# Patient Record
Sex: FEMALE | Race: WHITE | Employment: OTHER | URBAN - METROPOLITAN AREA
[De-identification: names, ages, dates, MRNs, and addresses within clinical notes are randomized per-mention and may not be internally consistent; named-entity substitution may affect disease eponyms.]

---

## 2017-05-18 ENCOUNTER — OFFICE VISIT (OUTPATIENT)
Dept: ORTHOPEDIC SURGERY | Age: 42
End: 2017-05-18

## 2017-05-18 VITALS
HEIGHT: 60 IN | DIASTOLIC BLOOD PRESSURE: 84 MMHG | BODY MASS INDEX: 29.26 KG/M2 | WEIGHT: 149.03 LBS | SYSTOLIC BLOOD PRESSURE: 134 MMHG | HEART RATE: 84 BPM

## 2017-05-18 DIAGNOSIS — M79.671 FOOT PAIN, RIGHT: Primary | ICD-10-CM

## 2017-05-18 DIAGNOSIS — S92.341A CLOSED DISPLACED FRACTURE OF FOURTH METATARSAL BONE OF RIGHT FOOT, INITIAL ENCOUNTER: ICD-10-CM

## 2017-05-18 PROCEDURE — G8419 CALC BMI OUT NRM PARAM NOF/U: HCPCS | Performed by: ORTHOPAEDIC SURGERY

## 2017-05-18 PROCEDURE — 99214 OFFICE O/P EST MOD 30 MIN: CPT | Performed by: ORTHOPAEDIC SURGERY

## 2017-05-18 PROCEDURE — G8427 DOCREV CUR MEDS BY ELIG CLIN: HCPCS | Performed by: ORTHOPAEDIC SURGERY

## 2017-05-18 PROCEDURE — 73620 X-RAY EXAM OF FOOT: CPT | Performed by: ORTHOPAEDIC SURGERY

## 2017-05-18 PROCEDURE — 73610 X-RAY EXAM OF ANKLE: CPT | Performed by: ORTHOPAEDIC SURGERY

## 2017-05-18 PROCEDURE — 1036F TOBACCO NON-USER: CPT | Performed by: ORTHOPAEDIC SURGERY

## 2017-05-18 PROCEDURE — L3260 AMBULATORY SURGICAL BOOT EAC: HCPCS | Performed by: ORTHOPAEDIC SURGERY

## 2017-05-18 PROCEDURE — 28470 CLTX METATARSAL FX WO MNP EA: CPT | Performed by: ORTHOPAEDIC SURGERY

## 2017-06-07 ENCOUNTER — OFFICE VISIT (OUTPATIENT)
Dept: ORTHOPEDIC SURGERY | Age: 42
End: 2017-06-07

## 2017-06-07 VITALS
SYSTOLIC BLOOD PRESSURE: 105 MMHG | HEART RATE: 87 BPM | DIASTOLIC BLOOD PRESSURE: 74 MMHG | HEIGHT: 60 IN | WEIGHT: 149.03 LBS | BODY MASS INDEX: 29.26 KG/M2

## 2017-06-07 DIAGNOSIS — M25.571 RIGHT ANKLE PAIN, UNSPECIFIED CHRONICITY: Primary | ICD-10-CM

## 2017-06-07 DIAGNOSIS — S92.341D CLOSED DISPLACED FRACTURE OF FOURTH METATARSAL BONE OF RIGHT FOOT WITH ROUTINE HEALING, SUBSEQUENT ENCOUNTER: ICD-10-CM

## 2017-06-07 PROCEDURE — 73630 X-RAY EXAM OF FOOT: CPT | Performed by: ORTHOPAEDIC SURGERY

## 2017-06-07 PROCEDURE — 29405 APPL SHORT LEG CAST: CPT | Performed by: ORTHOPAEDIC SURGERY

## 2017-06-07 PROCEDURE — 99024 POSTOP FOLLOW-UP VISIT: CPT | Performed by: ORTHOPAEDIC SURGERY

## 2017-07-05 ENCOUNTER — OFFICE VISIT (OUTPATIENT)
Dept: ORTHOPEDIC SURGERY | Age: 42
End: 2017-07-05

## 2017-07-05 VITALS
WEIGHT: 149.03 LBS | HEART RATE: 99 BPM | BODY MASS INDEX: 29.26 KG/M2 | SYSTOLIC BLOOD PRESSURE: 119 MMHG | DIASTOLIC BLOOD PRESSURE: 78 MMHG | HEIGHT: 60 IN

## 2017-07-05 DIAGNOSIS — S92.341D CLOSED DISPLACED FRACTURE OF FOURTH METATARSAL BONE OF RIGHT FOOT WITH ROUTINE HEALING, SUBSEQUENT ENCOUNTER: ICD-10-CM

## 2017-07-05 DIAGNOSIS — M79.671 FOOT PAIN, RIGHT: Primary | ICD-10-CM

## 2017-07-05 PROCEDURE — L4361 PNEUMA/VAC WALK BOOT PRE OTS: HCPCS | Performed by: ORTHOPAEDIC SURGERY

## 2017-07-05 PROCEDURE — 99024 POSTOP FOLLOW-UP VISIT: CPT | Performed by: ORTHOPAEDIC SURGERY

## 2017-07-05 PROCEDURE — 73630 X-RAY EXAM OF FOOT: CPT | Performed by: ORTHOPAEDIC SURGERY

## 2017-07-14 ENCOUNTER — OFFICE VISIT (OUTPATIENT)
Dept: ORTHOPEDIC SURGERY | Age: 42
End: 2017-07-14

## 2017-07-14 VITALS
WEIGHT: 149.03 LBS | BODY MASS INDEX: 29.26 KG/M2 | HEART RATE: 75 BPM | HEIGHT: 60 IN | DIASTOLIC BLOOD PRESSURE: 72 MMHG | SYSTOLIC BLOOD PRESSURE: 127 MMHG

## 2017-07-14 DIAGNOSIS — S92.341D CLOSED DISPLACED FRACTURE OF FOURTH METATARSAL BONE OF RIGHT FOOT WITH ROUTINE HEALING, SUBSEQUENT ENCOUNTER: Primary | ICD-10-CM

## 2017-07-14 PROCEDURE — 99024 POSTOP FOLLOW-UP VISIT: CPT | Performed by: ORTHOPAEDIC SURGERY

## 2017-07-14 PROCEDURE — 73630 X-RAY EXAM OF FOOT: CPT | Performed by: ORTHOPAEDIC SURGERY

## 2017-08-02 ENCOUNTER — OFFICE VISIT (OUTPATIENT)
Dept: ORTHOPEDIC SURGERY | Age: 42
End: 2017-08-02

## 2017-08-02 VITALS
HEIGHT: 60 IN | SYSTOLIC BLOOD PRESSURE: 115 MMHG | BODY MASS INDEX: 29.26 KG/M2 | HEART RATE: 104 BPM | WEIGHT: 149.03 LBS | DIASTOLIC BLOOD PRESSURE: 68 MMHG

## 2017-08-02 DIAGNOSIS — S92.341D CLOSED DISPLACED FRACTURE OF FOURTH METATARSAL BONE OF RIGHT FOOT WITH ROUTINE HEALING, SUBSEQUENT ENCOUNTER: ICD-10-CM

## 2017-08-02 DIAGNOSIS — M79.671 FOOT PAIN, RIGHT: Primary | ICD-10-CM

## 2017-08-02 PROCEDURE — 73630 X-RAY EXAM OF FOOT: CPT | Performed by: ORTHOPAEDIC SURGERY

## 2017-08-02 PROCEDURE — 99024 POSTOP FOLLOW-UP VISIT: CPT | Performed by: ORTHOPAEDIC SURGERY

## 2017-09-13 ENCOUNTER — OFFICE VISIT (OUTPATIENT)
Dept: ORTHOPEDIC SURGERY | Age: 42
End: 2017-09-13

## 2017-09-13 VITALS
BODY MASS INDEX: 29.26 KG/M2 | HEART RATE: 98 BPM | DIASTOLIC BLOOD PRESSURE: 69 MMHG | SYSTOLIC BLOOD PRESSURE: 108 MMHG | WEIGHT: 149.03 LBS | HEIGHT: 60 IN

## 2017-09-13 DIAGNOSIS — S92.341D CLOSED DISPLACED FRACTURE OF FOURTH METATARSAL BONE OF RIGHT FOOT WITH ROUTINE HEALING, SUBSEQUENT ENCOUNTER: ICD-10-CM

## 2017-09-13 DIAGNOSIS — M79.671 FOOT PAIN, RIGHT: Primary | ICD-10-CM

## 2017-09-13 PROCEDURE — G8427 DOCREV CUR MEDS BY ELIG CLIN: HCPCS | Performed by: ORTHOPAEDIC SURGERY

## 2017-09-13 PROCEDURE — 99212 OFFICE O/P EST SF 10 MIN: CPT | Performed by: ORTHOPAEDIC SURGERY

## 2017-09-13 PROCEDURE — 73630 X-RAY EXAM OF FOOT: CPT | Performed by: ORTHOPAEDIC SURGERY

## 2017-09-13 PROCEDURE — 1036F TOBACCO NON-USER: CPT | Performed by: ORTHOPAEDIC SURGERY

## 2017-09-13 PROCEDURE — G8417 CALC BMI ABV UP PARAM F/U: HCPCS | Performed by: ORTHOPAEDIC SURGERY

## 2018-03-27 ENCOUNTER — HOSPITAL ENCOUNTER (OUTPATIENT)
Dept: MAMMOGRAPHY | Age: 43
Discharge: HOME | End: 2018-03-27
Attending: NURSE PRACTITIONER | Admitting: NURSE PRACTITIONER

## 2018-03-27 DIAGNOSIS — Z12.31 ENCOUNTER FOR SCREENING MAMMOGRAM FOR BREAST CANCER: ICD-10-CM

## 2019-03-28 ENCOUNTER — HOSPITAL ENCOUNTER (OUTPATIENT)
Dept: MAMMOGRAPHY | Age: 44
Discharge: HOME | End: 2019-03-28
Payer: MEDICARE

## 2019-03-28 DIAGNOSIS — Z12.31 SCREENING MAMMOGRAM, ENCOUNTER FOR: ICD-10-CM

## 2019-03-28 PROCEDURE — 77067 SCR MAMMO BI INCL CAD: CPT

## 2020-03-10 ENCOUNTER — HOSPITAL ENCOUNTER (EMERGENCY)
Age: 45
Discharge: HOME | End: 2020-03-10
Attending: EMERGENCY MEDICINE
Payer: MEDICARE

## 2020-03-10 ENCOUNTER — APPOINTMENT (OUTPATIENT)
Dept: GENERAL RADIOLOGY | Age: 45
End: 2020-03-10
Payer: MEDICARE

## 2020-03-10 VITALS
SYSTOLIC BLOOD PRESSURE: 110 MMHG | RESPIRATION RATE: 16 BRPM | OXYGEN SATURATION: 100 % | HEART RATE: 85 BPM | TEMPERATURE: 98.5 F | DIASTOLIC BLOOD PRESSURE: 85 MMHG | BODY MASS INDEX: 28.66 KG/M2 | WEIGHT: 146 LBS

## 2020-03-10 LAB
RAPID INFLUENZA  B AGN: NEGATIVE
RAPID INFLUENZA A AGN: POSITIVE
S PYO AG THROAT QL: NEGATIVE

## 2020-03-10 PROCEDURE — 87880 STREP A ASSAY W/OPTIC: CPT

## 2020-03-10 PROCEDURE — 87804 INFLUENZA ASSAY W/OPTIC: CPT

## 2020-03-10 PROCEDURE — 71046 X-RAY EXAM CHEST 2 VIEWS: CPT

## 2020-03-10 PROCEDURE — 99283 EMERGENCY DEPT VISIT LOW MDM: CPT

## 2020-03-10 PROCEDURE — 87081 CULTURE SCREEN ONLY: CPT

## 2020-03-10 RX ORDER — ONDANSETRON 4 MG/1
4 TABLET, FILM COATED ORAL EVERY 8 HOURS PRN
Qty: 10 TABLET | Refills: 0 | Status: SHIPPED | OUTPATIENT
Start: 2020-03-10 | End: 2021-01-03 | Stop reason: ALTCHOICE

## 2020-03-10 RX ORDER — AZITHROMYCIN 1 G
1 PACKET (EA) ORAL ONCE
COMMUNITY
End: 2021-01-03 | Stop reason: ALTCHOICE

## 2020-03-10 RX ORDER — AMOXICILLIN AND CLAVULANATE POTASSIUM 500; 125 MG/1; MG/1
1 TABLET, FILM COATED ORAL 2 TIMES DAILY
Qty: 20 TABLET | Refills: 0 | Status: SHIPPED | OUTPATIENT
Start: 2020-03-10 | End: 2020-03-20

## 2020-03-10 RX ORDER — AMOXICILLIN AND CLAVULANATE POTASSIUM 875; 125 MG/1; MG/1
1 TABLET, FILM COATED ORAL 2 TIMES DAILY
Qty: 20 TABLET | Refills: 0 | Status: SHIPPED | OUTPATIENT
Start: 2020-03-10 | End: 2020-03-10

## 2020-03-10 ASSESSMENT — ENCOUNTER SYMPTOMS
RHINORRHEA: 1
COUGH: 1
SORE THROAT: 1
SINUS CONGESTION: 1
VOMITING: 1
ABDOMINAL PAIN: 0

## 2020-03-10 ASSESSMENT — PAIN DESCRIPTION - DESCRIPTORS: DESCRIPTORS: ACHING

## 2020-03-10 ASSESSMENT — PAIN DESCRIPTION - LOCATION: LOCATION: THROAT

## 2020-03-10 ASSESSMENT — PAIN SCALES - WONG BAKER: WONGBAKER_NUMERICALRESPONSE: 6

## 2020-03-10 ASSESSMENT — PAIN DESCRIPTION - PAIN TYPE: TYPE: ACUTE PAIN

## 2020-03-10 NOTE — ED PROVIDER NOTES
[address] [facility]        Pt Name: Krysten Negron  MRN: [id number]  [place] 1975  Date of evaluation: 3/10/2020  Provider: Edi Hines PA-C  PCP: Felipe De Souza MD    Shared Visit or Autonomous Visit:  I have seen and evaluated this patient with my supervising physician Cynthia Humphries MD.    [address] [facility]       Chief Complaint   Patient presents with    Cough     pt states she has had cough x2 weeks, states she has had Z-pack x2 with worsening cough, family states they are concerned she may have pneumonnia, states today she began having sore throat and nausea       HISTORY OF PRESENT ILLNESS   (Location/Symptom, Timing/Onset, Context/Setting, Quality, Duration, Modifying Factors, Severity)  Note limiting factors. Krysten Negron is a 45 y.o. female brought in by parents for evaluation of cough and chest congestion for the past 1 month. States several family members have been sick with cough and congestion. Patient's father is a  he started her on a Z-Vinnie approximately 1 month ago after symptoms started and she initially improved but symptoms returned 2 weeks ago and was not getting any better so he started her on another Z-Vinnie which she finished today but when she was at workshop vomited x2. She has Down syndrome but otherwise states she is very healthy not on any chronic medications. Family doctor is Dr Twyla Padilla. She denies any abdominal pain. No diarrhea. They have not noticed any fevers. She complained of sore throat today. The history is provided by the patient and a parent. Cough   Progression:  Unchanged  Chronicity:  New  Associated symptoms: rhinorrhea, sinus congestion and sore throat    Associated symptoms: no chest pain and no fever        Nursing Notes were reviewed    REVIEW OF SYSTEMS    (2-9 systems for level 4, 10 or more for level 5)     Review of Systems   Constitutional: Negative for fever.    HENT: Positive for congestion, postnasal drip, rhinorrhea and sore throat. Respiratory: Positive for cough. Cardiovascular: Negative for chest pain. Gastrointestinal: Positive for vomiting. Negative for abdominal pain. Positives and Pertinent negatives as per HPI. PAST MEDICAL HISTORY     Past Medical History:   Diagnosis Date    Down syndrome          SURGICAL HISTORY     Past Surgical History:   Procedure Laterality Date    FOOT SURGERY  5/26/11    lapidus procedure and soft tissue release right foot. JoseflashRosmery Austin       Discharge Medication List as of 3/10/2020  4:35 PM      CONTINUE these medications which have NOT CHANGED    Details   azithromycin (ZITHROMAX) 1 g powder Take 1 packet by mouth onceHistorical Med      !! diclofenac sodium (VOLTAREN) 1 % GEL Apply 2 g topically 2 times daily, Topical, 2 TIMES DAILY Starting 9/13/2017, Until Discontinued, Disp-1 Tube, R-3, Normal      !! diclofenac sodium (VOLTAREN) 1 % GEL Apply 4 g topically 4 times daily, Topical, 4 TIMES DAILY Starting 5/13/2016, Until Discontinued, Disp-5 Tube, R-0, Normal      MedroxyPROGESTERone Acetate (DEPO-PROVERA IM) Inject  into the muscle See Admin Instructions. Every three months       Calcium Citrate-Vitamin D 200-200 MG-UNIT TABS Take  by mouth daily. !! - Potential duplicate medications found. Please discuss with provider. ALLERGIES     Patient has no known allergies.     FAMILYHISTORY       Family History   Problem Relation Age of Onset    Arthritis Mother     Cancer Mother     Diabetes Mother     High Blood Pressure Mother     High Cholesterol Mother     Arthritis Father     Hearing Loss Father     Heart Disease Father     High Blood Pressure Father     High Cholesterol Father     Stroke Father     Arthritis Sister     Cancer Sister     Asthma Maternal Grandmother     Early Death Maternal Grandfather           SOCIAL HISTORY       Social History     Socioeconomic exudate or uvula swelling. Eyes:      Conjunctiva/sclera: Conjunctivae normal.   Cardiovascular:      Rate and Rhythm: Normal rate and regular rhythm. Heart sounds: Normal heart sounds. Pulmonary:      Effort: Pulmonary effort is normal. No respiratory distress. Breath sounds: Normal breath sounds. No stridor. No wheezing, rhonchi or rales. Abdominal:      General: Bowel sounds are normal. There is no distension. Tenderness: There is no abdominal tenderness. There is no guarding or rebound. Skin:     General: Skin is warm and dry. Neurological:      Mental Status: She is alert and oriented to person, place, and time. Motor: No abnormal muscle tone. Psychiatric:         Behavior: Behavior normal.         DIAGNOSTIC RESULTS   LABS:    Labs Reviewed   RAPID INFLUENZA A/B ANTIGENS - Abnormal; Notable for the following components:       Result Value    Rapid Influenza A Ag POSITIVE (*)     All other components within normal limits    Narrative:     Performed at:  [facility]. [facility]  [address] [place] [address]. [facility], [address]   Phone  A THROAT    Narrative:     Performed at:  [facility]  [address] [place] [address]. [facility], [address]   Phone [phone]   CULTURE, BETA STREP CONFIRM PLATES     Results for orders placed or performed during the hospital encounter of 03/10/20   Strep screen group a throat   Result Value Ref Range    Rapid Strep A Screen Negative Negative   Rapid influenza A/B antigens   Result Value Ref Range    Rapid Influenza A Ag POSITIVE (A) Negative    Rapid Influenza B Ag Negative Negative       All other labs were within normal range or not returned as of this dictation. EKG: All EKG's are interpreted by the Emergency Department Physician in the absence of a cardiologist.  Please see their note for interpretation of EKG.       RADIOLOGY:   Non-plain film

## 2020-03-13 LAB — S PYO THROAT QL CULT: NORMAL

## 2020-12-28 LAB — SARS-COV-2: ABNORMAL

## 2021-01-03 ENCOUNTER — HOSPITAL ENCOUNTER (INPATIENT)
Age: 46
LOS: 5 days | Discharge: HOME HEALTH | DRG: 177 | End: 2021-01-08
Attending: EMERGENCY MEDICINE | Admitting: INTERNAL MEDICINE
Payer: MEDICARE

## 2021-01-03 ENCOUNTER — APPOINTMENT (OUTPATIENT)
Dept: GENERAL RADIOLOGY | Age: 46
DRG: 177 | End: 2021-01-03
Payer: MEDICARE

## 2021-01-03 ENCOUNTER — APPOINTMENT (OUTPATIENT)
Dept: CT IMAGING | Age: 46
DRG: 177 | End: 2021-01-03
Payer: MEDICARE

## 2021-01-03 DIAGNOSIS — R09.02 HYPOXIA: ICD-10-CM

## 2021-01-03 DIAGNOSIS — J12.82 PNEUMONIA DUE TO COVID-19 VIRUS: Primary | ICD-10-CM

## 2021-01-03 DIAGNOSIS — R53.1 GENERALIZED WEAKNESS: ICD-10-CM

## 2021-01-03 DIAGNOSIS — U07.1 PNEUMONIA DUE TO COVID-19 VIRUS: Primary | ICD-10-CM

## 2021-01-03 DIAGNOSIS — R63.8 DECREASED ORAL INTAKE: ICD-10-CM

## 2021-01-03 PROBLEM — E87.1 HYPONATREMIA: Status: ACTIVE | Noted: 2021-01-03

## 2021-01-03 PROBLEM — Q90.9 DOWN'S SYNDROME: Status: ACTIVE | Noted: 2021-01-03

## 2021-01-03 PROBLEM — R91.8 PULMONARY INFILTRATES: Status: ACTIVE | Noted: 2021-01-03

## 2021-01-03 LAB
A/G RATIO: 1 (ref 1.1–2.2)
ALBUMIN SERPL-MCNC: 3.7 G/DL (ref 3.4–5)
ALP BLD-CCNC: 85 U/L (ref 40–129)
ALT SERPL-CCNC: 33 U/L (ref 10–40)
ANION GAP SERPL CALCULATED.3IONS-SCNC: 9 MMOL/L (ref 3–16)
APTT: 22.5 SEC (ref 24.2–36.2)
AST SERPL-CCNC: 39 U/L (ref 15–37)
BASOPHILS ABSOLUTE: 0 K/UL (ref 0–0.2)
BASOPHILS RELATIVE PERCENT: 0.3 %
BILIRUB SERPL-MCNC: 0.4 MG/DL (ref 0–1)
BUN BLDV-MCNC: 15 MG/DL (ref 7–20)
CALCIUM SERPL-MCNC: 9 MG/DL (ref 8.3–10.6)
CHLORIDE BLD-SCNC: 99 MMOL/L (ref 99–110)
CO2: 24 MMOL/L (ref 21–32)
CREAT SERPL-MCNC: 0.7 MG/DL (ref 0.6–1.1)
D DIMER: >5250 NG/ML DDU (ref 0–229)
EOSINOPHILS ABSOLUTE: 0 K/UL (ref 0–0.6)
EOSINOPHILS RELATIVE PERCENT: 0.1 %
GFR AFRICAN AMERICAN: >60
GFR NON-AFRICAN AMERICAN: >60
GLOBULIN: 3.8 G/DL
GLUCOSE BLD-MCNC: 106 MG/DL (ref 70–99)
HCG QUALITATIVE: NEGATIVE
HCT VFR BLD CALC: 37.2 % (ref 36–48)
HCT VFR BLD CALC: 41.2 % (ref 36–48)
HEMOGLOBIN: 12.3 G/DL (ref 12–16)
HEMOGLOBIN: 13.6 G/DL (ref 12–16)
LACTIC ACID: 1.1 MMOL/L (ref 0.4–2)
LYMPHOCYTES ABSOLUTE: 0.8 K/UL (ref 1–5.1)
LYMPHOCYTES RELATIVE PERCENT: 14.5 %
MCH RBC QN AUTO: 31.5 PG (ref 26–34)
MCH RBC QN AUTO: 31.6 PG (ref 26–34)
MCHC RBC AUTO-ENTMCNC: 33 G/DL (ref 31–36)
MCHC RBC AUTO-ENTMCNC: 33.1 G/DL (ref 31–36)
MCV RBC AUTO: 95.4 FL (ref 80–100)
MCV RBC AUTO: 95.5 FL (ref 80–100)
MONOCYTES ABSOLUTE: 0.7 K/UL (ref 0–1.3)
MONOCYTES RELATIVE PERCENT: 13.9 %
NEUTROPHILS ABSOLUTE: 3.7 K/UL (ref 1.7–7.7)
NEUTROPHILS RELATIVE PERCENT: 71.2 %
PDW BLD-RTO: 13.5 % (ref 12.4–15.4)
PDW BLD-RTO: 13.6 % (ref 12.4–15.4)
PLATELET # BLD: 140 K/UL (ref 135–450)
PLATELET # BLD: 141 K/UL (ref 135–450)
PMV BLD AUTO: 8.5 FL (ref 5–10.5)
PMV BLD AUTO: 8.7 FL (ref 5–10.5)
POTASSIUM REFLEX MAGNESIUM: 5 MMOL/L (ref 3.5–5.1)
PRO-BNP: ABNORMAL PG/ML (ref 0–124)
PROCALCITONIN: 0.07 NG/ML (ref 0–0.15)
PROCALCITONIN: 0.07 NG/ML (ref 0–0.15)
RBC # BLD: 3.89 M/UL (ref 4–5.2)
RBC # BLD: 4.32 M/UL (ref 4–5.2)
SODIUM BLD-SCNC: 132 MMOL/L (ref 136–145)
TOTAL PROTEIN: 7.5 G/DL (ref 6.4–8.2)
TROPONIN: <0.01 NG/ML
WBC # BLD: 3.3 K/UL (ref 4–11)
WBC # BLD: 5.2 K/UL (ref 4–11)

## 2021-01-03 PROCEDURE — XW033E5 INTRODUCTION OF REMDESIVIR ANTI-INFECTIVE INTO PERIPHERAL VEIN, PERCUTANEOUS APPROACH, NEW TECHNOLOGY GROUP 5: ICD-10-PCS | Performed by: INTERNAL MEDICINE

## 2021-01-03 PROCEDURE — 84703 CHORIONIC GONADOTROPIN ASSAY: CPT

## 2021-01-03 PROCEDURE — 93005 ELECTROCARDIOGRAM TRACING: CPT | Performed by: PHYSICIAN ASSISTANT

## 2021-01-03 PROCEDURE — 84145 PROCALCITONIN (PCT): CPT

## 2021-01-03 PROCEDURE — 85730 THROMBOPLASTIN TIME PARTIAL: CPT

## 2021-01-03 PROCEDURE — 2580000003 HC RX 258: Performed by: INTERNAL MEDICINE

## 2021-01-03 PROCEDURE — 1200000000 HC SEMI PRIVATE

## 2021-01-03 PROCEDURE — 99222 1ST HOSP IP/OBS MODERATE 55: CPT | Performed by: INTERNAL MEDICINE

## 2021-01-03 PROCEDURE — 82728 ASSAY OF FERRITIN: CPT

## 2021-01-03 PROCEDURE — 36415 COLL VENOUS BLD VENIPUNCTURE: CPT

## 2021-01-03 PROCEDURE — 99284 EMERGENCY DEPT VISIT MOD MDM: CPT

## 2021-01-03 PROCEDURE — 71045 X-RAY EXAM CHEST 1 VIEW: CPT

## 2021-01-03 PROCEDURE — 84443 ASSAY THYROID STIM HORMONE: CPT

## 2021-01-03 PROCEDURE — 6360000004 HC RX CONTRAST MEDICATION

## 2021-01-03 PROCEDURE — 94761 N-INVAS EAR/PLS OXIMETRY MLT: CPT

## 2021-01-03 PROCEDURE — 6360000002 HC RX W HCPCS: Performed by: INTERNAL MEDICINE

## 2021-01-03 PROCEDURE — 82306 VITAMIN D 25 HYDROXY: CPT

## 2021-01-03 PROCEDURE — 71260 CT THORAX DX C+: CPT

## 2021-01-03 PROCEDURE — 2500000003 HC RX 250 WO HCPCS: Performed by: INTERNAL MEDICINE

## 2021-01-03 PROCEDURE — 6370000000 HC RX 637 (ALT 250 FOR IP): Performed by: PHYSICIAN ASSISTANT

## 2021-01-03 PROCEDURE — 6360000002 HC RX W HCPCS: Performed by: NURSE PRACTITIONER

## 2021-01-03 PROCEDURE — 84484 ASSAY OF TROPONIN QUANT: CPT

## 2021-01-03 PROCEDURE — 2500000003 HC RX 250 WO HCPCS: Performed by: NURSE PRACTITIONER

## 2021-01-03 PROCEDURE — 83880 ASSAY OF NATRIURETIC PEPTIDE: CPT

## 2021-01-03 PROCEDURE — 2700000000 HC OXYGEN THERAPY PER DAY

## 2021-01-03 PROCEDURE — 2580000003 HC RX 258: Performed by: PHYSICIAN ASSISTANT

## 2021-01-03 PROCEDURE — XW13325 TRANSFUSION OF CONVALESCENT PLASMA (NONAUTOLOGOUS) INTO PERIPHERAL VEIN, PERCUTANEOUS APPROACH, NEW TECHNOLOGY GROUP 5: ICD-10-PCS | Performed by: INTERNAL MEDICINE

## 2021-01-03 PROCEDURE — 80053 COMPREHEN METABOLIC PANEL: CPT

## 2021-01-03 PROCEDURE — 85027 COMPLETE CBC AUTOMATED: CPT

## 2021-01-03 PROCEDURE — 6360000002 HC RX W HCPCS: Performed by: PHYSICIAN ASSISTANT

## 2021-01-03 PROCEDURE — 85379 FIBRIN DEGRADATION QUANT: CPT

## 2021-01-03 PROCEDURE — 96361 HYDRATE IV INFUSION ADD-ON: CPT

## 2021-01-03 PROCEDURE — 85025 COMPLETE CBC W/AUTO DIFF WBC: CPT

## 2021-01-03 PROCEDURE — 96375 TX/PRO/DX INJ NEW DRUG ADDON: CPT

## 2021-01-03 PROCEDURE — 83605 ASSAY OF LACTIC ACID: CPT

## 2021-01-03 RX ORDER — VITAMIN B COMPLEX
2000 TABLET ORAL DAILY
Status: DISCONTINUED | OUTPATIENT
Start: 2021-01-03 | End: 2021-01-08 | Stop reason: HOSPADM

## 2021-01-03 RX ORDER — METHYLPREDNISOLONE SODIUM SUCCINATE 40 MG/ML
40 INJECTION, POWDER, LYOPHILIZED, FOR SOLUTION INTRAMUSCULAR; INTRAVENOUS EVERY 12 HOURS
Status: DISCONTINUED | OUTPATIENT
Start: 2021-01-03 | End: 2021-01-08 | Stop reason: HOSPADM

## 2021-01-03 RX ORDER — ACETAMINOPHEN 650 MG/1
650 SUPPOSITORY RECTAL EVERY 6 HOURS PRN
Status: DISCONTINUED | OUTPATIENT
Start: 2021-01-03 | End: 2021-01-08 | Stop reason: HOSPADM

## 2021-01-03 RX ORDER — HEPARIN SODIUM 1000 [USP'U]/ML
2200 INJECTION, SOLUTION INTRAVENOUS; SUBCUTANEOUS PRN
Status: DISCONTINUED | OUTPATIENT
Start: 2021-01-03 | End: 2021-01-08 | Stop reason: HOSPADM

## 2021-01-03 RX ORDER — SODIUM CHLORIDE 9 MG/ML
INJECTION, SOLUTION INTRAVENOUS PRN
Status: COMPLETED | OUTPATIENT
Start: 2021-01-03 | End: 2021-01-04

## 2021-01-03 RX ORDER — POLYETHYLENE GLYCOL 3350 17 G/17G
17 POWDER, FOR SOLUTION ORAL DAILY PRN
Status: DISCONTINUED | OUTPATIENT
Start: 2021-01-03 | End: 2021-01-08 | Stop reason: HOSPADM

## 2021-01-03 RX ORDER — SODIUM CHLORIDE 0.9 % (FLUSH) 0.9 %
10 SYRINGE (ML) INJECTION PRN
Status: DISCONTINUED | OUTPATIENT
Start: 2021-01-03 | End: 2021-01-08 | Stop reason: HOSPADM

## 2021-01-03 RX ORDER — SODIUM CHLORIDE 0.9 % (FLUSH) 0.9 %
10 SYRINGE (ML) INJECTION EVERY 12 HOURS SCHEDULED
Status: DISCONTINUED | OUTPATIENT
Start: 2021-01-03 | End: 2021-01-08 | Stop reason: HOSPADM

## 2021-01-03 RX ORDER — HEPARIN SODIUM 10000 [USP'U]/100ML
8.1 INJECTION, SOLUTION INTRAVENOUS CONTINUOUS
Status: DISCONTINUED | OUTPATIENT
Start: 2021-01-03 | End: 2021-01-08

## 2021-01-03 RX ORDER — HEPARIN SODIUM 1000 [USP'U]/ML
4300 INJECTION, SOLUTION INTRAVENOUS; SUBCUTANEOUS ONCE
Status: COMPLETED | OUTPATIENT
Start: 2021-01-03 | End: 2021-01-03

## 2021-01-03 RX ORDER — HEPARIN SODIUM 1000 [USP'U]/ML
4300 INJECTION, SOLUTION INTRAVENOUS; SUBCUTANEOUS PRN
Status: DISCONTINUED | OUTPATIENT
Start: 2021-01-03 | End: 2021-01-08 | Stop reason: HOSPADM

## 2021-01-03 RX ORDER — ACETAMINOPHEN 325 MG/1
650 TABLET ORAL EVERY 6 HOURS PRN
Status: DISCONTINUED | OUTPATIENT
Start: 2021-01-03 | End: 2021-01-08 | Stop reason: HOSPADM

## 2021-01-03 RX ORDER — 0.9 % SODIUM CHLORIDE 0.9 %
30 INTRAVENOUS SOLUTION INTRAVENOUS PRN
Status: DISCONTINUED | OUTPATIENT
Start: 2021-01-03 | End: 2021-01-08 | Stop reason: HOSPADM

## 2021-01-03 RX ORDER — PROMETHAZINE HYDROCHLORIDE 25 MG/1
12.5 TABLET ORAL EVERY 6 HOURS PRN
Status: DISCONTINUED | OUTPATIENT
Start: 2021-01-03 | End: 2021-01-08 | Stop reason: HOSPADM

## 2021-01-03 RX ORDER — ONDANSETRON 2 MG/ML
4 INJECTION INTRAMUSCULAR; INTRAVENOUS EVERY 6 HOURS PRN
Status: DISCONTINUED | OUTPATIENT
Start: 2021-01-03 | End: 2021-01-08 | Stop reason: HOSPADM

## 2021-01-03 RX ORDER — 0.9 % SODIUM CHLORIDE 0.9 %
1000 INTRAVENOUS SOLUTION INTRAVENOUS ONCE
Status: COMPLETED | OUTPATIENT
Start: 2021-01-03 | End: 2021-01-03

## 2021-01-03 RX ORDER — DEXAMETHASONE SODIUM PHOSPHATE 10 MG/ML
6 INJECTION INTRAMUSCULAR; INTRAVENOUS ONCE
Status: COMPLETED | OUTPATIENT
Start: 2021-01-03 | End: 2021-01-03

## 2021-01-03 RX ORDER — ACETAMINOPHEN 325 MG/1
650 TABLET ORAL ONCE
Status: COMPLETED | OUTPATIENT
Start: 2021-01-03 | End: 2021-01-03

## 2021-01-03 RX ADMIN — IOPAMIDOL 75 ML: 755 INJECTION, SOLUTION INTRAVENOUS at 22:03

## 2021-01-03 RX ADMIN — REMDESIVIR 200 MG: 100 INJECTION, POWDER, LYOPHILIZED, FOR SOLUTION INTRAVENOUS at 17:27

## 2021-01-03 RX ADMIN — Medication 10 ML: at 21:25

## 2021-01-03 RX ADMIN — SODIUM CHLORIDE 1000 ML: 9 INJECTION, SOLUTION INTRAVENOUS at 10:50

## 2021-01-03 RX ADMIN — HEPARIN SODIUM 9.7 ML/HR: 10000 INJECTION, SOLUTION INTRAVENOUS at 23:31

## 2021-01-03 RX ADMIN — HEPARIN SODIUM 4300 UNITS: 1000 INJECTION INTRAVENOUS; SUBCUTANEOUS at 23:31

## 2021-01-03 RX ADMIN — METHYLPREDNISOLONE SODIUM SUCCINATE 40 MG: 40 INJECTION, POWDER, FOR SOLUTION INTRAMUSCULAR; INTRAVENOUS at 14:41

## 2021-01-03 RX ADMIN — ACETAMINOPHEN 650 MG: 325 TABLET ORAL at 10:44

## 2021-01-03 RX ADMIN — DEXAMETHASONE SODIUM PHOSPHATE 6 MG: 10 INJECTION INTRAMUSCULAR; INTRAVENOUS at 10:50

## 2021-01-03 ASSESSMENT — ENCOUNTER SYMPTOMS
EYES NEGATIVE: 1
COUGH: 1
COLOR CHANGE: 0
NAUSEA: 0
ABDOMINAL PAIN: 0
DIARRHEA: 0
VOMITING: 0
SHORTNESS OF BREATH: 1

## 2021-01-03 ASSESSMENT — PAIN DESCRIPTION - LOCATION: LOCATION: THROAT

## 2021-01-03 NOTE — ED NOTES
1158 - PS to hospitalists  Re: Covid positive, hypoxia  1230 - Dr Regulo Olson called back to speak with HANNA Rivas  01/03/21 123

## 2021-01-03 NOTE — ED PROVIDER NOTES
I independently examined and evaluated Jon Witt. In brief, patient is a 44-year-old female with history of Down syndrome who was recently diagnosed with Covid 19 who presents to the emergency department for evaluation of shortness of breath. Focused exam revealed ill-appearing female on nasal cannula O2 to maintain sat above 90. She had increased work of breathing. Chest x-ray shows bilateral pneumonia. Lactate 1.1. creatinine 0.7. Troponin less than 0.01. As patient has COVID-19 requiring oxygen, hospitalist consulted for mission. Admit. The Ekg interpreted by me shows  Normal sinus rhythm with a rate of 91  Axis is normal  QTc is Normal   Intervals and Durations are unremarkable. ST Segments: nonspecific st changes    All diagnostic, treatment, and disposition decisions were made by myself in conjunction with the advanced practice provider. For all further details of the patient's emergency department visit, please see the advanced practice provider's documentation. Comment: Please note this report has been produced using speech recognition software and may contain errors related to that system including errors in grammar, punctuation, and spelling, as well as words and phrases that may be inappropriate. If there are any questions or concerns please feel free to contact the dictating provider for clarification.        Chi Somers MD  01/03/21 1958

## 2021-01-03 NOTE — ED PROVIDER NOTES
[address] [facility]  ED  EMERGENCY DEPARTMENT ENCOUNTER        Pt Name: Alli Sellers  MRN: [id number]  [place] 1975  Date of evaluation: 1/3/2021  Provider: Patrick Beverly PA-C  PCP: Nilton Childs MD  ED Attending: Rosa Corral MD      This patient was seen by the attending provider     History provided by the patient and her sister    CHIEF COMPLAINT:     Chief Complaint   Patient presents with    Fever     tested positive for covid Monday    Cough    Fatigue    Shortness of Breath       HISTORY OF PRESENT ILLNESS:      Alli Sellers is a 39 y.o. female who arrives to the ED by private vehicle with family. Her sister who lives next door to her is accompanying her due to the patient's history of Down syndrome the patient lives with her father and mother. Father tested positive for Covid and is recovering. The patient tested positive. For Covid this past Monday, 12/28. The patient's sister reports that all week she has been running fevers. She had decreased activity and appetite. She has been generally weak, coughing and recently short of breath. Due to the persistent and seemingly worsening nature of her symptoms she is brought for evaluation. No identifiable exacerbating or alleviating factors to symptoms. Again, patient has Down syndrome but no other significant past medical history. Nursing Notes were reviewed     REVIEW OF SYSTEMS:     Review of Systems   Constitutional: Positive for activity change, appetite change, fatigue and fever. HENT: Negative. Eyes: Negative. Respiratory: Positive for cough and shortness of breath. Cardiovascular: Negative for chest pain. Gastrointestinal: Negative for abdominal pain, diarrhea, nausea and vomiting. Genitourinary: Negative. Musculoskeletal: Negative for gait problem and neck pain. Skin: Negative for color change. Neurological: Positive for headaches. Negative for dizziness and weakness.    All other systems reviewed and are negative. Except as noted above in the ROS, all other systems were reviewed and negative. PAST MEDICAL HISTORY:     Past Medical History:   Diagnosis Date    Down syndrome     Influenza A 03/10/2020         SURGICAL HISTORY:      Past Surgical History:   Procedure Laterality Date    FOOT SURGERY  5/26/11    lapidus procedure and soft tissue release right foot. CURRENT MEDICATIONS:       Previous Medications    CALCIUM CITRATE-VITAMIN D 200-200 MG-UNIT TABS    Take  by mouth daily. DICLOFENAC SODIUM (VOLTAREN) 1 % GEL    Apply 4 g topically 4 times daily    DICLOFENAC SODIUM (VOLTAREN) 1 % GEL    Apply 2 g topically 2 times daily    MEDROXYPROGESTERONE ACETATE (DEPO-PROVERA IM)    Inject  into the muscle See Admin Instructions. Every three months          ALLERGIES:    Patient has no known allergies.     FAMILY HISTORY:       Family History   Problem Relation Age of Onset    Arthritis Mother     Cancer Mother     Diabetes Mother     High Blood Pressure Mother     High Cholesterol Mother     Arthritis Father     Hearing Loss Father     Heart Disease Father     High Blood Pressure Father     High Cholesterol Father     Stroke Father     Arthritis Sister     Cancer Sister     Asthma Maternal Grandmother     Early Death Maternal Grandfather           SOCIAL HISTORY:       Social History     Socioeconomic History    Marital status: Single     Spouse name: None    Number of children: None    Years of education: None    Highest education level: None   Occupational History    None   Social Needs    Financial resource strain: None    Food insecurity     Worry: None     Inability: None    Transportation needs     Medical: None     Non-medical: None   Tobacco Use    Smoking status: Never Smoker    Smokeless tobacco: Never Used   Substance and Sexual Activity    Alcohol use: No    Drug use: No    Sexual activity: None   Lifestyle    Physical activity Days per week: None     Minutes per session: None    Stress: None   Relationships    Social connections     Talks on phone: None     Gets together: None     Attends [employer] service: None     Active member of club or organization: None     Attends meetings of clubs or organizations: None     Relationship status: None    Intimate partner violence     Fear of current or ex partner: None     Emotionally abused: None     Physically abused: None     Forced sexual activity: None   Other Topics Concern    None   Social History Narrative    None       SCREENINGS:             PHYSICAL EXAM:       ED Triage Vitals [01/03/21 1001]   BP Temp Temp Source Pulse Resp SpO2 Height Weight   132/78 99.2 °F (37.3 °C) Oral 109 22 (!) 87 % 4' 11\" (1.499 m) 146 lb (66.2 kg)       Physical Exam    CONSTITUTIONAL: Awake and alert. Cooperative. Well-developed. Well-nourished. Non-toxic. No acute distress. HENT: Normocephalic. Atraumatic. External ears normal, without discharge. No nasal discharge. Oropharynx clear. Mucous membranes moist.  EYES: Conjunctiva non-injected. No scleral icterus. PERRL. EOM's grossly intact. NECK: Supple. Normal ROM. CARDIOVASCULAR: RRR. No Murmer. Intact distal pulses. PULMONARY/CHEST WALL: Effort normal. No tachypnea. Lungs clear to ausculation. ABDOMEN: Normal BS. Soft. Nondistended. No tenderness to palpate. No guarding. /ANORECTAL: Not assessed  MUSKULOSKELETAL: Normal ROM. No acute deformities. No edema. No tenderness to palpate. SKIN: Warm and dry. No rash. NEUROLOGICAL: Alert and oriented x 3. GCS 15. CN II-XII grossly intact. Strength is 5/5 in all extremities and sensation is intact.     PSYCHIATRIC: Normal affect        DIAGNOSTICRESULTS:     LABS:    Results for orders placed or performed during the hospital encounter of 01/03/21   CBC Auto Differential   Result Value Ref Range    WBC 5.2 4.0 - 11.0 K/uL    RBC 4.32 4.00 - 5.20 M/uL    Hemoglobin 13.6 12.0 - 16.0 g/dL    Hematocrit Cardiac silhouette is enlarged. No pneumothorax. No pleural effusion. Multifocal hazy airspace opacities most prominent peripherally and in the bases. Findings as above concerning for viral pneumonia given patient's history. EKG:      See interpretation by Dorinda Harris MD.      PROCEDURES:   N/A    CRITICAL CARE TIME:       Due to the immediate potential for life-threatening deterioration due to Covid pneumonia with hypoxia, I spent 32 minutes providing critical care. This time is excluding time spent performing procedures. CONSULTS:  IP CONSULT TO HOSPITALIST      EMERGENCY DEPARTMENT COURSE and DIFFERENTIAL DIAGNOSIS/MDM:   Vitals:    Vitals:    01/03/21 1026 01/03/21 1123 01/03/21 1158 01/03/21 1223   BP: 113/73 107/70 108/70 99/76   Pulse: 110 110  85   Resp:       Temp:       TempSrc:       SpO2: 96% 99% 96% 95%   Weight:       Height:           Patient was given the following medications:  Medications   0.9 % sodium chloride bolus (0 mLs Intravenous Stopped 1/3/21 1201)   acetaminophen (TYLENOL) tablet 650 mg (650 mg Oral Given 1/3/21 1044)   dexamethasone (DECADRON) injection 6 mg (6 mg Intravenous Given 1/3/21 1050)         Patient was evaluated by both myself and Dorinda Harris MD.   Old records were reviewed. Patient is brought in by family members secondary to positive Covid testing last Monday and persistent fevers, fatigue, decreased oral intake and ultimately developing coughing and shortness of breath. Patient has Down syndrome. On arrival patient is found to be hypoxic at 87% on room air. She is placed on 2 L nasal cannula oxygen and maintained sats in the mid 90s on this.   Patient is given a 1 L bolus of normal saline in the ED with Tylenol 650 mg orally and Decadron 6 mg IV  EKG interpreted by my attending  Portable chest x-ray shows findings concerning for viral pneumonia  CBC White count 5.2 with H&H 13.6 and 41.6  CMP mild hyponatremia 132 but otherwise unremarkable  Troponin negative  Lactate normal at 1.1  Procalcitonin 0.07  Qualitative hCG negative  Patient has remained stable in the ED. She is requiring supplemental oxygen to maintain sats in the 90s. She has been mildly tachycardic but is improving with fluids. Blood pressure is right around 124 systolic. Patient warrants hospitalization for further management of what appears to be Covid pneumonia. I am consulting the hospitalist for admission. I spoke with [facility] hospitalist. We thoroughly discussed the history, physical exam, laboratory and imaging studies, as well as, emergency department course. Based upon that discussion, we've decided to admit Michael Strauss for further observation and evaluation of Gracia New Covid pneumonia with hypoxia. As I have deemed necessary from their history, physical, and studies, I have considered and evaluated Michael Strauss for the following diagnoses:  ACUTE CORONARY SYNDROME, PERICARDIAL TAMPONADE, CHF, SEPSIS, COPD EXACERBATION, PNEUMONIA, PNEUMOTHORAX, PULMONARY EMBOLISM, and THORACIC DISSECTION. FINAL IMPRESSION:      1. Pneumonia due to COVID-19 virus    2. Hypoxia    3. Generalized weakness    4.  Decreased oral intake          DISPOSITION/PLAN:   DISPOSITION Decision To Admit                 (Please note thatportions of this note were completed with a voice recognition program.  Efforts were made to edit the dictations, but occasionally words are mis-transcribed.)    Karlene Farah PA-C (electronicallysigned)              Star Juarez  01/03/21 0426

## 2021-01-03 NOTE — CONSULTS
release right foot. Family History   Problem Relation Age of Onset    Arthritis Mother     Cancer Mother     Diabetes Mother     High Blood Pressure Mother     High Cholesterol Mother     Arthritis Father     Hearing Loss Father     Heart Disease Father     High Blood Pressure Father     High Cholesterol Father     Stroke Father     Arthritis Sister     Cancer Sister     Asthma Maternal Grandmother     Early Death Maternal Grandfather         Social History     Tobacco Use    Smoking status: Never Smoker    Smokeless tobacco: Never Used   Substance Use Topics    Alcohol use: No        No Known Allergies            Physical Exam:  Blood pressure 114/81, pulse 103, temperature 98.7 °F (37.1 °C), temperature source Oral, resp. rate 18, height 4' 11\" (1.499 m), weight 146 lb (66.2 kg), SpO2 92 %, not currently breastfeeding.'       In-person bedside physical examination deferred. Pursuant to the emergency declaration under the [address] [place] [address] and the [employer] General Act, this clinical encounter was conducted to provide necessary medical care. (Also consistent with new provisions and guidance offered by [facility] on March 18, 2020 in setting of COVID 19 outbreak and in order to preserve personal protective equipment in accordance with the flexibilities announced by CMS on March 30, 2020)   References: https://med. [facility]/Portals/0/Resources/COVID-19/3_18%20Telemed%20Guidance%20Updated%20March%2018. pdf?[email or web address]                      https://[facility]. [facility]/Portals/0/Resources/COVID-19/3_18%20Telemed%20Guidance%20Updated%20March%2018. pdf?bde=[email or web address]                      [email or web address] pdf             Results:  CBC:   Recent Labs     01/03/21  1057   WBC 5.2   HGB 13.6   HCT 41.2   MCV 95.4   PLT 140     BMP:   Recent Labs     01/03/21  1057   *   K 5.0   CL 99   CO2 24   BUN 15   CREATININE 0.7     LIVER PROFILE:   Recent Labs     01/03/21  1057   AST 39*   ALT 33   BILITOT 0.4   ALKPHOS 85       Imaging:  I have reviewed radiology images personally. XR CHEST PORTABLE   Final Result   Findings as above concerning for viral pneumonia given patient's history. Xr Chest Portable    Result Date: 1/3/2021  EXAMINATION: ONE XRAY VIEW OF THE CHEST 1/3/2021 10:32 am COMPARISON: March 10, 2020 HISTORY: ORDERING SYSTEM PROVIDED HISTORY: cough, SOB, COVID+ TECHNOLOGIST PROVIDED HISTORY: Reason for exam:->cough, SOB, COVID+ Reason for Exam: covid FINDINGS: Cardiac silhouette is enlarged. No pneumothorax. No pleural effusion. Multifocal hazy airspace opacities most prominent peripherally and in the bases. Findings as above concerning for viral pneumonia given patient's history. Results for Jo Rhodes (MRN [id number]) as of 1/3/2021 14:58   Ref. Range 1/3/2021 10:57   Sodium Latest Ref Range: 136 - 145 mmol/L 132 (L)   Potassium Latest Ref Range: 3.5 - 5.1 mmol/L 5.0   Chloride Latest Ref Range: 99 - 110 mmol/L 99   CO2 Latest Ref Range: 21 - 32 mmol/L 24   BUN Latest Ref Range: 7 - 20 mg/dL 15   Creatinine Latest Ref Range: 0.6 - 1.1 mg/dL 0.7   Anion Gap Latest Ref Range: 3 - 16  9   GFR Non- Latest Ref Range: >60  >60   GFR  Latest Ref Range: >60  >60   Lactic Acid Latest Ref Range: 0.4 - 2.0 mmol/L 1.1   Glucose Latest Ref Range: 70 - 99 mg/dL 106 (H)   Calcium Latest Ref Range: 8.3 - 10.6 mg/dL 9.0   Total Protein Latest Ref Range: 6.4 - 8.2 g/dL 7.5   Procalcitonin Latest Ref Range: 0.00 - 0.15 ng/mL 0.07     Results for Jo Rhodes (MRN [id number]) as of 1/3/2021 14:58   Ref.  Range 1/3/2021 10:57   Albumin Latest Ref Range: 3.4 - 5.0 g/dL 3.7   Globulin Latest Units: g/dL 3.8   Albumin/Globulin Ratio Latest Ref Range: 1.1 - 2.2  1.0 (L)   Alk Phos Latest Ref Range: 40 - 129 U/L 85   ALT Latest Ref Range: 10 - 40 U/L 33   AST Latest Ref Range: 15 - 37 U/L 39 (H)   Bilirubin Latest Ref Range: 0.0 - 1.0 mg/dL 0.4   Total Protein Latest Ref Range: 6.4 - 8.2 g/dL 7.5       Echocardiogram: None in Epic   PFT: None in Epic         Assessment:  Active Problems:    Pneumonia due to COVID-19 virus    Pulmonary infiltrates    Hypoxemia    Hyponatremia    Down's syndrome  Resolved Problems:    * No resolved hospital problems.  *          Plan:   · Oxygen supplementation to keep saturation between 90 and 94% only  · Please titrate the oxygen as per the above parameters  · Monitor for any worsening respite compromise or hypoxemia  · Patient was given 1 dose of Decadron in the ER and patient has been started on IV Solu-Medrol which can be continued  · Given the patient's clinical status and the radiology along with the labs-we will give patient some remdesivir  · Patient to be given 1 unit of convalescent plasma  · Patient may require gentle hydration-patient was given a fluid bolus in the ER as per documentation  · Monitor input output and BMP  · Correct electrolytes on whenever necessary basis  · Lovenox 30 mg subcutaneously twice a day has been started  · Monitor for any bleeding  · Trend the inflammatory markers  · PUD prophylaxis as per intervention team    Case discussed with pharmacy          Electronically signed by:  Radha Patterson MD    1/3/2021    3:05 PM.

## 2021-01-03 NOTE — PROGRESS NOTES
PULM/CCM     No other recommendations from Pulmonary/CCM stand point -will sign off -please call on PRN basis if the respiratory status is worsening     Edgar Sarah MD

## 2021-01-03 NOTE — ED NOTES
Fall risk screening completed.  Fall risk bracelet applied to patient.  Non-skid socks provided and placed on patient. Laila Layer fall risk is indicated using  dome light .  Based on score, a bed alarm was indicated and applied.  The call light is within the patient's reach, and instructions for use were provided.  The bed is in the lowest position with wheels locked.  The patient has been advised to notify staff, using the call light, if there is a need to get up or use restroom.  The patient verbalized understanding of safety precautions and how to contact staff for assistance.          Tre Calloway RN  01/03/21 5342

## 2021-01-03 NOTE — ED NOTES
PHARMACY CALLED FOR ANTI-VIRAL DRIP. PHARMACY STATES THAT THE DRIP WILL BE DELIVERED SHORTLY.       Darryle School, CASEY  01/03/21 9815

## 2021-01-03 NOTE — H&P
Hospital Medicine  History and Physical    Patient:  Kike Lopez  MRN: [id number]    CHIEF COMPLAINT:  Cough, fatigue, short of breath, and fever - tested positive for COVID on Monday    History Obtained From:  patient, family member - mother, electronic medical record  Primary Care Physician: Herve Caldera MD    HISTORY OF PRESENT ILLNESS:   The patient is a 39 y.o. female who presents with COVID-19 and hypoxia. Patient has Down Syndrome. She lives with family who have Triston Vaughan. Father with COVID. She tested positive last Monday (12/28). Fevers, decreased oral intake, hypoxic at 87% on room air on arrival.      Past Medical History:      Diagnosis Date    Down syndrome     Influenza A 03/10/2020       Past Surgical History:      Procedure Laterality Date    FOOT SURGERY  5/26/11    lapidus procedure and soft tissue release right foot. Medications Prior to Admission:    Prior to Admission medications    Medication Sig Start Date End Date Taking? Authorizing Provider   diclofenac sodium (VOLTAREN) 1 % GEL Apply 2 g topically 2 times daily 9/13/17  Yes Alin Knutson MD   diclofenac sodium (VOLTAREN) 1 % GEL Apply 4 g topically 4 times daily 5/13/16  Yes Rhiannon Zamarripa PA-C   MedroxyPROGESTERone Acetate (DEPO-PROVERA IM) Inject  into the muscle See Admin Instructions. Every three months    Yes Historical Provider, MD   Calcium Citrate-Vitamin D 200-200 MG-UNIT TABS Take  by mouth daily. Yes Historical Provider, MD       Allergies:  Patient has no known allergies. Social History:   TOBACCO:   reports that she has never smoked. She has never used smokeless tobacco.  ETOH:   reports no history of alcohol use.     Family History:       Problem Relation Age of Onset    Arthritis Mother     Cancer Mother     Diabetes Mother     High Blood Pressure Mother     High Cholesterol Mother     Arthritis Father     Hearing Loss Father     Heart Disease Father     High Blood Pressure Father 96%).  Systemic steroid therapy with methylprednisolone. Transfuse 1-unit convalescent plasma. Already on oxygen and so would qualify for remdesivir. No need for antibiotic therapy. 2. Hypovolemic hyponatremia:  Volume expansion with IV saline x 1 liter, then stop to avoid volume overload in setting of SARS-CoV-2.  3. Down Syndrome:  Lives with family. Relatively high functioning at baseline. Advance Directive: Full code. DVT prophylaxis with enoxaparin 30 mg sub-Q BID. Discharge planning:  Admit to inpatient status for evaluation and management that will require at least two midnights of acute hospitalization. She could be discharged with a pulse oximeter and oxygen.        Ana Flores MD  Admitting Hospitalist

## 2021-01-04 LAB
APTT: 63.5 SEC (ref 24.2–36.2)
APTT: 66.2 SEC (ref 24.2–36.2)
BASOPHILS ABSOLUTE: 0 K/UL (ref 0–0.2)
BASOPHILS RELATIVE PERCENT: 0.1 %
BLOOD BANK DISPENSE STATUS: NORMAL
BLOOD BANK PRODUCT CODE: NORMAL
BPU ID: NORMAL
D DIMER: >5250 NG/ML DDU (ref 0–229)
DESCRIPTION BLOOD BANK: NORMAL
EOSINOPHILS ABSOLUTE: 0 K/UL (ref 0–0.6)
EOSINOPHILS RELATIVE PERCENT: 0 %
FERRITIN: 705.7 NG/ML (ref 15–150)
HCT VFR BLD CALC: 36.9 % (ref 36–48)
HEMOGLOBIN: 12.4 G/DL (ref 12–16)
LYMPHOCYTES ABSOLUTE: 0.8 K/UL (ref 1–5.1)
LYMPHOCYTES RELATIVE PERCENT: 15.4 %
MCH RBC QN AUTO: 31.8 PG (ref 26–34)
MCHC RBC AUTO-ENTMCNC: 33.6 G/DL (ref 31–36)
MCV RBC AUTO: 94.6 FL (ref 80–100)
MONOCYTES ABSOLUTE: 0.5 K/UL (ref 0–1.3)
MONOCYTES RELATIVE PERCENT: 9.4 %
NEUTROPHILS ABSOLUTE: 4 K/UL (ref 1.7–7.7)
NEUTROPHILS RELATIVE PERCENT: 75.1 %
PDW BLD-RTO: 13.5 % (ref 12.4–15.4)
PLATELET # BLD: 151 K/UL (ref 135–450)
PMV BLD AUTO: 8.4 FL (ref 5–10.5)
RBC # BLD: 3.9 M/UL (ref 4–5.2)
TSH REFLEX: 2.52 UIU/ML (ref 0.27–4.2)
VITAMIN D 25-HYDROXY: 24.5 NG/ML
WBC # BLD: 5.4 K/UL (ref 4–11)

## 2021-01-04 PROCEDURE — 85379 FIBRIN DEGRADATION QUANT: CPT

## 2021-01-04 PROCEDURE — 1200000000 HC SEMI PRIVATE

## 2021-01-04 PROCEDURE — 6360000002 HC RX W HCPCS: Performed by: INTERNAL MEDICINE

## 2021-01-04 PROCEDURE — 2580000003 HC RX 258: Performed by: INTERNAL MEDICINE

## 2021-01-04 PROCEDURE — 85025 COMPLETE CBC W/AUTO DIFF WBC: CPT

## 2021-01-04 PROCEDURE — 94760 N-INVAS EAR/PLS OXIMETRY 1: CPT

## 2021-01-04 PROCEDURE — 85730 THROMBOPLASTIN TIME PARTIAL: CPT

## 2021-01-04 PROCEDURE — 6370000000 HC RX 637 (ALT 250 FOR IP): Performed by: INTERNAL MEDICINE

## 2021-01-04 PROCEDURE — 94761 N-INVAS EAR/PLS OXIMETRY MLT: CPT

## 2021-01-04 PROCEDURE — 2500000003 HC RX 250 WO HCPCS: Performed by: INTERNAL MEDICINE

## 2021-01-04 PROCEDURE — 36415 COLL VENOUS BLD VENIPUNCTURE: CPT

## 2021-01-04 PROCEDURE — 93010 ELECTROCARDIOGRAM REPORT: CPT | Performed by: INTERNAL MEDICINE

## 2021-01-04 PROCEDURE — 2700000000 HC OXYGEN THERAPY PER DAY

## 2021-01-04 RX ADMIN — Medication 10 ML: at 10:02

## 2021-01-04 RX ADMIN — SODIUM CHLORIDE: 9 INJECTION, SOLUTION INTRAVENOUS at 10:03

## 2021-01-04 RX ADMIN — Medication 10 ML: at 15:14

## 2021-01-04 RX ADMIN — METHYLPREDNISOLONE SODIUM SUCCINATE 40 MG: 40 INJECTION, POWDER, FOR SOLUTION INTRAMUSCULAR; INTRAVENOUS at 15:14

## 2021-01-04 RX ADMIN — Medication 2000 UNITS: at 10:02

## 2021-01-04 RX ADMIN — METHYLPREDNISOLONE SODIUM SUCCINATE 40 MG: 40 INJECTION, POWDER, FOR SOLUTION INTRAMUSCULAR; INTRAVENOUS at 02:56

## 2021-01-04 RX ADMIN — SODIUM CHLORIDE 30 ML: 9 INJECTION, SOLUTION INTRAVENOUS at 16:36

## 2021-01-04 RX ADMIN — Medication 10 ML: at 19:58

## 2021-01-04 RX ADMIN — REMDESIVIR 100 MG: 100 INJECTION, POWDER, LYOPHILIZED, FOR SOLUTION INTRAVENOUS at 16:35

## 2021-01-04 ASSESSMENT — PAIN SCALES - GENERAL
PAINLEVEL_OUTOF10: 0
PAINLEVEL_OUTOF10: 0

## 2021-01-04 NOTE — ED NOTES
Called C5 charge nurse Lalit Delacruz who okayed Patient's guardian to come up with patient to inpatient room. Patient transported to inpatient unit via stretcher. Patient care transferred to inpatient nurse at this time. Patient stable at time of transfer. Discussed during nurse to nurse report was, including but not limited to: client's purpose of admission and/or transfer, initial and current mental status, vital signs, medication interventions or procedures, abnormal test results, and significant events or changes that occurred during the admission. The receiving nurse was prompted to ask questions and informed that the current department staff could be contacted for additional questions if needed. Report discussed with Sierra PEÑA.       Lemuel Mi RN  01/03/21 0923

## 2021-01-04 NOTE — CARE COORDINATION
CASE MANAGEMENT INITIAL ASSESSMENT      Reviewed chart and completed assessment via telephone with: Pt's mother, father, and sister all over the phone via conference call. Explained Case Management role/services. Primary contact information: mother and father at [address] : Mother is legal Guardian per family report. Mother can best be reached on father's cell phone of [phone]    Can this person be reached and be able to respond quickly, such as within a few minutes or hours? Yes    Admit date/status: 1/3/2021  Diagnosis: PNA due to COVID  Is this a Readmission?:  No      Insurance: Medicare/ Medicaid   Precert required for SNF: No       3 night stay required: No    Living arrangements, Adls, care needs, prior to admission: Pt lives at home with her mother and her father. Her sister lives next door. Transportation: Father or sister will pick pt up at discharge. Durable Medical Equipment at home:  N/AWalker__Cane__RTS__ BSC__Shower Chair__  02__ HHN__ CPAP__  BiPap__  Hospital Bed__ W/C___ Other__________    Services in the home and/or outpatient, prior to admission: N/A    PT/OT recs: Sw will follow     Hospital Exemption Notification (HEN):N/A    Barriers to discharge:N/A    Plan/comments: Sw spoke with pt's family who notes that pt will return home at discharge. Pt lives with her parents 24/7 and has the support of her sister who lives next door. Pt was active at [facility] as well as [facility] expand program. Jeffry will follow and assist as able.      ECOC on chart for MD signature

## 2021-01-04 NOTE — PROGRESS NOTES
[facility] called regarding patients CTPE. Dr. Diann Crowder stated patient has a PE in RLL and possibly in RUL. High dose heparin gtt has been ordered.    Nisa Harvey CNP

## 2021-01-04 NOTE — PROGRESS NOTES
RN called and spoke with patient mother, father, and sister via phone. All questions and concerns were addressed at this time. RN gave family unit phone number to call for updates. RN will continue to monitor.

## 2021-01-04 NOTE — PROGRESS NOTES
4 Eyes Skin Assessment     The patient is being assess for   Admission    I agree that 2 RN's have performed a thorough Head to Toe Skin Assessment on the patient. ALL assessment sites listed below have been assessed. Areas assessed by both nurses:   [x]   Head, Face, and Ears   [x]   Shoulders, Back, and Chest, Abdomen  [x]   Arms, Elbows, and Hands   [x]   Coccyx, Sacrum, and Ischium  [x]   Legs, Feet, and Heels        No skin issues noted. Co-signer eSignature: Electronically signed by Claudia Morrison RN on 1/4/21 at 2:12 PM EST    Does the Patient have Skin Breakdown?   No          Ellis Prevention initiated:  NA   Wound Care Orders initiated:  NA      WOC nurse consulted for Pressure Injury (Stage 3,4, Unstageable, DTI, NWPT, Complex wounds)and New or Established Ostomies:  NA      Primary Nurse eSignature: Electronically signed by Jarrell Bowers RN on 1/4/21 at 8:17 AM EST

## 2021-01-04 NOTE — CONSULTS
Pharmacy to Manage Heparin Infusion per [facility]    Dx: PE  Pt wt = 53.8 kg (adjusted body weight). Baseline aPTT = pending. High Dose Heparin Infusion  Heparin 4300 units IVP bolus followed by Heparin infusion at 9.7 mL/hr. Recheck aPTT in 6 hours. Goal aPTT = 49-76 seconds. aPTT < 36.3 Heparin 80 units/kg bolus  Increase infusion by 4 units/kg/hr  aPTT 36.3 - 48.9   Heparin 40 units/kg bolus Increase infusion by 2 units/kg/hr  aPTT 49 - 76 No bolus No change   aPTT 76.1 - 85   No bolus Decrease infusion by 2 units/kg/hr  aPTT 85.1 - 94    Hold heparin for 1 hour Decrease infusion by 3 units/kg/hr  aPTT 94.1 - 103  Hold heparin for 1 hour Decrease infusion by 4 units/kg/hr  aPTT > 103 Hold heparin for 1 hour Decrease infusion by 6 units/kg/hr    Obtain aPTT 6 hours after bolus and 6 hours after any dose change until two consecutive therapeutic aPTT are achieved- then daily. Dmitriy Peacock, PharmD 1/3/2021  10:37 PM     1/4 0631  aPTT - 66.2 sec  Continue heparin drip at 9.7 mL/hr  Next aPTT 1230  Diaz Vanessa PharmD 1/4/2021 7:57 AM    1/4 1216  aPTT - 63.5 sec  Continue heparin drip at 9.7 mL/hr  Start aPTT daily 1/5  Syeda Jimenez PharmD 1/4/2021 1:04 PM    1/5 @1022  aPTT - 60 sec  Continue heparin drip at 9.7 mL/hr  Daily aPTT   Darronicom, R. Ph. 1/5/2021 11:50 AM    1/6 0640  aPTT = 72.4 sec  Continue with current heparin drip rate of 9.7 ml/hr  Daily aPTT ordered  Yosef Billy PharmD  1/6/2021 at 8:40 AM    1/7 0702  aPTT = 74.4 sec  Continue with current heparin rate of 9.7 ml/hr  Daily aPTT ordered  Yosef Billy PharmD  1/7/2021 at 8:21 AM    1/8 0644  aPTT = 86.2 sec  Hold heparin drip for 1 hour then decrease rate to 8.1 ml/hr  Recheck aPTT in 6 hours at [address] PharmD  1/8/2021 at 8:10 AM

## 2021-01-04 NOTE — PROGRESS NOTES
Hospitalist Progress Note    Date of Admission: 1/3/2021    Chief Complaint: Hypoxia    Hospital Course: The patient is a 39 y.o. female who presents with COVID-19 and hypoxia. Patient has Down Syndrome. She lives with family who have Ministerio. Father with COVID. She tested positive last Monday (12/28). Fevers, decreased oral intake, hypoxic at 87% on room air on arrival.      Subjective: Alert, slightly improved O2 requirement. Labs:   Recent Labs     01/03/21  1057 01/03/21  2241 01/04/21  0631   WBC 5.2 3.3* 5.4   HGB 13.6 12.3 12.4   HCT 41.2 37.2 36.9    141 151     Recent Labs     01/03/21  1057   *   K 5.0   CL 99   CO2 24   BUN 15   CREATININE 0.7   CALCIUM 9.0     Recent Labs     01/03/21  1057   AST 39*   ALT 33   BILITOT 0.4   ALKPHOS 85     No results for input(s): INR in the last 72 hours. Physical Exam Performed:    /80   Pulse 75   Temp 97.4 °F (36.3 °C) (Oral)   Resp 18   Ht 4' 11\" (1.499 m)   Wt 150 lb (68 kg)   SpO2 95%   BMI 30.30 kg/m²   I performed an audiovisual assessment, based on new provisions and guidance offered by [facility] on March 18, 2020 in setting of COVID-19 outbreak and in order to preserve personal protective equipment in accordance with the flexibilities announced by CMS on March 30, 2020. References:   https://med. [email or web address]/Resources/COVID-19/3_18%20Telemed%20Guidance%20Updated%20March%2018. pdf?cjn=[email or web address]   [email or web address] pdf     Bedside physical examination deferred. However, I did visually inspect the patient and observed the following:     General appearance: No apparent distress, appears stated age and cooperative. Neck: Deferred. Respiratory:  Normal respiratory effort. Cardiovascular: Deferred. Abdomen: Deferred. Musculoskelatal: Deferred. Neurologic:  Deferred.   Psychiatric: Alert and oriented  Skin: Deferred. Assessment/Plan:    Active Hospital Problems    Diagnosis    Pneumonia due to COVID-19 virus [U07.1, J12.82]    Pulmonary infiltrates [R91.8]    Hypoxemia [R09.02]    Hyponatremia [E87.1]    Down's syndrome [Q90.9]     Acute Respiratory Failure with Hypoxia 2/2 COVID-19 pneumonia:  COVID-19 Pneumonia with Acute Hypoxic Respiratory Failure:  Symptom onset: Unclear  Diagnosis: 12/28/20  Admission: 1/3/21  Treatments:    - Steroids: Solumedrol 40 mg q12   - Remdesivir started 1/3   - Convalescent Plasma given 1/3  Oxygen Requirement: 3 L by NC  DVT Prophylaxis: Heparin drip  Isolation: 20 days    Acute Pulmonary Embolism: Heparin drip    Hypovolemic hyponatremia:  Volume expansion with IV saline x 1 liter, then stop to avoid volume overload in setting of SARS-CoV-2. Down Syndrome:  Lives with family. Relatively high functioning at baseline.       DVT Prophylaxis: Heparin drip  Diet: DIET GENERAL;  Code Status: Full Code  PT/OT Eval Status: Ambulatory     Dispo - > 2 days    Dorie Mcgee MD

## 2021-01-04 NOTE — PROGRESS NOTES
Pt brought to floor from ER accompanied by ER RN and pt's sister. Pt's sister answered admission questions, and pt's sister was told later she had to leave per hospital policy. Pt's sister refused, stating her sister the pt has never stayed by herself in the hospital and cannot make medical decisions for herself. This RN notified the charge nurse. Charge nurse explained to pt's sister hospital policy, however pt's sister refused to leave, stating she would have to be taken by force,saying she would sign the pt out and/or transfer the pt to a different hospital. Charge nurse explained the risk of leaving the hospital AMA and called the .  stated the sister cannot stay. The clinical , security, nurse practitioner, and doctor came and discussed the situation with the pt's sister, and pt's sister agreed to leave. Will continue to monitor.

## 2021-01-05 LAB
APTT: 60 SEC (ref 24.2–36.2)
HCT VFR BLD CALC: 37.8 % (ref 36–48)
HEMOGLOBIN: 12.6 G/DL (ref 12–16)
MCH RBC QN AUTO: 31.9 PG (ref 26–34)
MCHC RBC AUTO-ENTMCNC: 33.2 G/DL (ref 31–36)
MCV RBC AUTO: 96 FL (ref 80–100)
PDW BLD-RTO: 13.5 % (ref 12.4–15.4)
PLATELET # BLD: 168 K/UL (ref 135–450)
PMV BLD AUTO: 8.8 FL (ref 5–10.5)
RBC # BLD: 3.94 M/UL (ref 4–5.2)
WBC # BLD: 10.6 K/UL (ref 4–11)

## 2021-01-05 PROCEDURE — 36415 COLL VENOUS BLD VENIPUNCTURE: CPT

## 2021-01-05 PROCEDURE — 94761 N-INVAS EAR/PLS OXIMETRY MLT: CPT

## 2021-01-05 PROCEDURE — 6370000000 HC RX 637 (ALT 250 FOR IP): Performed by: INTERNAL MEDICINE

## 2021-01-05 PROCEDURE — 2500000003 HC RX 250 WO HCPCS: Performed by: INTERNAL MEDICINE

## 2021-01-05 PROCEDURE — 6360000002 HC RX W HCPCS: Performed by: INTERNAL MEDICINE

## 2021-01-05 PROCEDURE — 2580000003 HC RX 258: Performed by: INTERNAL MEDICINE

## 2021-01-05 PROCEDURE — 2500000003 HC RX 250 WO HCPCS: Performed by: NURSE PRACTITIONER

## 2021-01-05 PROCEDURE — 1200000000 HC SEMI PRIVATE

## 2021-01-05 PROCEDURE — 2700000000 HC OXYGEN THERAPY PER DAY

## 2021-01-05 PROCEDURE — 85027 COMPLETE CBC AUTOMATED: CPT

## 2021-01-05 PROCEDURE — 85730 THROMBOPLASTIN TIME PARTIAL: CPT

## 2021-01-05 RX ADMIN — METHYLPREDNISOLONE SODIUM SUCCINATE 40 MG: 40 INJECTION, POWDER, FOR SOLUTION INTRAMUSCULAR; INTRAVENOUS at 14:09

## 2021-01-05 RX ADMIN — METHYLPREDNISOLONE SODIUM SUCCINATE 40 MG: 40 INJECTION, POWDER, FOR SOLUTION INTRAMUSCULAR; INTRAVENOUS at 02:05

## 2021-01-05 RX ADMIN — REMDESIVIR 100 MG: 100 INJECTION, POWDER, LYOPHILIZED, FOR SOLUTION INTRAVENOUS at 16:12

## 2021-01-05 RX ADMIN — HEPARIN SODIUM 9.7 ML/HR: 10000 INJECTION, SOLUTION INTRAVENOUS at 02:13

## 2021-01-05 RX ADMIN — Medication 2000 UNITS: at 10:53

## 2021-01-05 ASSESSMENT — PAIN DESCRIPTION - PAIN TYPE: TYPE: ACUTE PAIN

## 2021-01-05 ASSESSMENT — PAIN SCALES - GENERAL
PAINLEVEL_OUTOF10: 0

## 2021-01-05 ASSESSMENT — PAIN DESCRIPTION - LOCATION: LOCATION: ABDOMEN;THROAT

## 2021-01-05 NOTE — CARE COORDINATION
Pt followed by IM and currently on 3 L O2, 0 at baseline. Pt has Down Syndrome and lives w/both parents who have Covid, sister lives next door. Pt will return home w/family when medically stable. CM will continue to follow for needs, including home O2 should it be necessary.   Mihaela Choi RN

## 2021-01-05 NOTE — PROGRESS NOTES
Hospitalist Progress Note    Date of Admission: 1/3/2021    Chief Complaint: Hypoxia    Hospital Course: The patient is a 39 y.o. female who presents with COVID-19 and hypoxia. Patient has Down Syndrome. She lives with family who have Ministerio. Father with COVID. She tested positive last Monday (12/28). Fevers, decreased oral intake, hypoxic at 87% on room air on arrival.      Subjective: Denies SOB. States she's tired. Improving O2 requirement. Labs:   Recent Labs     01/03/21  2241 01/04/21  0631 01/05/21  1023   WBC 3.3* 5.4 10.6   HGB 12.3 12.4 12.6   HCT 37.2 36.9 37.8    151 168     Recent Labs     01/03/21  1057   *   K 5.0   CL 99   CO2 24   BUN 15   CREATININE 0.7   CALCIUM 9.0     Recent Labs     01/03/21  1057   AST 39*   ALT 33   BILITOT 0.4   ALKPHOS 85     No results for input(s): INR in the last 72 hours. Physical Exam Performed:    /80   Pulse 109   Temp 97.4 °F (36.3 °C) (Axillary)   Resp 18   Ht 4' 11\" (1.499 m)   Wt 150 lb (68 kg)   SpO2 94%   BMI 30.30 kg/m²   I performed an audiovisual assessment, based on new provisions and guidance offered by [facility] on March 18, 2020 in setting of COVID-19 outbreak and in order to preserve personal protective equipment in accordance with the flexibilities announced by CMS on March 30, 2020. References:   https://med. [place].[facility]/Portals/0/Resources/COVID-19/3_18%20Telemed%20Guidance%20Updated%20March%2018. pdf?bgs=[email or web address]   http://Averail/. pdf     Bedside physical examination deferred. However, I did visually inspect the patient and observed the following:     General appearance: No apparent distress, appears stated age and cooperative. Neck: Deferred. Respiratory:  Normal respiratory effort. Cardiovascular: Deferred. Abdomen: Deferred. Musculoskelatal: Deferred. Neurologic:  Deferred.   Psychiatric: Alert and oriented  Skin: Deferred. Assessment/Plan:    Active Hospital Problems    Diagnosis    Pneumonia due to COVID-19 virus [U07.1, J12.82]    Pulmonary infiltrates [R91.8]    Hypoxemia [R09.02]    Hyponatremia [E87.1]    Down's syndrome [Q90.9]     Acute Respiratory Failure with Hypoxia 2/2 COVID-19 pneumonia:  COVID-19 Pneumonia with Acute Hypoxic Respiratory Failure:  Symptom onset: Unclear  Diagnosis: 12/28/20  Admission: 1/3/21  Treatments:    - Steroids: Solumedrol 40 mg q12   - Remdesivir started 1/3   - Convalescent Plasma given 1/3  Oxygen Requirement: 2 L by NC  DVT Prophylaxis: Heparin drip  Isolation: 20 days    Acute Pulmonary Embolism: Heparin drip    Hypovolemic hyponatremia:  Volume expansion with IV saline x 1 liter, then stop to avoid volume overload in setting of SARS-CoV-2. Down Syndrome:  Lives with family. Relatively high functioning at baseline.       DVT Prophylaxis: Heparin drip  Diet: DIET GENERAL;  Code Status: Full Code  PT/OT Eval Status: Ambulatory     Dispo - ?2 days    Tayo Michaud MD

## 2021-01-06 LAB — APTT: 72.4 SEC (ref 24.2–36.2)

## 2021-01-06 PROCEDURE — 6370000000 HC RX 637 (ALT 250 FOR IP): Performed by: INTERNAL MEDICINE

## 2021-01-06 PROCEDURE — 2700000000 HC OXYGEN THERAPY PER DAY

## 2021-01-06 PROCEDURE — 2580000003 HC RX 258: Performed by: INTERNAL MEDICINE

## 2021-01-06 PROCEDURE — 6360000002 HC RX W HCPCS: Performed by: INTERNAL MEDICINE

## 2021-01-06 PROCEDURE — 2500000003 HC RX 250 WO HCPCS: Performed by: NURSE PRACTITIONER

## 2021-01-06 PROCEDURE — 36415 COLL VENOUS BLD VENIPUNCTURE: CPT

## 2021-01-06 PROCEDURE — 94761 N-INVAS EAR/PLS OXIMETRY MLT: CPT

## 2021-01-06 PROCEDURE — 2500000003 HC RX 250 WO HCPCS: Performed by: INTERNAL MEDICINE

## 2021-01-06 PROCEDURE — 85730 THROMBOPLASTIN TIME PARTIAL: CPT

## 2021-01-06 PROCEDURE — 1200000000 HC SEMI PRIVATE

## 2021-01-06 RX ADMIN — HEPARIN SODIUM 9.7 ML/HR: 10000 INJECTION, SOLUTION INTRAVENOUS at 04:37

## 2021-01-06 RX ADMIN — METHYLPREDNISOLONE SODIUM SUCCINATE 40 MG: 40 INJECTION, POWDER, FOR SOLUTION INTRAMUSCULAR; INTRAVENOUS at 15:12

## 2021-01-06 RX ADMIN — Medication 10 ML: at 19:58

## 2021-01-06 RX ADMIN — Medication 2000 UNITS: at 08:32

## 2021-01-06 RX ADMIN — Medication 10 ML: at 08:32

## 2021-01-06 RX ADMIN — REMDESIVIR 100 MG: 100 INJECTION, POWDER, LYOPHILIZED, FOR SOLUTION INTRAVENOUS at 15:12

## 2021-01-06 RX ADMIN — METHYLPREDNISOLONE SODIUM SUCCINATE 40 MG: 40 INJECTION, POWDER, FOR SOLUTION INTRAMUSCULAR; INTRAVENOUS at 00:07

## 2021-01-06 ASSESSMENT — PAIN SCALES - GENERAL: PAINLEVEL_OUTOF10: 0

## 2021-01-06 ASSESSMENT — PAIN DESCRIPTION - PAIN TYPE: TYPE: ACUTE PAIN

## 2021-01-06 NOTE — CARE COORDINATION
Pt on 2.5 L O2 at this time, none at home. Pt has Down  Syndrome and plans to return home w/family when medically stable. CM will continue to watch for needs, including possible home O2.   Levi Councilman, RN

## 2021-01-06 NOTE — PROGRESS NOTES
Hospitalist Progress Note    Date of Admission: 1/3/2021    Chief Complaint: Hypoxia    Hospital Course: The patient is a 39 y.o. female who presents with COVID-19 and hypoxia. Patient has Down Syndrome. She lives with family who have Ministerio. Father with COVID. She tested positive last Monday (12/28). Fevers, decreased oral intake, hypoxic at 87% on room air on arrival.      Subjective: Complain of some abdominal pain and constipation this morning denies SOB. However she now reports she was able to have a bowel movement. Denies any shortness of breath. Continues to have improving oxygen requirement. Labs:   Recent Labs     01/03/21  2241 01/04/21  0631 01/05/21  1023   WBC 3.3* 5.4 10.6   HGB 12.3 12.4 12.6   HCT 37.2 36.9 37.8    151 168     No results for input(s): NA, K, CL, CO2, BUN, CREATININE, CALCIUM, PHOS in the last 72 hours. Invalid input(s): MAGNES  No results for input(s): AST, ALT, BILIDIR, BILITOT, ALKPHOS in the last 72 hours. No results for input(s): INR in the last 72 hours. Physical Exam Performed:    /87   Pulse 81   Temp 98.1 °F (36.7 °C) (Oral)   Resp 18   Ht 4' 11\" (1.499 m)   Wt 150 lb (68 kg)   SpO2 93%   BMI 30.30 kg/m²   I performed an audiovisual assessment, based on new provisions and guidance offered by [facility] on March 18, 2020 in setting of COVID-19 outbreak and in order to preserve personal protective equipment in accordance with the flexibilities announced by CMS on March 30, 2020. References:   https://med. [place].gov/Portals/0/Resources/COVID-19/3_18%20Telemed%20Guidance%20Updated%20March%2018. pdf?rzt=[email or web address]   http://[employer]/. pdf     Bedside physical examination deferred. However, I did visually inspect the patient and observed the following:     General appearance: No apparent distress, appears stated age and cooperative.   Neck: Deferred. Respiratory:  Normal respiratory effort. Cardiovascular: Deferred. Abdomen: Deferred. Musculoskelatal: Deferred. Neurologic:  Deferred. Psychiatric: Alert and oriented  Skin: Deferred. Assessment/Plan:    Active Hospital Problems    Diagnosis    Pneumonia due to COVID-19 virus [U07.1, J12.82]    Pulmonary infiltrates [R91.8]    Hypoxemia [R09.02]    Hyponatremia [E87.1]    Down's syndrome [Q90.9]     Acute Respiratory Failure with Hypoxia 2/2 COVID-19 pneumonia:  COVID-19 Pneumonia with Acute Hypoxic Respiratory Failure:  Symptom onset: Unclear  Diagnosis: 12/28/20  Admission: 1/3/21  Treatments:    - Steroids: Solumedrol 40 mg q12   - Remdesivir started 1/3   - Convalescent Plasma given 1/3  Oxygen Requirement: 2 L by NC, although still with desaturations with activity   DVT Prophylaxis: Heparin drip  Isolation: 20 days    Acute Pulmonary Embolism: Heparin drip    Hypovolemic hyponatremia:  Volume expansion given    Down Syndrome:  Lives with family. Relatively high functioning at baseline.       DVT Prophylaxis: Heparin drip  Diet: DIET GENERAL;  Code Status: Full Code  PT/OT Eval Status: Ambulatory     Dispo - ?1-2 days    Tayo Michaud MD

## 2021-01-07 LAB
A/G RATIO: 0.9 (ref 1.1–2.2)
ALBUMIN SERPL-MCNC: 3.3 G/DL (ref 3.4–5)
ALP BLD-CCNC: 87 U/L (ref 40–129)
ALT SERPL-CCNC: 110 U/L (ref 10–40)
ANION GAP SERPL CALCULATED.3IONS-SCNC: 11 MMOL/L (ref 3–16)
APTT: 74.4 SEC (ref 24.2–36.2)
AST SERPL-CCNC: 42 U/L (ref 15–37)
BILIRUB SERPL-MCNC: 0.3 MG/DL (ref 0–1)
BUN BLDV-MCNC: 21 MG/DL (ref 7–20)
CALCIUM SERPL-MCNC: 8.8 MG/DL (ref 8.3–10.6)
CHLORIDE BLD-SCNC: 100 MMOL/L (ref 99–110)
CO2: 24 MMOL/L (ref 21–32)
CREAT SERPL-MCNC: 0.7 MG/DL (ref 0.6–1.1)
EKG ATRIAL RATE: 91 BPM
EKG DIAGNOSIS: NORMAL
EKG P AXIS: 15 DEGREES
EKG P-R INTERVAL: 134 MS
EKG Q-T INTERVAL: 346 MS
EKG QRS DURATION: 70 MS
EKG QTC CALCULATION (BAZETT): 425 MS
EKG R AXIS: 50 DEGREES
EKG T AXIS: 32 DEGREES
EKG VENTRICULAR RATE: 91 BPM
GFR AFRICAN AMERICAN: >60
GFR NON-AFRICAN AMERICAN: >60
GLOBULIN: 3.7 G/DL
GLUCOSE BLD-MCNC: 190 MG/DL (ref 70–99)
HCT VFR BLD CALC: 42.7 % (ref 36–48)
HEMOGLOBIN: 14.2 G/DL (ref 12–16)
MCH RBC QN AUTO: 31.7 PG (ref 26–34)
MCHC RBC AUTO-ENTMCNC: 33.3 G/DL (ref 31–36)
MCV RBC AUTO: 95.2 FL (ref 80–100)
PDW BLD-RTO: 13.5 % (ref 12.4–15.4)
PLATELET # BLD: 184 K/UL (ref 135–450)
PMV BLD AUTO: 8.8 FL (ref 5–10.5)
POTASSIUM REFLEX MAGNESIUM: 4.5 MMOL/L (ref 3.5–5.1)
RBC # BLD: 4.49 M/UL (ref 4–5.2)
SODIUM BLD-SCNC: 135 MMOL/L (ref 136–145)
TOTAL PROTEIN: 7 G/DL (ref 6.4–8.2)
WBC # BLD: 13.2 K/UL (ref 4–11)

## 2021-01-07 PROCEDURE — 97162 PT EVAL MOD COMPLEX 30 MIN: CPT

## 2021-01-07 PROCEDURE — 97110 THERAPEUTIC EXERCISES: CPT

## 2021-01-07 PROCEDURE — 2700000000 HC OXYGEN THERAPY PER DAY

## 2021-01-07 PROCEDURE — 36415 COLL VENOUS BLD VENIPUNCTURE: CPT

## 2021-01-07 PROCEDURE — 2500000003 HC RX 250 WO HCPCS: Performed by: INTERNAL MEDICINE

## 2021-01-07 PROCEDURE — 6360000002 HC RX W HCPCS: Performed by: INTERNAL MEDICINE

## 2021-01-07 PROCEDURE — 94669 MECHANICAL CHEST WALL OSCILL: CPT

## 2021-01-07 PROCEDURE — 97535 SELF CARE MNGMENT TRAINING: CPT

## 2021-01-07 PROCEDURE — 6370000000 HC RX 637 (ALT 250 FOR IP): Performed by: INTERNAL MEDICINE

## 2021-01-07 PROCEDURE — 85730 THROMBOPLASTIN TIME PARTIAL: CPT

## 2021-01-07 PROCEDURE — 94761 N-INVAS EAR/PLS OXIMETRY MLT: CPT

## 2021-01-07 PROCEDURE — 80053 COMPREHEN METABOLIC PANEL: CPT

## 2021-01-07 PROCEDURE — 2500000003 HC RX 250 WO HCPCS: Performed by: NURSE PRACTITIONER

## 2021-01-07 PROCEDURE — 2580000003 HC RX 258: Performed by: INTERNAL MEDICINE

## 2021-01-07 PROCEDURE — 85027 COMPLETE CBC AUTOMATED: CPT

## 2021-01-07 PROCEDURE — 97116 GAIT TRAINING THERAPY: CPT

## 2021-01-07 PROCEDURE — 97166 OT EVAL MOD COMPLEX 45 MIN: CPT

## 2021-01-07 PROCEDURE — 1200000000 HC SEMI PRIVATE

## 2021-01-07 RX ADMIN — REMDESIVIR 100 MG: 100 INJECTION, POWDER, LYOPHILIZED, FOR SOLUTION INTRAVENOUS at 17:24

## 2021-01-07 RX ADMIN — METHYLPREDNISOLONE SODIUM SUCCINATE 40 MG: 40 INJECTION, POWDER, FOR SOLUTION INTRAMUSCULAR; INTRAVENOUS at 14:24

## 2021-01-07 RX ADMIN — Medication 10 ML: at 08:24

## 2021-01-07 RX ADMIN — Medication 2000 UNITS: at 08:24

## 2021-01-07 RX ADMIN — METHYLPREDNISOLONE SODIUM SUCCINATE 40 MG: 40 INJECTION, POWDER, FOR SOLUTION INTRAMUSCULAR; INTRAVENOUS at 01:18

## 2021-01-07 RX ADMIN — HEPARIN SODIUM 9.7 ML/HR: 10000 INJECTION, SOLUTION INTRAVENOUS at 06:50

## 2021-01-07 ASSESSMENT — PAIN DESCRIPTION - ORIENTATION: ORIENTATION: RIGHT;LEFT

## 2021-01-07 ASSESSMENT — PAIN DESCRIPTION - PAIN TYPE: TYPE: ACUTE PAIN

## 2021-01-07 NOTE — PROGRESS NOTES
and oriented  Skin: Deferred. Assessment/Plan:    Active Hospital Problems    Diagnosis    Pneumonia due to COVID-19 virus [U07.1, J12.82]    Pulmonary infiltrates [R91.8]    Hypoxemia [R09.02]    Hyponatremia [E87.1]    Down's syndrome [Q90.9]     Acute Respiratory Failure with Hypoxia 2/2 COVID-19 pneumonia:  COVID-19 Pneumonia with Acute Hypoxic Respiratory Failure:  Symptom onset: Unclear  Diagnosis: 12/28/20  Admission: 1/3/21  Treatments:    - Steroids: Solumedrol 40 mg q12   - Remdesivir completed 1/7   - Convalescent Plasma given 1/3  Oxygen Requirement: Improving. 1 L by NC (went down to 89% on RA today). Add Pulmonary toileting with IS and Acapella. DVT Prophylaxis: Heparin drip  Isolation: 20 days    Acute Pulmonary Embolism: Heparin drip    Hypovolemic hyponatremia:  Volume expansion given    Down Syndrome:  Lives with family. Relatively high functioning at baseline.       DVT Prophylaxis: Heparin drip  Diet: DIET GENERAL;  Code Status: Full Code  PT/OT Eval Status: Ambulatory     Dispo - Likely home in 1-2 days once stable off Dannielle Rogers MD

## 2021-01-07 NOTE — ACP (ADVANCE CARE PLANNING)
Advance Care Planning     General Advance Care Planning (ACP) Conversation    Date of Conversation: 1/3/2021  Conducted with: Patient with Decision Making Capacity   Healthcare Decision Maker: Court-Appointed Legal Guardian (name) pt mother    Healthcare Decision Maker:          Content/Action Overview:  DECLINED ACP Conversation - will revisit periodically  Reviewed DNR/DNI and patient elects Full Code (Attempt Resuscitation)  ventilation preferences  Family would like pt to be ventilated should it be necessary. Length of Voluntary ACP Conversation in minutes:  <16 minutes (Non-Billable)    Afshan Taveras CM spoke to pt sister, Alma Hernandez, who added both pt mother and father to phone conversation.   Morenita Henry RN

## 2021-01-07 NOTE — CARE COORDINATION
Chart review day 4:  Pt followed by IM and on 2.5 L O2.  PT/OT ordered. Per RN, pt is a SB to bedside commode and plan is for pt to go home w/family when ready for d/c in 1-2 days. CM will continue to follow for needs, including possible home O2 and PT/OT recs.   Leta Seip, RN

## 2021-01-07 NOTE — PROGRESS NOTES
Physical Therapy    Facility/Department: [facility] C5 - MED SURG/ORTHO  Initial Assessment    NAME: Hayden Oglesby  : 1975  MRN: [id number]    Date of Service: 2021    Discharge Recommendations:  Home with Home health PT, 24 hour supervision or assist   PT Equipment Recommendations  Equipment Needed: No    Assessment    Body structures, Functions, Activity limitations: Decreased functional mobility ; Decreased endurance;Decreased strength;Decreased balance  Assessment: Patient seen for PT evaluation, gait, and LE strengthening. Patient cleared by RN for therapy participation this date. Patient agreeable to therapy. Patient admitted for pneumonia d/t COVID-19. Pt previously independent with mobility. Patient completed mobility with SBA-supervision. Unable to assess stairs d/t isolation precautions; however, pt able to complete standing exercises with rest breaks. P.T .will continue to follow throughout LOS. Recommending DC to home with 24/7 supervision and home PT to progress endurance. Treatment Diagnosis: decreased endurance  Specific instructions for Next Treatment: progress gait and endurance  Prognosis: Excellent  Decision Making: Medium Complexity  PT Education: Goals;Transfer Training;Disease Specific Education;PT Role;Energy Conservation; Functional Mobility Training;Plan of Care;General Safety;Orientation;Home Exercise Program;Precautions;Gait Training  Patient Education: pt educated on benefits of OOB mobility and LE HEP  Barriers to Learning: cognition  REQUIRES PT FOLLOW UP: Yes  Activity Tolerance  Activity Tolerance: Patient limited by endurance; Patient limited by fatigue  Activity Tolerance: SPO2 >90% throughout session on 1 L O2       Patient Diagnosis(es): The primary encounter diagnosis was Pneumonia due to COVID-19 virus. Diagnoses of Hypoxia, Generalized weakness, and Decreased oral intake were also pertinent to this visit.      has a past medical history of COVID-19, Down syndrome, and Cognitive Status: Exceptions  Arousal/Alertness: Appropriate responses to stimuli  Following Commands: Follows one step commands with increased time  Problem Solving: Assistance required to correct errors made  Sequencing: Requires cues for some    Objective     Observation/Palpation  Posture: Good    AROM RLE (degrees)  RLE AROM: WFL  AROM LLE (degrees)  LLE AROM : WFL  Strength RLE  Strength RLE: WFL  Strength LLE  Strength LLE: WFL     Sensation  Overall Sensation Status: WFL(reports N/T in BLE)  Bed mobility  Supine to Sit: Stand by assistance(HOB elevated)  Sit to Supine: Unable to assess(up in chair at end of session)  Transfers  Sit to Stand: Stand by assistance  Stand to sit: Stand by assistance  Ambulation  Ambulation?: Yes  Ambulation 1  Surface: level tile  Device: No Device  Assistance: Stand by assistance;Supervision(SBA-Supervision)  Gait Deviations: Slow Airam;Decreased step length;Decreased step height  Distance: 1x 40 ft in room  Stairs/Curb  Stairs?: No        Exercises  Hip Flexion: 1x 5 BLE standing marches in place, 1x 10 BLE seated marches  Knee Long Arc Quad: 1x 10 BLE  Ankle Pumps: 1x 10 BLE seated heel raises     Plan   Plan  Times per week: 2-3  Plan weeks: 1/12/20  Specific instructions for Next Treatment: progress gait and endurance  Current Treatment Recommendations: Strengthening, Transfer Training, Endurance Training, Patient/Caregiver Education & Training, Balance Training, Gait Training, Home Exercise Program, Functional Mobility Training, Stair training, Safety Education & Training  Safety Devices  Type of devices:  All fall risk precautions in place, Patient at risk for falls, Call light within reach, Left in chair, Chair alarm in place, Gait belt, Nurse notified  Restraints  Initially in place: No    AM-PAC Score  AM-PAC Inpatient Mobility Raw Score : 19 (01/07/21 1530)  AM-PAC Inpatient T-Scale Score : 45.44 (01/07/21 1530)  Mobility Inpatient CMS 0-100% Score: 41.77 (01/07/21 1530)  Mobility Inpatient CMS G-Code Modifier : CK (01/07/21 1530)          Goals  Short term goals  Time Frame for Short term goals: 1/12/20  Short term goal 1: Pt will complete bed mobility independently. Short term goal 2: Pt will complete transfers independently. Short term goal 3: Pt will ambulate 150 ft independently. Short term goal 4: Pt will tolerate 12-15 reps of LE exercises for strengthening and endurance by 1/9/20. Patient Goals   Patient goals : \"go home\"       Therapy Time   Individual Concurrent Group Co-treatment   Time In 7841         Time Out 1510         Minutes 33         Timed Code Treatment Minutes: 23 Minutes     If pt is unable to be seen after this session, please let this note serve as discharge summary. Please see case management note for discharge disposition. Thank you.     Yanick Bustamante, PT

## 2021-01-07 NOTE — PROGRESS NOTES
Occupational Therapy   Occupational Therapy Initial Assessment and Treatment Note  Date: 2021   Patient Name: Michael Richmond  MRN: [id number]     : 1975    Date of Service: 2021    Discharge Recommendations:  24 hour supervision or assist     Assessment   Performance deficits / Impairments: Decreased functional mobility ; Decreased ADL status; Decreased endurance;Decreased balance  Assessment: OT eval completed. Pt admitted for pneumonia d/t COVID. Prior to onset, pt lived with parents and states that she was Ind with ambulation. Sister helps with bathing and dressing. During OT session, pt was SBA/CGA for standing balance and functional transfers. Demo's fatigue with activity but is motivated to participate in activity. Cont skilled OT in acute care. Recommend 24/7 support at home. Prognosis: Good  Decision Making: Medium Complexity  OT Education: OT Role;Plan of Care;Transfer Training;ADL Adaptive Strategies  Patient Education: disease specific ed:  role of OT, safe transfer tech, energy conservation during ADLs  REQUIRES OT FOLLOW UP: Yes  Activity Tolerance  Activity Tolerance: Patient Tolerated treatment well  Activity Tolerance: 3LO2, O2 sats 95%  Safety Devices  Safety Devices in place: Yes  Type of devices: Gait belt;Call light within reach; Bed alarm in place; Left in bed;Nurse notified         Patient Diagnosis(es): The primary encounter diagnosis was Pneumonia due to COVID-19 virus. Diagnoses of Hypoxia, Generalized weakness, and Decreased oral intake were also pertinent to this visit. has a past medical history of COVID-19, Down syndrome, and Influenza A.   has a past surgical history that includes Foot surgery (11).            Restrictions  Restrictions/Precautions  Restrictions/Precautions: Fall Risk, Isolation  Position Activity Restriction  Other position/activity restrictions: 3LO2  Subjective   General  Chart Reviewed: Yes  Patient assessed for rehabilitation services?: Yes  Family / Caregiver Present: No  Referring Practitioner: ELDER Valdez  Diagnosis: pna d/t COVID  Subjective  Subjective: c/o LE weakness  General Comment  Comments: RN approved therapy  Patient Currently in Pain: Yes  Pain Assessment  Pain Assessment: 0-10  Santiago-Baker Pain Rating: Hurts a little bit  Pain Location: Leg  Pain Orientation: Right;Left  Vital Signs  Temp: 98.4 °F (36.9 °C)  Temp Source: Oral  Pulse: 83  Resp: 18  BP: 116/82  MAP (mmHg): 93  Level of Consciousness: Alert (0)  MEWS Score: 1  Patient Currently in Pain: Yes  Oxygen Therapy  SpO2: 98 %  O2 Device: Nasal cannula  O2 Flow Rate (L/min): 2 L/min  Social/Functional History  Social/Functional History  Lives With: Family(Lives with parents. Sister lives next door.)  Type of Home: House  Home Layout: Two level, Performs ADL's on one level, Laundry in basement  Home Access: Stairs to enter without rails  Entrance Stairs - Number of Steps: 1  Bathroom Shower/Tub: Tub/Shower unit  Bathroom Toilet: Standard  Bathroom Equipment: Shower chair  Home Equipment: Rolling walker(belongs to her mom)  ADL Assistance: Needs assistance(Reports that her sister helps her get bathed and dressed)  Homemaking Assistance: Needs assistance(Pt reports that she helps with cleaning and dishes.)  Ambulation Assistance: Independent  Transfer Assistance: Independent  Occupation: Part time employment  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   No falls per pt report     Objective   Vision: Within Functional Limits  Hearing: Within functional limits    Orientation  Overall Orientation Status: Within Functional Limits     Balance  Sitting Balance: Supervision  Standing Balance: Contact guard assistance(SBA for static, CGA for dynamic, no device)  Standing Balance  Time: >5 min at sink  Functional Mobility  Functional - Mobility Device: No device  Activity: To/from bathroom  Assist Level: Contact guard assistance  Toilet Transfers  Toilet - Technique: Ambulating(no device)  Equipment Used: Grab bars  Toilet Transfer: Contact guard assistance;Stand by assistance  ADL  Feeding: Independent  Grooming: Stand by assistance; Increased time to complete(wash hands, comb hair at sink and brush teeth)  LE Dressing: Minimal assistance(start brief over feet (limited reach to feet))  Toileting: Contact guard assistance(CGA for standing balance as she pulled up brief; pt managed pericare with SBA)  Tone RUE  RUE Tone: Normotonic  Tone LUE  LUE Tone: Normotonic  Coordination  Movements Are Fluid And Coordinated: Yes     Bed mobility  Supine to Sit: Stand by assistance(HOB elevated)  Sit to Supine: Stand by assistance  Transfers  Stand Pivot Transfers: Contact guard assistance  Sit to stand: Stand by assistance  Stand to sit: Contact guard assistance     Cognition  Overall Cognitive Status: Exceptions  Arousal/Alertness: Appropriate responses to stimuli  Following Commands:  Follows one step commands with increased time  Problem Solving: Assistance required to correct errors made  Sequencing: Requires cues for some     LUE AROM (degrees)  LUE AROM : WFL  RUE AROM (degrees)  RUE AROM : WFL  LUE Strength  Gross LUE Strength: WFL  RUE Strength  Gross RUE Strength: WFL       Plan   Plan  Times per week: 2-3x  Current Treatment Recommendations: Self-Care / ADL, Endurance Training, Balance Training, Functional Mobility Training, Safety Education & Training, Equipment Evaluation, Education, & procurement  AM-PAC Score   AM-[facility] Inpatient Daily Activity Raw Score: 19 (01/07/21 1200)  AM-PAC Inpatient ADL T-Scale Score : 40.22 (01/07/21 1200)  ADL Inpatient CMS 0-100% Score: 42.8 (01/07/21 1200)  ADL Inpatient CMS G-Code Modifier : CK (01/07/21 1200)    Goals  Short term goals  Time Frame for Short term goals: 1 week (1/14) unless noted  Short term goal 1: Perform functional transfers with Supervision  Short term goal 2: Perform LE dressing with SBA  Short term goal 3: Perform toileting hygiene and clothing with SBA by 1/11  Short term goal 4: Perform UE exer 15x each to improve endurance  Patient Goals   Patient goals : \"Be able to walk\"     Therapy Time   Individual Concurrent Group Co-treatment   Time In 1013         Time Out 1050         Minutes 37         Timed Code Treatment Minutes: 27 Minutes(10 min eval)    If pt is discharged prior to next OT session, this note will serve as the discharge summary.   July Raygoza OT

## 2021-01-08 VITALS
SYSTOLIC BLOOD PRESSURE: 139 MMHG | TEMPERATURE: 97.9 F | OXYGEN SATURATION: 93 % | HEIGHT: 59 IN | HEART RATE: 96 BPM | RESPIRATION RATE: 16 BRPM | WEIGHT: 150 LBS | BODY MASS INDEX: 30.24 KG/M2 | DIASTOLIC BLOOD PRESSURE: 85 MMHG

## 2021-01-08 LAB
A/G RATIO: 1.2 (ref 1.1–2.2)
ALBUMIN SERPL-MCNC: 3.8 G/DL (ref 3.4–5)
ALP BLD-CCNC: 91 U/L (ref 40–129)
ALT SERPL-CCNC: 86 U/L (ref 10–40)
ANION GAP SERPL CALCULATED.3IONS-SCNC: 11 MMOL/L (ref 3–16)
APTT: 29.7 SEC (ref 24.2–36.2)
APTT: 86.2 SEC (ref 24.2–36.2)
AST SERPL-CCNC: 26 U/L (ref 15–37)
BILIRUB SERPL-MCNC: 0.3 MG/DL (ref 0–1)
BUN BLDV-MCNC: 22 MG/DL (ref 7–20)
CALCIUM SERPL-MCNC: 8.9 MG/DL (ref 8.3–10.6)
CHLORIDE BLD-SCNC: 100 MMOL/L (ref 99–110)
CO2: 24 MMOL/L (ref 21–32)
CREAT SERPL-MCNC: 0.6 MG/DL (ref 0.6–1.1)
GFR AFRICAN AMERICAN: >60
GFR NON-AFRICAN AMERICAN: >60
GLOBULIN: 3.2 G/DL
GLUCOSE BLD-MCNC: 191 MG/DL (ref 70–99)
POTASSIUM REFLEX MAGNESIUM: 4.6 MMOL/L (ref 3.5–5.1)
SODIUM BLD-SCNC: 135 MMOL/L (ref 136–145)
TOTAL PROTEIN: 7 G/DL (ref 6.4–8.2)

## 2021-01-08 PROCEDURE — 97116 GAIT TRAINING THERAPY: CPT

## 2021-01-08 PROCEDURE — 2580000003 HC RX 258: Performed by: INTERNAL MEDICINE

## 2021-01-08 PROCEDURE — 6370000000 HC RX 637 (ALT 250 FOR IP): Performed by: INTERNAL MEDICINE

## 2021-01-08 PROCEDURE — 97535 SELF CARE MNGMENT TRAINING: CPT

## 2021-01-08 PROCEDURE — 97110 THERAPEUTIC EXERCISES: CPT

## 2021-01-08 PROCEDURE — 85730 THROMBOPLASTIN TIME PARTIAL: CPT

## 2021-01-08 PROCEDURE — 36415 COLL VENOUS BLD VENIPUNCTURE: CPT

## 2021-01-08 PROCEDURE — 80053 COMPREHEN METABOLIC PANEL: CPT

## 2021-01-08 PROCEDURE — 6360000002 HC RX W HCPCS: Performed by: INTERNAL MEDICINE

## 2021-01-08 RX ADMIN — METHYLPREDNISOLONE SODIUM SUCCINATE 40 MG: 40 INJECTION, POWDER, FOR SOLUTION INTRAMUSCULAR; INTRAVENOUS at 01:05

## 2021-01-08 RX ADMIN — Medication 10 ML: at 08:23

## 2021-01-08 RX ADMIN — Medication 2000 UNITS: at 08:23

## 2021-01-08 RX ADMIN — METHYLPREDNISOLONE SODIUM SUCCINATE 40 MG: 40 INJECTION, POWDER, FOR SOLUTION INTRAMUSCULAR; INTRAVENOUS at 11:58

## 2021-01-08 RX ADMIN — APIXABAN 10 MG: 5 TABLET, FILM COATED ORAL at 14:21

## 2021-01-08 ASSESSMENT — PAIN SCALES - GENERAL: PAINLEVEL_OUTOF10: 0

## 2021-01-08 NOTE — CARE COORDINATION
CASE MANAGEMENT DISCHARGE SUMMARY      Discharge to: home w/Critical access hospital    Transportation: private     Confirmed discharge plan with:     Patient: yes, RN stated she would contact family   RN, name: Andrews Kennedy RN    Note: [facility] aware of d/c order.   Angela Parnell RN

## 2021-01-08 NOTE — PROGRESS NOTES
Perfect serve Dr. Wade Mcneil:    Patient has been on RA and tolerating sitting/ ambulating in the room without oxygen SPO2 90-91%. I was told in huddle the plan is to discharge patient. Do you want to change patient to oral anticoagulation or remain on heparin gtt at this time if patient is not going to discharge today? Awaiting response.

## 2021-01-08 NOTE — CARE COORDINATION
[facility]    DC order noted, all docs needed have been faxed to [facility] for home care services.     Home care to see patient within 24-48 hrs    Socorro Brooke RN, BSN CTN  [facility] [phone]

## 2021-01-08 NOTE — PROGRESS NOTES
COVID-19, Down syndrome, and Influenza A.   has a past surgical history that includes Foot surgery (5/26/11). Restrictions  Restrictions/Precautions  Restrictions/Precautions: Fall Risk, Isolation  Position Activity Restriction  Other position/activity restrictions: 1LO2  Subjective   General  Response To Previous Treatment: Patient with no complaints from previous session. Family / Caregiver Present: No  Referring Practitioner: Dejon Alvarado  Subjective  Subjective: pt in chair, agreeable to therapy  Pain Screening  Patient Currently in Pain: Denies  Vital Signs  Patient Currently in Pain: Denies       Orientation  Orientation  Overall Orientation Status: Impaired  Orientation Level: Disoriented to time     Objective   Bed mobility  Supine to Sit: Unable to assess  Sit to Supine: Unable to assess  Comment: up in chair at beginning and end of session  Transfers  Sit to Stand: Supervision(with RW)  Stand to sit: Supervision(with Rw)  Ambulation  Ambulation?: Yes  Ambulation 1  Surface: level tile  Device: Rolling Walker  Assistance: Stand by assistance  Gait Deviations: Slow Airam;Decreased step length;Decreased step height  Distance: 2x 50 ft  Stairs/Curb  Stairs?: No        Exercises  Hamstring Sets: 1x 10 BLE standing hamstring curls in place at  with supervision  Hip Flexion: 1x 10 BLE standing marches in place at [address] [place] [address] with supervision  Hip Extension/Leg Presses: 1x 10 mini squats at RW with SBA  Ankle Pumps: 1x 10 standing heel raises at RW with SBA                     AM-PAC Score  AM-PAC Inpatient Mobility Raw Score : 19 (01/08/21 1200)  AM-PAC Inpatient T-Scale Score : 45.44 (01/08/21 1200)  Mobility Inpatient CMS 0-100% Score: 41.77 (01/08/21 1200)  Mobility Inpatient CMS G-Code Modifier : CK (01/08/21 1200)          Goals  Short term goals  Time Frame for Short term goals: 1/12/20  Short term goal 1: Pt will complete bed mobility independently.   Short term goal 2: Pt will complete transfers independently. Short term goal 3: Pt will ambulate 150 ft independently. Short term goal 4: Pt will tolerate 12-15 reps of LE exercises for strengthening and endurance by 1/9/20. Patient Goals   Patient goals : \"go home\"    Plan    Plan  Times per week: 2-3  Plan weeks: 1/12/20  Specific instructions for Next Treatment: progress gait and endurance  Current Treatment Recommendations: Strengthening, Transfer Training, Endurance Training, Patient/Caregiver Education & Training, Balance Training, Gait Training, Home Exercise Program, Functional Mobility Training, Stair training, Safety Education & Training  Safety Devices  Type of devices: All fall risk precautions in place, Patient at risk for falls, Call light within reach, Left in chair, Chair alarm in place, Gait belt, Nurse notified  Restraints  Initially in place: No     Therapy Time   Individual Concurrent Group Co-treatment   Time In 0955         Time Out 1020         Minutes 25         Timed Code Treatment Minutes: 25 Minutes     If pt is unable to be seen after this session, please let this note serve as discharge summary. Please see case management note for discharge disposition. Thank you.     Deonna Valerio, PT

## 2021-01-08 NOTE — CARE COORDINATION
CM spoke to family on 01/07 and updated them regarding pt care and ACP. Pt in on room air at this time. PT/OT recommends [facility] and [facility] following. Pt still on heparin drip for acute pulmonary embolism. Family will be able to transport pt home when medically stable for d/c.   Jose De Jesus Smith RN

## 2021-01-08 NOTE — CARE COORDINATION
[facility]  CM Spoke with patient regarding homecare services. Demographics verified, and patient is agreeable to home care services. Will continue to follow patient for needs. I will fax all docs on DC to [facility]. ... However if patient DC's over the weekend [facility] [address], [place], and H&P to [phone]. Home care to see patient by: 24-48 hr post hosp DC.       Татьяна Moise RN, BSN CTN  [facility] [phone]

## 2021-01-08 NOTE — PROGRESS NOTES
Occupational Therapy  Facility/Department: [facility] C5 - MED SURG/ORTHO  Daily Treatment Note  NAME: Cecelia Muñoz  : 1975  MRN: [id number]    Date of Service: 2021    Discharge Recommendations:  24 hour supervision or assist  OT Equipment Recommendations  Equipment Needed: No    Assessment   Performance deficits / Impairments: Decreased functional mobility ; Decreased ADL status; Decreased endurance;Decreased balance;Decreased cognition  Assessment: Pt continues to be agreeable to treatment, with improving level of assistance for functional mobility/transfers this date, especially with use of walker. PT aware and may trial RW later this date. Pt progressing well toward established goals, and continue to recommend pt return home with 24/ supervision/assist.  OT Education: OT Role;Plan of Care;Transfer Training;ADL Adaptive Strategies; Equipment; Energy Conservation;Orientation;Precautions  Patient Education: importance of mobility  REQUIRES OT FOLLOW UP: Yes  Activity Tolerance  Activity Tolerance: Patient Tolerated treatment well  Activity Tolerance: Pt SpO2 94% on RA during session  Safety Devices  Safety Devices in place: Yes  Type of devices: Gait belt;Call light within reach;Nurse notified; Left in chair;Chair alarm in place         Patient Diagnosis(es): The primary encounter diagnosis was Pneumonia due to COVID-19 virus. Diagnoses of Hypoxia, Generalized weakness, and Decreased oral intake were also pertinent to this visit. has a past medical history of COVID-19, Down syndrome, and Influenza A.   has a past surgical history that includes Foot surgery (11).     Restrictions  Restrictions/Precautions  Restrictions/Precautions: Fall Risk, Isolation  Position Activity Restriction  Other position/activity restrictions: 1LO2  Subjective   General  Chart Reviewed: Yes  Patient assessed for rehabilitation services?: Yes  Family / Caregiver Present: No  Referring Practitioner: ELDER Jones  Diagnosis: pna d/t COVID  Subjective  Subjective: Pt in bed on arrival, just finished breakfast, pleasant and agreeable to treatment, requesting to use restroom  General Comment  Comments: Per RN okay to treat  Vital Signs  Patient Currently in Pain: Denies   Orientation  Orientation  Overall Orientation Status: Within Functional Limits  Objective    ADL  Feeding: Independent  Grooming: Increased time to complete;Supervision(in stance at sink)  LE Dressing: Minimal assistance(for socks, to change brief)  Toileting: Maximum assistance(assist for clothing management upon sitting, assist for hygiene; Pt given encouragement to attempt tasks without assistance)        Balance  Sitting Balance: Modified independent   Standing Balance: Supervision  Standing Balance  Time: >5 minutes, 1-2 minutes, 2-3 minutes over multiple trials  Activity: mobility, standing ADL  Functional Mobility  Functional - Mobility Device: Standard Walker  Activity: Other; To/from bathroom  Assist Level: Supervision(SBA progressed to Supervision)  Functional Mobility Comments: CGA-SBA without device into bathroom, use of SW exiting bathroom d/t pt attempting to reach out for items to stabilize on; Pt stating use of the walker \"was a good idea\" - PT aware  Toilet Transfers  Toilet - Technique: Ambulating  Equipment Used: Grab bars  Toilet Transfer: Supervision;Stand by assistance  Bed mobility  Supine to Sit: Supervision(HOB elevated)  Sit to Supine: Unable to assess(pt left up in chair)  Transfers  Sit to stand: Stand by assistance;Supervision  Stand to sit: Supervision        Coordination  Fine Motor: Appropriate for ADL tasks        Cognition  Overall Cognitive Status: Exceptions(pleasant, cooperative)  Following Commands:  Follows one step commands with increased time  Attention Span: Attends with cues to redirect  Memory: Decreased recall of recent events  Problem Solving: Assistance required to generate solutions  Initiation: Requires cues for some  Sequencing: Requires cues for some         Plan   Plan  Times per week: 2-3x  Current Treatment Recommendations: Self-Care / ADL, Endurance Training, Balance Training, Functional Mobility Training, Safety Education & Training, Equipment Evaluation, Education, & procurement, Strengthening, Gait Training, Patient/Caregiver Education & Training    AM-PAC Score        AM-PAC Inpatient Daily Activity Raw Score: 18 (01/08/21 1027)  AM-PAC Inpatient ADL T-Scale Score : 38.66 (01/08/21 1027)  ADL Inpatient CMS 0-100% Score: 46.65 (01/08/21 1027)  ADL Inpatient CMS G-Code Modifier : CK (01/08/21 1027)    Goals  Short term goals  Time Frame for Short term goals: 1 week (1/14) unless noted  Short term goal 1: Perform functional transfers with Supervision - progressing, continue for consistency  Short term goal 2: Perform LE dressing with SBA  Short term goal 3: Perform toileting hygiene and clothing with SBA by 1/11  Short term goal 4: Perform UE exer 15x each to improve endurance  Patient Goals   Patient goals : \"Be able to walk\"       Therapy Time   Individual Concurrent Group Co-treatment   Time In 0905         Time Out 0930         Minutes 25         Timed Code Treatment Minutes: 25 Minutes     This note to serve as discharge summary should pt discharge prior to next session.     MAGEN Lundy/L

## 2021-01-08 NOTE — DISCHARGE SUMMARY
Hospital Medicine Discharge Summary    Patient: Mercy Kay     Age: 39 y.o. Gender: female  : 1975   MRN: [id number]  Code Status: Full     Admit Date: 1/3/2021   Discharge Date: 2021    Disposition:  Home     Condition at Discharge: Stable    Primary Care Provider: Janene Nichols MD    Admitting Physician: Jimmy Estrada MD  Discharge Physician: Gwen Devine MD       Discharge Diagnoses: Active Hospital Problems    Diagnosis    Pneumonia due to COVID-19 virus [U07.1, J12.82]    Pulmonary infiltrates [R91.8]    Hypoxemia [R09.02]    Hyponatremia [E87.1]    Down's syndrome [Q90.9]       Hospital Course: The patient is a 39 y.o. female who presents with COVID-19 and hypoxia. Patient has Down Syndrome. She lives with family who have Ministerio. Father with COVID. She tested positive last Monday (). Fevers, decreased oral intake, hypoxic at 87% on room air on arrival.      Assessment/Plan:    Acute Respiratory Failure with Hypoxia 2/2 COVID-19 pneumonia:  COVID-19 Pneumonia with Acute Hypoxic Respiratory Failure:  Symptom onset: Unclear  Diagnosis: 20  Admission: 1/3/21  Treatments:    - Steroids: Solumedrol 40 mg q12   - Remdesivir completed    - Convalescent Plasma given 1/3  Oxygen Requirement: Improved  DVT Prophylaxis: Heparin drip  Isolation: 20 days    Acute Pulmonary Embolism: Heparin drip. Changed to Xarelto at discharge. Recommend 6 months of anticoagulation. Hypovolemic hyponatremia:  Volume expansion given    Down Syndrome:  Lives with family. Relatively high functioning at baseline.             Exam:   /85   Pulse 96   Temp 97.9 °F (36.6 °C) (Oral)   Resp 16   Ht 4' 11\" (1.499 m)   Wt 150 lb (68 kg)   SpO2 93%   BMI 30.30 kg/m²   I performed an audiovisual assessment, based on new provisions and guidance offered by [facility] on 2020 in setting of COVID-19 outbreak and in order to preserve personal protective equipment in accordance with the flexibilities announced by CMS on March 30, 2020. References:   https://med. [place].gov/Portals/0/Resources/COVID-19/3_18%20Telemed%20Guidance%20Updated%20March%2018. pdf?pcv=2245-97-73-408954-485   http://[employer]/. pdf     Bedside physical examination deferred. However, I did visually inspect the patient and observed the following:     General appearance: No apparent distress, appears stated age and cooperative. Neck: Deferred. Respiratory:  Normal respiratory effort. Cardiovascular: Deferred. Abdomen: Deferred. Musculoskelatal: Deferred. Neurologic:  Deferred. Psychiatric: Alert and oriented  Skin: Deferred. Patient Discharge Instructions: Follow up:  1. Primary Care Provider Terell Alvarez MD in the next 1-2 weeks. Discharge Medications:   Discharge Medication List as of 1/8/2021  4:23 PM      START taking these medications    Details   rivaroxaban 15 & 20 MG Starter Pack Take 15 mg BID x 14 more days, then 20 mg daily, Disp-1 Package, R-0Normal           Discharge Medication List as of 1/8/2021  4:23 PM        Discharge Medication List as of 1/8/2021  4:23 PM      CONTINUE these medications which have NOT CHANGED    Details   !! diclofenac sodium (VOLTAREN) 1 % GEL Apply 2 g topically 2 times daily, Topical, 2 TIMES DAILY Starting 9/13/2017, Until Discontinued, Disp-1 Tube, R-3, Normal      !! diclofenac sodium (VOLTAREN) 1 % GEL Apply 4 g topically 4 times daily, Topical, 4 TIMES DAILY Starting 5/13/2016, Until Discontinued, Disp-5 Tube, R-0, Normal      MedroxyPROGESTERone Acetate (DEPO-PROVERA IM) Inject  into the muscle See Admin Instructions. Every three months       Calcium Citrate-Vitamin D 200-200 MG-UNIT TABS Take  by mouth daily. !! - Potential duplicate medications found. Please discuss with provider.         Discharge Medication List as of 1/8/2021  4:23 PM Significant Test Results    No results found. Consults:     IP CONSULT TO HOSPITALIST  IP CONSULT TO PULMONOLOGY    Labs: For convenience and continuity at follow-up the following most recent labs are provided:    Lab Results   Component Value Date    WBC 13.2 01/07/2021    HGB 14.2 01/07/2021    HCT 42.7 01/07/2021    MCV 95.2 01/07/2021     01/07/2021     01/08/2021    K 4.6 01/08/2021     01/08/2021    CO2 24 01/08/2021    BUN 22 01/08/2021    CREATININE 0.6 01/08/2021    CALCIUM 8.9 01/08/2021    TROPONINI <0.01 01/03/2021    ALKPHOS 91 01/08/2021    ALT 86 01/08/2021    AST 26 01/08/2021    BILITOT 0.3 01/08/2021    LABALBU 3.8 01/08/2021    LDLCALC 192 02/05/2021    TRIG 96 02/05/2021     No results found for: INR      The patient was seen and examined on day of discharge and this discharge summary is in conjunction with any daily progress note from day of discharge. Time spent on discharge is more than 30 minutes in the examination, evaluation, counseling and review of medications and discharge plan. Signed:    Barbara Barker MD   2/7/2021    Thank you Erik Eastman MD for the opportunity to be involved in this patient's care. If you have any questions or concerns please feel free to contact my office [phone].

## 2021-01-08 NOTE — DISCHARGE INSTR - COC
Rapid influenza A/B antigens   20             Nurse Assessment:  Last Vital Signs: /76   Pulse 91   Temp 98.1 °F (36.7 °C) (Oral)   Resp 18   Ht 4' 11\" (1.499 m)   Wt 150 lb (68 kg)   SpO2 93%   BMI 30.30 kg/m²     Last documented pain score (0-10 scale): Pain Level: 0  Last Weight:   Wt Readings from Last 1 Encounters:   21 150 lb (68 kg)     Mental Status:  oriented and alert    IV Access:  - None    Nursing Mobility/ADLs:  Walking   Independent  Transfer  Independent  Bathing  Assisted  Dressing  Assisted  Toileting  Independent  Feeding  Independent  Med Admin  Assisted  Med Delivery   whole    Wound Care Documentation and Therapy:  Incision 11 Foot Right (Active)   Number of days: 9429        Elimination:  Continence:   · Bowel: Yes  · Bladder: Yes  Urinary Catheter: None   Colostomy/Ileostomy/Ileal Conduit: No       Date of Last BM: 21  No intake or output data in the 24 hours ending 21 0907  No intake/output data recorded. Safety Concerns: At Risk for Falls    Impairments/Disabilities:      None    Nutrition Therapy:  Current Nutrition Therapy:   - Oral Diet:  General    Routes of Feeding: Oral  Liquids: Thin Liquids  Daily Fluid Restriction: no  Last Modified Barium Swallow with Video (Video Swallowing Test): not done    Treatments at the Time of Hospital Discharge:   Respiratory Treatments:RA  Oxygen Therapy:  is not on home oxygen therapy.   Ventilator:    - No ventilator support    Rehab Therapies: Nurse, Physical Therapy and Occupational Therapy  Weight Bearing Status/Restrictions: No weight bearing restirctions  Other Medical Equipment (for information only, NOT a DME order):  walker  Other Treatments: HOME HEALTH CARE: LEVEL 3 [address] to establish plan of care for patient over 60 day period   Nursing  Initial home SN evaluation visit to occur within 24-48 hours for:  1)  medication management  2)  VS and clinical assessment  3) S&S chronic disease exacerbation education + when to contact MD/NP  4)  care coordination  Medication Reconciliation during 1st SN visit  PT/OT/Speech   Evaluations in home within 24-48 hours of discharge to include DME and home safety   Frontload therapy 5 days, then 3x a week   OT to evaluate if patient has [address] needs for personal care    evaluation within 24-48 hours to evaluate resources & insurance for potential [place], [place], LTC, and Medicaid options   Palliative Care referral within 5 days of hospital discharge   PCP Visit scheduled within 3 - 7 days of hospital discharge [facility] [address] (If patient is agreeable and meets guidelines)      Patient's personal belongings (please select all that are sent with patient):  Glasses cell phone clothes    RN SIGNATURE:  Electronically signed by Nusrat Reeves RN on 1/8/21 at 4:23 PM EST    CASE MANAGEMENT/SOCIAL WORK SECTION    Inpatient Status Date: ***    Readmission Risk Assessment Score:  Readmission Risk              Risk of Unplanned Readmission:        12           Discharging to Facility/ [address]   [address] [address] [address] [place] [address]   [phone]     / signature: {Esignature:[id number]}    PHYSICIAN SECTION    Prognosis: Good    Condition at Discharge: Stable    Rehab Potential (if transferring to Rehab): Good    Recommended Labs or Other Treatments After Discharge:   Home Care    Physician Certification: I certify the above information and transfer of Brewer Mis  is necessary for the continuing treatment of the diagnosis listed and that she requires Home Care for greater 30 days.      Update Admission H&P: No change in H&P    PHYSICIAN SIGNATURE:  Electronically signed by Juan Carlos Crespo MD on 1/8/21 at 4:06 PM EST

## 2021-01-08 NOTE — HOME CARE
Teryl Graft will require the following home care treatments or therapies: Skilled Nursing, vital signs, medication compliance and education, PT/OT, wound care, teaching and management of medical conditions, etc.. Home care will be necessary because of COVID-19. The patient is in agreement to receiving home care.

## 2021-01-09 ENCOUNTER — CARE COORDINATION (OUTPATIENT)
Dept: CASE MANAGEMENT | Age: 46
End: 2021-01-09

## 2021-01-09 DIAGNOSIS — U07.1 PNEUMONIA DUE TO COVID-19 VIRUS: Primary | ICD-10-CM

## 2021-01-09 DIAGNOSIS — J12.82 PNEUMONIA DUE TO COVID-19 VIRUS: Primary | ICD-10-CM

## 2021-01-09 PROCEDURE — 1111F DSCHRG MED/CURRENT MED MERGE: CPT | Performed by: FAMILY MEDICINE

## 2021-01-09 NOTE — CARE COORDINATION
[facility] Transitions Initial Follow Up Call    Call within 2 business days of discharge: Yes    Patient: Kiko Home Patient : 1975   MRN: [id number]  Reason for Admission: PNA COVID-19   Discharge Date: 21 RARS: Readmission Risk Score: 12      Last Discharge [facility]       Complaint Diagnosis Description Type Department Provider    1/3/21 Fever; Cough; Fatigue; Shortness of Breath Pneumonia due to COVID-19 virus . .. ED to Hosp-Admission (Discharged) (ADMITTED) Ray Johns MD; Margretta Fleischer ... Spoke with: Bashir 90: [facility]       Challenges to be reviewed by the provider   Additional needs identified to be addressed with provider No  none    Discussed COVID-19 related testing which was available at this time. Test results were positive. Patient informed of results, if available? Yes         Method of communication with provider : none    Advance Care Planning:   Does patient have an Advance Directive:  reviewed and current. Was this a readmission? No  Patient stated reason for admission: sob   Patients top risk factors for readmission: medical condition    Care Transition Nurse (CTN) contacted the patient by telephone to perform post hospital discharge assessment. Verified name and  with patient as identifiers. Provided introduction to self, and explanation of the CTN role. CTN reviewed discharge instructions, medical action plan and red flags with patient who verbalized understanding. Patient given an opportunity to ask questions and does not have any further questions or concerns at this time. Were discharge instructions available to patient? Yes. Reviewed appropriate site of care based on symptoms and resources available to patient including: PCP and When to call 911. The patient agrees to contact the PCP office for questions related to their healthcare.      Medication reconciliation was performed with patient, who verbalizes understanding of administration of home medications. Advised obtaining a 90-day supply of all daily and as-needed medications. Covid Risk Education    Patient has following risk factors of: no known risk factors. Education provided regarding infection prevention, and signs and symptoms of COVID-19 and when to seek medical attention with patient who verbalized understanding. Discussed exposure protocols and quarantine From [facility]: Are you at higher risk for severe illness?   and given an opportunity for questions and concerns. The patient agrees to contact the COVID-19 hotline [phone] or PCP office for questions related to COVID-19. For more information on steps you can take to protect yourself, see CDC's How to Protect Yourself       Discussed follow-up appointments. If no appointment was previously scheduled, appointment scheduling offered: Yes. Is follow up appointment scheduled within 7 days of discharge? No  [facility] follow up appointment(s):     Plan for follow-up call in 5-7 days based on severity of symptoms and risk factors. Spoke with patient who reported she is doing alright and denied any SOB or CP. Patient via speaker phone with mother Josep Mckenna. Patient has prescriptions and all medications reviewed with patient and mother. Patient's mother to reach out to PCP to setup follow up apt and was instructed patient is to quarantine until 1/18/21. [facility] has been in contact with patient and waiting to hear from nurse for soc. Denies any acute needs at present time. Agreeable to f/u calls. Educated on the use of urgent care or physicians 24 hr access line if assistance is needed after hours & that they can always contact their home care provider to request a nurse visit even if it isn't their regularly scheduled day for their nurse to visit. CTN provided contact information for future needs.           Care Transitions 24 Hour Call    Do you have all of your prescriptions and are they filled?: Yes  Have you been contacted by a [address]?: No  Have you scheduled your follow up appointment?: No  Were you discharged with any Home Care or Post Acute Services: Yes  Post Acute Services: Home Health (Comment: [facility] )  Do you feel like you have everything you need to keep you well at home?: Yes  Care Transitions Interventions         Follow Up  No future appointments.     Faisal Lizama RN

## 2021-01-15 ENCOUNTER — CARE COORDINATION (OUTPATIENT)
Dept: CASE MANAGEMENT | Age: 46
End: 2021-01-15

## 2021-01-15 NOTE — CARE COORDINATION
[facility] Transitions Follow Up Call    1/15/2021    Patient: Karri Springer  Patient : 1975   MRN: [id number]  Reason for Admission: COVID-19   Discharge Date: 21 RARS: Readmission Risk Score: 12         Spoke with: Michi Sumanth patient's mother       Spoke with Michi Sumanth patient's mother who reported patient was doing better. Michi Julio reported patient's breathing has been stable and patient is working on getting her strength back. Patient has been having some vaginal bleeding since starting the Xarelto, although Michi Julio reported patient missed her depo shot because of being in the hospital and sick. CTN encouraged Michi Sumanth to reach out to Dr. Belen Osler office to verify the vaginal bleeding. Michi Julio reported the bleeding is not severe but patient normally has no bleeding. Michi Julio reported patient has not had any signs of blood present in stool and patient hasn't had any dizziness. Michi Julio reported patient is scheduled to have depo shot on . CTN advised patient of use of urgent care or physicians 24 hr access line if assistance is needed after hours. Also advised that they can always contact their home care provider to request a nurse visit even if it isn't their regularly scheduled day for their nurse to visit. Care Transitions Subsequent and Final Call    Subsequent and Final Calls  Do you have any ongoing symptoms?: No  Have your medications changed?: No  Do you have any questions related to your medications?: No  Do you currently have any active services?: Yes  Are you currently active with any services?: Home Health  Do you have any needs or concerns that I can assist you with?: No  Identified Barriers: None  Care Transitions Interventions  Other Interventions: Follow Up  No future appointments.     Camille Steele, RN

## 2021-01-18 ENCOUNTER — CARE COORDINATION (OUTPATIENT)
Dept: CASE MANAGEMENT | Age: 46
End: 2021-01-18

## 2021-01-18 NOTE — CARE COORDINATION
[facility] Transitions Follow Up Call    2021    Patient: Corazon Melendez  Patient : 1975   MRN: [id number]  Reason for Admission: COVID-19   Discharge Date: 21 RARS: Readmission Risk Score: 12         Spoke with: Corazon Melendez and Zenaida Jhaveri patient's mother     Spoke with patient's mother Zenaida Jhaveri and patient. Zenaida Jhaveri reported patient ws doing well and denied any issues with breathing. Zenaida Jhaveri reported patient still having some vaginal bleeding and will get her depo shot tomorrow. Zenaida Jhaveri encouraged to discuss vaginal bleeding with nurse at clinic where patient will get her depo shot. Zenaidamain Jhaveri denied any worsening of patient's vaginal bleeding at this time. Zenaida Jhaveri handed phone to patient who reported she is doing well and denied any sob or concerns at this time. Zenaida Jhaveri reported PT will be out a couple more times and nurse no longer needs to see patient. Denies any acute needs at present time. Agreeable to f/u calls. Educated on the use of urgent care or physicians 24 hr access line if assistance is needed after hours & that they can always contact their home care provider to request a nurse visit even if it isn't their regularly scheduled day for their nurse to visit. Care Transitions Subsequent and Final Call    Subsequent and Final Calls  Do you have any ongoing symptoms?: No  Have your medications changed?: No  Do you have any questions related to your medications?: No  Do you currently have any active services?: Yes  Are you currently active with any services?: Home Health  Do you have any needs or concerns that I can assist you with?: No  Identified Barriers: None  Care Transitions Interventions  Other Interventions: Follow Up  No future appointments.     Roxi Vaughn RN

## 2021-01-22 ENCOUNTER — CARE COORDINATION (OUTPATIENT)
Dept: CASE MANAGEMENT | Age: 46
End: 2021-01-22

## 2021-01-22 NOTE — CARE COORDINATION
[facility] Transitions Follow Up Call    2021    Patient: Hayden Oglesby  Patient : 1975   MRN: [id number]  Reason for Admission: COVID-19   Discharge Date: 21 RARS: Readmission Risk Score: 12         Spoke with: Malik Francis patient's mother     Malik Francis reported patient is doing well and denied any issues with breathing at this time. Imani Iqbal reported patient did have her depo shot and the vaginal bleeding has slowed down but still present slightly. Imani Iqbal reported patient is able to get the COVID-19 vaccine in early FEB and wanted to know if it would be ok if she went ahead and received it. CTN encouraged Imani Iqbal to reach out to patient's PCP to discuss Covid-19 vaccine. Imani Iqbal verbalized understanding and denied any further needs. CTN informed Imani Iqbal of final call. CTN advised patient of use of urgent care or physicians 24 hr access line if assistance is needed after hours. Also advised that they can always contact their home care provider to request a nurse visit even if it isn't their regularly scheduled day for their nurse to visit. Care Transitions Subsequent and Final Call    Subsequent and Final Calls  Do you have any ongoing symptoms?: No  Have your medications changed?: No  Do you have any questions related to your medications?: No  Do you currently have any active services?: Yes  Are you currently active with any services?: Home Health  Do you have any needs or concerns that I can assist you with?: No  Identified Barriers: None  Care Transitions Interventions  Other Interventions: Follow Up  No future appointments.     Phares Landau, RN

## 2021-02-01 ENCOUNTER — OFFICE VISIT (OUTPATIENT)
Dept: FAMILY MEDICINE CLINIC | Age: 46
End: 2021-02-01
Payer: MEDICARE

## 2021-02-01 VITALS
TEMPERATURE: 97.5 F | HEIGHT: 59 IN | WEIGHT: 150 LBS | OXYGEN SATURATION: 98 % | SYSTOLIC BLOOD PRESSURE: 110 MMHG | DIASTOLIC BLOOD PRESSURE: 64 MMHG | HEART RATE: 78 BPM | BODY MASS INDEX: 30.24 KG/M2

## 2021-02-01 DIAGNOSIS — I26.99 PULMONARY EMBOLISM, UNSPECIFIED CHRONICITY, UNSPECIFIED PULMONARY EMBOLISM TYPE, UNSPECIFIED WHETHER ACUTE COR PULMONALE PRESENT (HCC): ICD-10-CM

## 2021-02-01 DIAGNOSIS — R73.9 HYPERGLYCEMIA: ICD-10-CM

## 2021-02-01 DIAGNOSIS — U07.1 PNEUMONIA DUE TO COVID-19 VIRUS: Primary | ICD-10-CM

## 2021-02-01 DIAGNOSIS — R73.09 ELEVATED GLUCOSE: ICD-10-CM

## 2021-02-01 DIAGNOSIS — Z13.29 THYROID DISORDER SCREEN: ICD-10-CM

## 2021-02-01 DIAGNOSIS — E55.9 VITAMIN D DEFICIENCY: ICD-10-CM

## 2021-02-01 DIAGNOSIS — Z13.220 LIPID SCREENING: ICD-10-CM

## 2021-02-01 DIAGNOSIS — J12.82 PNEUMONIA DUE TO COVID-19 VIRUS: Primary | ICD-10-CM

## 2021-02-01 PROCEDURE — 99495 TRANSJ CARE MGMT MOD F2F 14D: CPT | Performed by: FAMILY MEDICINE

## 2021-02-01 PROCEDURE — 1111F DSCHRG MED/CURRENT MED MERGE: CPT | Performed by: FAMILY MEDICINE

## 2021-02-01 ASSESSMENT — PATIENT HEALTH QUESTIONNAIRE - PHQ9: DEPRESSION UNABLE TO ASSESS: FUNCTIONAL CAPACITY MOTIVATION LIMITS ACCURACY

## 2021-02-01 NOTE — PROGRESS NOTES
Chief Complaint   Patient presents with    Follow-Up from Hospital       HPI:  Victor Manuel Bernal is a 39 y.o. (: 1975) here today for    Follow up from [facility] from 1/3/21-21 for covid. She is feeling much better. No SOB or chest pain. Was started on Xarelto for pulmonary embolus. It was suggested she be on it for 6 months and she wants to discuss this with you. She is complaining of her legs being sore. She also has lower extremity swelling that has gotten worse since she has been released from the hospital.     Patient's medications, allergies, past medical, surgical, social and family histories were reviewed and updated asappropriate on 2021 at 5:28 PM.    ROS:  Review of Systems    All other systems reviewed and are negative except as noted above on 2021 at 5:28 PM. Additional review of systems may be scanned into the media section ofthis medical record. Any responses requiring further intervention were pursued.     No results found for: Pavan Gomez, [address]  Past Medical History:   Diagnosis Date    COVID-19 2020    Down syndrome     Influenza A 03/10/2020     Family History   Problem Relation Age of Onset    Arthritis Mother     Cancer Mother     Diabetes Mother     High Blood Pressure Mother     High Cholesterol Mother     Arthritis Father     Hearing Loss Father     Heart Disease Father     High Blood Pressure Father     High Cholesterol Father     Stroke Father     Arthritis Sister     Cancer Sister     Asthma Maternal Grandmother     Early Death Maternal Grandfather      Social History     Socioeconomic History    Marital status: Single     Spouse name: Not on file    Number of children: Not on file    Years of education: Not on file    Highest education level: Not on file   Occupational History    Not on file   Social Needs    Financial resource strain: Not on file    Food insecurity     Worry: Not on file     Inability: Not on file  Transportation needs     Medical: Not on file     Non-medical: Not on file   Tobacco Use    Smoking status: Never Smoker    Smokeless tobacco: Never Used   Substance and Sexual Activity    Alcohol use: No    Drug use: No    Sexual activity: Not on file   Lifestyle    Physical activity     Days per week: Not on file     Minutes per session: Not on file    Stress: Not on file   Relationships    Social connections     Talks on phone: Not on file     Gets together: Not on file     Attends Nondenominational service: Not on file     Active member of club or organization: Not on file     Attends meetings of clubs or organizations: Not on file     Relationship status: Not on file    Intimate partner violence     Fear of current or ex partner: Not on file     Emotionally abused: Not on file     Physically abused: Not on file     Forced sexual activity: Not on file   Other Topics Concern    Not on file   Social History Narrative    Not on file       Prior to Visit Medications    Medication Sig Taking? Authorizing Provider   diclofenac sodium (VOLTAREN) 1 % GEL Apply 2 g topically 2 times daily as needed for Pain Yes Historical Provider, MD   rivaroxaban (XARELTO) 20 MG TABS tablet Take 1 tablet by mouth daily (with breakfast) Yes Levi Ferraro MD   rivaroxaban 15 & 20 MG Starter Pack Take 15 mg BID x 14 more days, then 20 mg daily Yes Ricardo Morrissey MD   MedroxyPROGESTERone Acetate (DEPO-PROVERA IM) Inject  into the muscle See Admin Instructions. Every three months  Yes Historical Provider, MD   Calcium Citrate-Vitamin D 200-200 MG-UNIT TABS Take  by mouth daily. Yes Historical Provider, MD     No Known Allergies    OBJECTIVE:  Estimated body mass index is 30.3 kg/m² as calculated from the following:    Height as of this encounter: 4' 11\" (1.499 m). Weight as of this encounter: 150 lb (68 kg).   Vitals:    02/01/21 1654   BP: 110/64   Site: Left Upper Arm   Position: Sitting   Cuff Size: Medium Adult Pulse: 78   Temp: 97.5 °F (36.4 °C)   TempSrc: Temporal   SpO2: 98%   Weight: 150 lb (68 kg)   Height: 4' 11\" (1.499 m)     BP Readings from Last 2 Encounters:   02/01/21 110/64   01/08/21 139/85     Wt Readings from Last 3 Encounters:   02/01/21 150 lb (68 kg)   01/03/21 150 lb (68 kg)   03/10/20 146 lb (66.2 kg)       Physical Exam  Vitals signs and nursing note reviewed. Constitutional:       General: She is not in acute distress. Appearance: She is well-developed. She is not diaphoretic. HENT:      Head: Normocephalic and atraumatic. Right Ear: External ear normal.      Left Ear: External ear normal.      Nose: Nose normal.   Eyes:      General: Lids are normal. No scleral icterus. Right eye: No discharge. Left eye: No discharge. Pupils: Pupils are equal, round, and reactive to light. Neck:      Thyroid: No thyromegaly. Vascular: No JVD. Cardiovascular:      Rate and Rhythm: Normal rate and regular rhythm. Heart sounds: Normal heart sounds. Pulmonary:      Effort: Pulmonary effort is normal. No respiratory distress. Breath sounds: Normal breath sounds. Abdominal:      Palpations: Abdomen is soft. There is no hepatomegaly or splenomegaly. Tenderness: There is no abdominal tenderness. Skin:     General: Skin is warm and dry. Coloration: Skin is not pale. Findings: No erythema or rash. Comments: Turgor normal   Psychiatric:         Behavior: Behavior normal.         Thought Content: Thought content normal.         Judgment: Judgment normal.              ASSESSMENT PLAN      Diagnosis Orders   1. Pneumonia due to COVID-19 virus  [facility] DISCHARGE MEDS RECONCILED W/ CURRENT OUTPATIENT MED LIST   2. Elevated glucose  GLUCOSE    [facility] DISCHARGE MEDS RECONCILED W/ CURRENT OUTPATIENT MED LIST   3.  Lipid screening  LIPID PANEL    [facility] DISCHARGE MEDS RECONCILED W/ CURRENT OUTPATIENT MED LIST 4. Thyroid disorder screen  [place] DISCHARGE MEDS RECONCILED W/ CURRENT OUTPATIENT MED LIST   5. Vitamin D deficiency  [place] DISCHARGE MEDS RECONCILED W/ CURRENT OUTPATIENT MED LIST   6. Pulmonary embolism, unspecified chronicity, unspecified pulmonary embolism type, unspecified whether acute cor pulmonale present ([facility] [address]  [place] DISCHARGE MEDS RECONCILED W/ CURRENT OUTPATIENT MED LIST   7. Hyperglycemia  [place] DISCHARGE MEDS RECONCILED W/ CURRENT OUTPATIENT MED LIST   Patient appears to be recovering slowly from COVID-19 pneumonia. Complication of pulmonary embolism macrovascular noted. 6 months of anticoagulation needed. 5 months of Xarelto since that she is already 1 month then. No symptoms of elevated sugar never a problem in the past but she did have some elevated numbers in the hospital.  Other screening will be done. Update her vitamin D level which was also low in the hospital.  Follow-up in 6 months. Patient should call the office immediately with new or ongoing signs or symptoms or worsening, or proceed to the emergency room. No changes in past medical history, past surgical history, social history, or family history were noted during the patient encounter unless specifically listed above. All updates of past medical history, past surgical history, social history, or family history were reviewed personally by me during the office visit. All problems listed in the assessment are stable unless noted otherwise. Medication profile reviewed personally by me during the visit. Medication side effects and possible impairments from medications were discussed as applicable. This document was prepared by a combination of typing and transcription through a voice recognition software. Patient should call the office immediately with new or ongoing signs or symptoms or worsening, or proceed to the emergency room. No changes in past medical history, past surgical history, social history, or family history were noted during the patient encounter unless specifically listed above. All updates of past medical history, past surgical history, social history, or family history were reviewed personally by me during the office visit. All problems listed in the assessment are stable unless noted otherwise. Medication profile reviewed personally by me during the visit. Medication side effects and possible impairments from medications were discussed as applicable. This document was prepared by a combination of typing and transcription through a voice recognition software. Scribe attestation: Elver Auguste MA, am scribing for and in the presence of Chong Ray MD. Electronically signed by Xavi Owens MA on 2/1/2021 at 5:28 PM      Provider attestation:     I, Dr. Shruthi Shabazz, personally performed the services described in this documentation, as scribed by the above signed scribe in my presence, and it is both accurate and complete. I agree with the ROS and Past Histories independently gathered by the clinical support staff and the remaining scribed note accurately describes my personal service to the patient.       2/1/2021    6:08 PM

## 2021-02-05 ENCOUNTER — NURSE ONLY (OUTPATIENT)
Dept: FAMILY MEDICINE CLINIC | Age: 46
End: 2021-02-05
Payer: MEDICARE

## 2021-02-05 DIAGNOSIS — R73.09 ELEVATED GLUCOSE: ICD-10-CM

## 2021-02-05 DIAGNOSIS — Z13.220 LIPID SCREENING: ICD-10-CM

## 2021-02-05 PROCEDURE — 36415 COLL VENOUS BLD VENIPUNCTURE: CPT | Performed by: FAMILY MEDICINE

## 2021-02-06 LAB
CHOLESTEROL, TOTAL: 258 MG/DL (ref 0–199)
GLUCOSE BLD-MCNC: 89 MG/DL (ref 70–99)
HDLC SERPL-MCNC: 47 MG/DL (ref 40–60)
LDL CHOLESTEROL CALCULATED: 192 MG/DL
TRIGL SERPL-MCNC: 96 MG/DL (ref 0–150)
VLDLC SERPL CALC-MCNC: 19 MG/DL

## 2021-03-22 ENCOUNTER — HOSPITAL ENCOUNTER (EMERGENCY)
Age: 46
Discharge: HOME | End: 2021-03-22
Attending: STUDENT IN AN ORGANIZED HEALTH CARE EDUCATION/TRAINING PROGRAM
Payer: MEDICARE

## 2021-03-22 ENCOUNTER — APPOINTMENT (OUTPATIENT)
Dept: GENERAL RADIOLOGY | Age: 46
End: 2021-03-22
Payer: MEDICARE

## 2021-03-22 VITALS
RESPIRATION RATE: 16 BRPM | HEART RATE: 99 BPM | OXYGEN SATURATION: 100 % | DIASTOLIC BLOOD PRESSURE: 79 MMHG | WEIGHT: 146 LBS | TEMPERATURE: 98.2 F | SYSTOLIC BLOOD PRESSURE: 122 MMHG | BODY MASS INDEX: 29.49 KG/M2

## 2021-03-22 DIAGNOSIS — J18.9 PNEUMONIA OF RIGHT LUNG DUE TO INFECTIOUS ORGANISM, UNSPECIFIED PART OF LUNG: Primary | ICD-10-CM

## 2021-03-22 DIAGNOSIS — J02.9 SORE THROAT: ICD-10-CM

## 2021-03-22 LAB
RAPID INFLUENZA  B AGN: NEGATIVE
RAPID INFLUENZA A AGN: NEGATIVE
S PYO AG THROAT QL: NEGATIVE

## 2021-03-22 PROCEDURE — 87804 INFLUENZA ASSAY W/OPTIC: CPT

## 2021-03-22 PROCEDURE — 99284 EMERGENCY DEPT VISIT MOD MDM: CPT

## 2021-03-22 PROCEDURE — 87081 CULTURE SCREEN ONLY: CPT

## 2021-03-22 PROCEDURE — 87880 STREP A ASSAY W/OPTIC: CPT

## 2021-03-22 PROCEDURE — 71046 X-RAY EXAM CHEST 2 VIEWS: CPT

## 2021-03-22 PROCEDURE — 6370000000 HC RX 637 (ALT 250 FOR IP): Performed by: STUDENT IN AN ORGANIZED HEALTH CARE EDUCATION/TRAINING PROGRAM

## 2021-03-22 RX ORDER — AZITHROMYCIN 250 MG/1
500 TABLET, FILM COATED ORAL ONCE
Status: COMPLETED | OUTPATIENT
Start: 2021-03-22 | End: 2021-03-22

## 2021-03-22 RX ORDER — AZITHROMYCIN 250 MG/1
250 TABLET, FILM COATED ORAL SEE ADMIN INSTRUCTIONS
Qty: 4 TABLET | Refills: 0 | Status: SHIPPED | OUTPATIENT
Start: 2021-03-23 | End: 2021-03-27

## 2021-03-22 RX ADMIN — AZITHROMYCIN 500 MG: 250 TABLET, FILM COATED ORAL at 19:56

## 2021-03-22 ASSESSMENT — PAIN DESCRIPTION - LOCATION
LOCATION: THROAT
LOCATION: THROAT

## 2021-03-22 ASSESSMENT — PAIN DESCRIPTION - PAIN TYPE: TYPE: ACUTE PAIN

## 2021-03-22 ASSESSMENT — PAIN SCALES - GENERAL: PAINLEVEL_OUTOF10: 2

## 2021-03-22 NOTE — ED PROVIDER NOTES
Worry: Not on file     Inability: Not on file    Transportation needs     Medical: Not on file     Non-medical: Not on file   Tobacco Use    Smoking status: Never Smoker    Smokeless tobacco: Never Used   Substance and Sexual Activity    Alcohol use: No    Drug use: No    Sexual activity: Not on file   Lifestyle    Physical activity     Days per week: Not on file     Minutes per session: Not on file    Stress: Not on file   Relationships    Social connections     Talks on phone: Not on file     Gets together: Not on file     Attends Confucianist service: Not on file     Active member of club or organization: Not on file     Attends meetings of clubs or organizations: Not on file     Relationship status: Not on file    Intimate partner violence     Fear of current or ex partner: Not on file     Emotionally abused: Not on file     Physically abused: Not on file     Forced sexual activity: Not on file   Other Topics Concern    Not on file   Social History Narrative    Not on file     No current facility-administered medications for this encounter. Current Outpatient Medications   Medication Sig Dispense Refill    azithromycin (ZITHROMAX) 250 MG tablet Take 1 tablet by mouth See Admin Instructions for 4 days 250mg on days 2 - 5 4 tablet 0    diclofenac sodium (VOLTAREN) 1 % GEL Apply 2 g topically 2 times daily as needed for Pain      rivaroxaban (XARELTO) 20 MG TABS tablet Take 1 tablet by mouth daily (with breakfast) 30 tablet 4    rivaroxaban 15 & 20 MG Starter Pack Take 15 mg BID x 14 more days, then 20 mg daily 1 Package 0    MedroxyPROGESTERone Acetate (DEPO-PROVERA IM) Inject  into the muscle See Admin Instructions. Every three months       Calcium Citrate-Vitamin D 200-200 MG-UNIT TABS Take  by mouth daily. No Known Allergies    REVIEW OF SYSTEMS  10 systems reviewed, pertinent positives per HPI otherwise noted to be negative.     PHYSICAL EXAM  /82   Pulse 94   Temp 98.2 °F (36.8 °C) (Oral)   Resp 16   Wt 146 lb (66.2 kg)   SpO2 100%   BMI 29.49 kg/m²    GENERAL APPEARANCE: Awake and alert. No distress  HEAD: Normocephalic. No sinus tenderness to palpation  EYES: Sclera anicteric. PERRL. EOM grossly intact  ENT: Mucous membranes are moist. Tolerates saliva. No trismus. Posterior oropharynx show mild erythema, no tonsillar hypertrophy or exudate. Uvula is  midline. Submandibular area is soft. No acute facial rash. NECK: Supple. No meningismus. Trachea midline. Anterior cervical LAD is not present. HEART: RRR. Radial pulses 2+. LUNGS: Respirations unlabored. Clear to auscultation bilaterally  ABDOMEN: Soft. No guarding or rebound. EXTREMITIES: No acute deformities. Calves symmetric and non-tender. SKIN: Warm and dry. NEUROLOGICAL: No gross facial drooping. Strength and sensation intact. PSYCHIATRIC: Normal mood and affect. LABS  I have reviewed all labs for this visit. Results for orders placed or performed during the hospital encounter of 03/22/21   Rapid influenza A/B antigens    Specimen: Nasopharyngeal   Result Value Ref Range    Rapid Influenza A Ag Negative Negative    Rapid Influenza B Ag Negative Negative   Strep screen group a throat    Specimen: Throat   Result Value Ref Range    Rapid Strep A Screen Negative Negative   Culture, Beta Strep Confirm Plates    Specimen: Throat   Result Value Ref Range    Strep A Culture Further report to follow        RADIOLOGY  XR CHEST (2 VW)   Final Result   Hazy right basilar interstitial prominence which is decreased and could   represent a recurrent early infiltrate vs residual mild parenchymal fibrosis   and scarring. Recommend short-term follow-up. Moderate rotoscoliosis of the spine which is unchanged. ED COURSE / MDM  Patient seen and evaluated. Old records reviewed. Labs and imaging reviewed and results discussed with patient.       Differential diagnosis includes but not limited to: viral illness, strep, mono, pneumonia, COVID, Viral pharyngitis, strep throat, mononucleosis, low suspicion for peritonsillar abscess, retropharyngeal abscess, epiglottitis, or Lizbeth's angina    During the patient's ED course, the patient was given:  Medications   azithromycin (ZITHROMAX) tablet 500 mg (500 mg Oral Given 3/22/21 1956)      On visual examination of the back of the throat, I do not appreciate evidence of peritonsillar abscess. Patient denies muffled voice, patient does not have neck stiffness and is tolerating secretions. There is no stridor on exam.  Low suspicion for retropharyngeal abscess or epiglottitis at this time. Floor of mouth is soft to palpation, no evidence of Lizbeth's angina. Strep swab negative, no indication for empiric antibiotics based off exam or centor criteria. Flu swab negative. Patient and family decline repeat COVID swab given that patient has had COVID and been vaccinated. CXR with right sided haziness that could be early pneumonia verses scarring. Patient did have very serious lung pathology related to her COVID. Feel that more conservative measures appropriate. At this time, feel that treatment with antibiotics is safest course. Patient is overall well appearing and not requiring oxygen, at this time do not feel that she needs IV antibiotics. At this time I have low concern for pulmonary embolism. Patient denies chest pain or SOB. Patient has had a previous blood clot related to COVID but is on Xaralto and has been compliant with her medications. Patient denies other risk factors for pulmonary embolism. Patient does not have any evidence of DVT on exam.  Patient is low risk on Wells criteria. At this time, considering that risks associated with further work-up for pulmonary embolism outweigh the likelihood of this diagnosis.      I estimate there is LOW risk for ACUTE CORONARY SYNDROME, PNEUMONIA REQUIRING ADMISSION, SEPSIS OR  DEEP SPACE INFECTION (e.g., LIZBETHS ANGINA OR RETROPHARYNGEAL ABSCESS), BACTERIAL MENINGITIS, BACTERIAL TRACHEITIS, or AIRWAY COMPROMISE thus I consider the discharge disposition reasonable. oDri Vann and I have discussed the diagnosis and risks, and we agree with discharging home with close follow-up. We also discussed returning to the Emergency Department immediately if new or worsening symptoms occur. We have discussed the symptoms which are most concerning that necessitate immediate return. CLINICAL IMPRESSION  1. Pneumonia of right lung due to infectious organism, unspecified part of lung    2. Sore throat        Blood pressure 122/79, pulse 99, temperature 98.2 °F (36.8 °C), temperature source Oral, resp. rate 16, weight 146 lb (66.2 kg), SpO2 100 %, not currently breastfeeding. DISPOSITION  Dori Vann was discharged to home in stable condition. Patient was given scripts for the following medications. I counseled patient how to take these medications. Discharge Medication List as of 3/22/2021  7:37 PM      START taking these medications    Details   azithromycin (ZITHROMAX) 250 MG tablet Take 1 tablet by mouth See Admin Instructions for 4 days 250mg on days 2 - 5, Disp-4 tablet, R-0Normal             Follow-up with:  Ninoska Gonzalez MD  [address] [place] [address]. Ashlie Bruce  [phone]    Schedule an appointment as soon as possible for a visit in 2 days      [place] [id number]. [facility] Emergency Department  [address] [place] [address],[address]  [phone]  Go to   As needed, If symptoms worsen      DISCLAIMER: This chart was created using Dragon dictation software. Efforts were made by me to ensure accuracy, however some errors may be present due to limitations of this technology and occasionally words are not transcribed correctly.         Dafne Mello MD  03/25/21 5601

## 2021-03-25 LAB — S PYO THROAT QL CULT: NORMAL

## 2021-07-13 ENCOUNTER — OFFICE VISIT (OUTPATIENT)
Dept: FAMILY MEDICINE CLINIC | Age: 46
End: 2021-07-13
Payer: MEDICARE

## 2021-07-13 VITALS
HEART RATE: 88 BPM | BODY MASS INDEX: 30.3 KG/M2 | WEIGHT: 150 LBS | DIASTOLIC BLOOD PRESSURE: 78 MMHG | OXYGEN SATURATION: 99 % | SYSTOLIC BLOOD PRESSURE: 108 MMHG

## 2021-07-13 DIAGNOSIS — R73.9 HYPERGLYCEMIA: ICD-10-CM

## 2021-07-13 DIAGNOSIS — R53.83 FATIGUE, UNSPECIFIED TYPE: ICD-10-CM

## 2021-07-13 DIAGNOSIS — I26.99 PULMONARY EMBOLISM, UNSPECIFIED CHRONICITY, UNSPECIFIED PULMONARY EMBOLISM TYPE, UNSPECIFIED WHETHER ACUTE COR PULMONALE PRESENT (HCC): ICD-10-CM

## 2021-07-13 DIAGNOSIS — M75.42 ROTATOR CUFF IMPINGEMENT SYNDROME OF LEFT SHOULDER: Primary | ICD-10-CM

## 2021-07-13 DIAGNOSIS — L24.9 IRRITANT CONTACT DERMATITIS, UNSPECIFIED TRIGGER: ICD-10-CM

## 2021-07-13 LAB
BASOPHILS ABSOLUTE: 0 K/UL (ref 0–0.2)
BASOPHILS RELATIVE PERCENT: 0.6 %
EOSINOPHILS ABSOLUTE: 0 K/UL (ref 0–0.6)
EOSINOPHILS RELATIVE PERCENT: 0.2 %
HCT VFR BLD CALC: 43.4 % (ref 36–48)
HEMOGLOBIN: 14.6 G/DL (ref 12–16)
LYMPHOCYTES ABSOLUTE: 1.8 K/UL (ref 1–5.1)
LYMPHOCYTES RELATIVE PERCENT: 29.7 %
MCH RBC QN AUTO: 32.9 PG (ref 26–34)
MCHC RBC AUTO-ENTMCNC: 33.6 G/DL (ref 31–36)
MCV RBC AUTO: 97.9 FL (ref 80–100)
MONOCYTES ABSOLUTE: 0.5 K/UL (ref 0–1.3)
MONOCYTES RELATIVE PERCENT: 8.6 %
NEUTROPHILS ABSOLUTE: 3.6 K/UL (ref 1.7–7.7)
NEUTROPHILS RELATIVE PERCENT: 60.9 %
PDW BLD-RTO: 14.4 % (ref 12.4–15.4)
PLATELET # BLD: 180 K/UL (ref 135–450)
PMV BLD AUTO: 8.9 FL (ref 5–10.5)
RBC # BLD: 4.43 M/UL (ref 4–5.2)
WBC # BLD: 5.9 K/UL (ref 4–11)

## 2021-07-13 PROCEDURE — G8417 CALC BMI ABV UP PARAM F/U: HCPCS | Performed by: FAMILY MEDICINE

## 2021-07-13 PROCEDURE — 99214 OFFICE O/P EST MOD 30 MIN: CPT | Performed by: FAMILY MEDICINE

## 2021-07-13 PROCEDURE — G8427 DOCREV CUR MEDS BY ELIG CLIN: HCPCS | Performed by: FAMILY MEDICINE

## 2021-07-13 PROCEDURE — 36415 COLL VENOUS BLD VENIPUNCTURE: CPT | Performed by: FAMILY MEDICINE

## 2021-07-13 PROCEDURE — 1036F TOBACCO NON-USER: CPT | Performed by: FAMILY MEDICINE

## 2021-07-13 ASSESSMENT — PATIENT HEALTH QUESTIONNAIRE - PHQ9
SUM OF ALL RESPONSES TO PHQ QUESTIONS 1-9: 0
SUM OF ALL RESPONSES TO PHQ9 QUESTIONS 1 & 2: 0
1. LITTLE INTEREST OR PLEASURE IN DOING THINGS: 0
SUM OF ALL RESPONSES TO PHQ QUESTIONS 1-9: 0
SUM OF ALL RESPONSES TO PHQ QUESTIONS 1-9: 0
2. FEELING DOWN, DEPRESSED OR HOPELESS: 0

## 2021-07-13 NOTE — PROGRESS NOTES
Chief Complaint   Patient presents with    Other     follow up from starting blood thinner        Internal Administration   First Dose      Second Dose           Last COVID Lab No results found for: SARS-COV-2, SARS-COV-2 RNA, SARS-COV-2, SARS-COV-2, SARS-COV-2 BY PCR, SARS-COV-2, SARS-COV-2, SARS-COV-2          Wt Readings from Last 3 Encounters:   21 150 lb (68 kg)   21 146 lb (66.2 kg)   21 150 lb (68 kg)     BP Readings from Last 3 Encounters:   21 108/78   21 122/79   21 110/64      No results found for: LABA1C    HPI:  Timmy Jones is a 42 y.o. (: 1975) here today for    Patient states that she finished her xarelto. Was on it for 6 months due to PE from January. Had no hisotry of blood clots. Denies any shortness of breath or swelling. She has been having a lot of left shoulder pain though and her father took her to see Dr. Martha Nieves with orthopedics. They did imaging and believe the issue is due to musculoskeletal. She has been using voltaren gel and a heating pad as needed which has helped with the pain but she has limited range of monitor. Parents also noticed that she is not as energetic as before and less wanting to do the things she did prior including her energy to help with chores around the house. She has also seemed to be a lot more reactive during this time. Father is concerned its all due to this shoulder pain. Patient also has a dermatitis on the face over her cheeks and chin. Unsure of the cause of it. [x] Patient has completed an advance directive  [] Patient has NOT completed an advanced directive  [x] Patient has a documented healthcare surrogate  [] Patient does NOT have a documented healthcare surrogate  [] Discussed the importance of establishing and updating an advanced directive. Patient has questions at this time and those were answered. [x] Discussed the importance of establishing and updating an advanced directive.   Patient does NOT have questions at this time. Discussed with: [x] Patient            [] Family             [] Other caregiver    Patient's medications, allergies, past medical, surgical, social and family histories were reviewed and updated asappropriate on 7/13/2021 at 3:36 PM.    ROS:  Review of Systems    All other systems reviewed and are negative except as noted above on 7/13/2021 at 3:36 PM. Additional review of systems may be scanned into the media section ofthis medical record. Any responses requiring further intervention were pursued. Past Medical History:   Diagnosis Date    COVID-19 12/28/2020    Down syndrome     Influenza A 03/10/2020     Family History   Problem Relation Age of Onset    Arthritis Mother     Cancer Mother     Diabetes Mother     High Blood Pressure Mother     High Cholesterol Mother     Arthritis Father     Hearing Loss Father     Heart Disease Father     High Blood Pressure Father     High Cholesterol Father     Stroke Father     Arthritis Sister     Cancer Sister     Asthma Maternal Grandmother     Early Death Maternal Grandfather      Social History     Socioeconomic History    Marital status: Single     Spouse name: Not on file    Number of children: Not on file    Years of education: Not on file    Highest education level: Not on file   Occupational History    Not on file   Tobacco Use    Smoking status: Never Smoker    Smokeless tobacco: Never Used   Substance and Sexual Activity    Alcohol use: No    Drug use: No    Sexual activity: Not on file   Other Topics Concern    Not on file   Social History Narrative    Not on file     Social Determinants of Health     Financial Resource Strain:     Difficulty of Paying Living Expenses:    Food Insecurity:     Worried About Running Out of Food in the Last Year:     [address] in the Last Year:    Transportation Needs:     Lack of Transportation (Medical):      Lack of Transportation (Non-Medical): Physical Activity:     Days of Exercise per Week:     Minutes of Exercise per Session:    Stress:     Feeling of Stress :    Social Connections:     Frequency of Communication with Friends and Family:     Frequency of Social Gatherings with Friends and Family:     Attends Confucianism Services:     Active Member of Clubs or Organizations:     Attends Club or Organization Meetings:     Marital Status:    Intimate Partner Violence:     Fear of Current or Ex-Partner:     Emotionally Abused:     Physically Abused:     Sexually Abused:      Prior to Visit Medications    Medication Sig Taking? Authorizing Provider   diclofenac sodium (VOLTAREN) 1 % GEL Apply 2 g topically 2 times daily as needed for Pain Yes Historical Provider, MD   MedroxyPROGESTERone Acetate (DEPO-PROVERA IM) Inject  into the muscle See Admin Instructions. Every three months  Yes Historical Provider, MD   Calcium Citrate-Vitamin D 200-200 MG-UNIT TABS Take  by mouth daily. Yes Historical Provider, MD     No Known Allergies    OBJECTIVE:  Estimated body mass index is 30.3 kg/m² as calculated from the following:    Height as of 2/1/21: 4' 11\" (1.499 m). Weight as of this encounter: 150 lb (68 kg). Vitals:    07/13/21 1522   BP: 108/78   Site: Right Upper Arm   Position: Sitting   Pulse: 88   SpO2: 99%   Weight: 150 lb (68 kg)       Physical Exam  Vitals and nursing note reviewed. Constitutional:       General: She is not in acute distress. Appearance: She is well-developed. She is not diaphoretic. HENT:      Head: Normocephalic and atraumatic. Right Ear: External ear normal.      Left Ear: External ear normal.      Nose: Nose normal.   Eyes:      General: Lids are normal. No scleral icterus. Right eye: No discharge. Left eye: No discharge. Pupils: Pupils are equal, round, and reactive to light. Neck:      Thyroid: No thyromegaly. Vascular: No JVD.    Cardiovascular:      Rate and Rhythm: Normal rate and regular rhythm. Heart sounds: Normal heart sounds. Pulmonary:      Effort: Pulmonary effort is normal. No respiratory distress. Breath sounds: Normal breath sounds. Abdominal:      Palpations: Abdomen is soft. There is no hepatomegaly or splenomegaly. Tenderness: There is no abdominal tenderness. Musculoskeletal:      Left shoulder: Decreased range of motion (Abducts to 70 degrees, thumb to thumb distance back scratch test is 8 to 10 cm). Skin:     General: Skin is warm and dry. Coloration: Skin is not pale. Findings: No erythema or rash. Comments: Turgor normal   Psychiatric:         Behavior: Behavior normal.         Thought Content: Thought content normal.         Judgment: Judgment normal.              ASSESSMENT PLAN      Diagnosis Orders   1. Rotator cuff impingement syndrome of left shoulder  OSR  - [facility] Physical Therapy   2. Hyperglycemia  HEMOGLOBIN A1C   3. Fatigue, unspecified type  CBC WITH AUTO DIFFERENTIAL    TSH with Reflex    T4, FREE   4. Irritant contact dermatitis, unspecified trigger     5. Pulmonary embolism, unspecified chronicity, unspecified pulmonary embolism type, unspecified whether acute cor pulmonale present [facility])     Patient appears to have significant reduction in motion of the left shoulder and is a source of pain, which may be contributing to some of her lack of attentio detail, examples given are folding the laundry or unloading the . I believe formal physical therapy is warranted. May still need an MRI in the future. They have already seen Dr. Tabatha Lorenzana with orthopedics. Also look for medical causes of fatigue related to CBC thyroid and do an A1c. This was recently discovered so not likely to be a long Covid symptom going back to January. Contact dermatitis related to mask wearing will use over-the-counter hydrocortisone.   No deleterious effects of going off of anticoagulant since the pulmonary embolism which was likely triggered by COVID-19 in January 2021. She already has an appointment in early August for follow-up. Reexamine the shoulder range of motion at that time as well as follow-up on her fatigue and if she has pain closer attention to detail and performing tasks that she used to. Patient should call the office immediately with new or ongoing signs or symptoms or worsening, or proceed to the emergency room. No changes in past medical history, past surgical history, social history, or family history were noted during the patient encounter unless specifically listed above. All updates of past medical history, past surgical history, social history, or family history were reviewed personally by me during the office visit. All problems listed in the assessment are stable unless noted otherwise. Medication profile reviewed personally by me during the visit. Medication side effects and possible impairments from medications were discussed as applicable. This document was prepared by a combination of typing and transcription through a voice recognition software. Scribe attestation: Chris Chappell RN, am scribing for and in the presence of Leslie Garcia MD. Electronically signed by Shikha Rojo RN on 7/13/2021 at 3:36 PM      Provider attestation:     I, Dr. Desiree Sanabria, personally performed the services described in this documentation, as scribed by the above signed scribe in my presence, and it is both accurate and complete. I agree with the ROS and Past Histories independently gathered by the clinical support staff and the remaining scribed note accurately describes my personal service to the patient.       7/13/2021    4:13 PM

## 2021-07-14 LAB
ESTIMATED AVERAGE GLUCOSE: 108.3 MG/DL
HBA1C MFR BLD: 5.4 %
T4 FREE: 1.2 NG/DL (ref 0.9–1.8)
TSH REFLEX: 2.2 UIU/ML (ref 0.27–4.2)

## 2021-07-26 ENCOUNTER — HOSPITAL ENCOUNTER (OUTPATIENT)
Dept: PHYSICAL THERAPY | Age: 46
Setting detail: THERAPIES SERIES
Discharge: HOME | End: 2021-07-26
Payer: MEDICARE

## 2021-07-26 PROCEDURE — 97110 THERAPEUTIC EXERCISES: CPT

## 2021-07-26 PROCEDURE — 97140 MANUAL THERAPY 1/> REGIONS: CPT

## 2021-07-26 PROCEDURE — 97161 PT EVAL LOW COMPLEX 20 MIN: CPT

## 2021-07-26 NOTE — PLAN OF CARE
[address] [address] Richar Krishnamurthy  Phone: [phone], [phone]                                                    Physical Therapy Certification    Dear Referring Practitioner: Lazarus Lieu,    We had the pleasure of evaluating the following patient for physical therapy services at [address] [place] [address]. A summary of our findings can be found in the initial assessment below. This includes our plan of care. If you have any questions or concerns regarding these findings, please do not hesitate to contact me at the office phone number checked above. Thank you for the referral.       Physician Signature:_______________________________Date:__________________  By signing above (or electronic signature), therapists plan is approved by physician      Patient: Augusto Link   : 1975   MRN: [id number]  Referring Physician: Referring Practitioner: Lazarus Lieu      Evaluation Date: 2021      Medical Diagnosis Information:  Diagnosis: Left Shoulder Rotator Cuff Impingement   Treatment Diagnosis: M75.42                                         Insurance information: PT Insurance Information: Medicare/Medicaid    Precautions/ Contra-indications/Relevant Medical History: Downs Syndrome     C-SSRS Triggered by Intake questionnaire (Past 2 wk assessment):   [x] No, Questionnaire did not trigger screening.   [] Yes, Patient intake triggered further evaluation      [] C-SSRS Screening completed  [] PCP notified via Plan of Care  [] Emergency services notified     Latex Allergy:  [x]NO      []YES     Preferred Language for Healthcare:   [x]English       []other:     SUBJECTIVE: Patient stated complaint: Patient is a 41 y/o female who presents with increased left shoulder which began about 6-9 months ago.  Patient reports having trouble with bowling and going Workshop     Functional Disability Index: Quick Dash: 32% (M05.9)  []Osteoarthritis (M19.91)   Cardiovascular conditions   []Hypertension (I10)  []Hyperlipidemia (E78.5)  []Angina pectoris (I20)  []Atherosclerosis (I70)   Musculoskeletal conditions   []Disc pathology   []Congenital spine pathologies   []Prior surgical intervention  []Osteoporosis (M81.8)  []Osteopenia (M85.8)   Endocrine conditions   []Hypothyroid (E03.9)  []Hyperthyroid Gastrointestinal conditions   []Constipation (K07.46)   Metabolic conditions   []Morbid obesity (E66.01)  []Diabetes type 1(E10.65) or 2 (E11.65)   []Neuropathy (G60.9)     Pulmonary conditions   []Asthma (J45)  []Coughing   []COPD (J44.9)   Psychological Disorders  []Anxiety (F41.9)  []Depression (F32.9)   []Other:   [x]Other: Hx Covid -19 December 2020, Downs Syndrome,          Barriers to/and or personal factors that will affect rehab potential:              []Age  []Sex              []Motivation/Lack of Motivation                        []Co-Morbidities              [x]Cognitive Function, education/learning barriers              []Environmental, home barriers              []profession/work barriers  []past PT/medical experience  []other:  Justification:     Falls Risk Assessment (30 days):   [x] Falls Risk assessed and no intervention required.   [] Falls Risk assessed and Patient requires intervention due to being higher risk   TUG score (>12s at risk):     [] Falls education provided, including     ASSESSMENT:   Functional Impairments   []Noted spinal or UE joint hypomobility   []Noted spinal or UE joint hypermobility   [x]Decreased UE functional ROM   [x]Decreased UE functional strength   []Abnormal reflexes/sensation/myotomal/dermatomal deficits   [x]Decreased RC/scapular/core strength and neuromuscular control   []other:      Functional Activity Limitations (from functional questionnaire and intake)   []Reduced ability to tolerate prolonged functional positions   []Reduced ability or difficulty with changes of positions or transfers between positions   [x]Reduced ability to maintain good posture and demonstrate good body mechanics with sitting, bending, and lifting   [] Reduced ability or tolerance with driving and/or computer work   []Reduced ability to sleep   [x]Reduced ability to perform lifting, reaching, carrying tasks   [x]Reduced ability to tolerate impact through UE   [x]Reduced ability to reach behind back   [x]Reduced ability to  or hold objects   [x]Reduced ability to throw or toss an object   []other:    Participation Restrictions   []Reduced participation in self care activities   [x]Reduced participation in home management activities   [x]Reduced participation in work activities   [x]Reduced participation in social activities. [x]Reduced participation in sport/recreation activities. Classification:   []Signs/symptoms consistent with post-surgical status including decreased ROM, strength and function. []Signs/symptoms consistent with joint sprain/strain   [x]Signs/symptoms consistent with shoulder impingement   []Signs/symptoms consistent with shoulder/elbow/wrist tendinopathy   []Signs/symptoms consistent with Rotator cuff tear   []Signs/symptoms consistent with labral tear   []Signs/symptoms consistent with postural dysfunction    []Signs/symptoms consistent with Glenohumeral IR Deficit - <45 degrees   []Signs/symptoms consistent with facet dysfunction of cervical/thoracic spine    []Signs/symptoms consistent with pathology which may benefit from Dry needling     []other:      Tolerance of evaluation/treatment:    []Excellent   [x]Good    []Fair   []Poor    Physical Therapy Evaluation Complexity Justification   [x] A history of present problem with:  [] no personal factors and/or comorbidities that impact the plan of care;  []1-2 personal factors and/or comorbidities that impact the plan of care  [x]3 personal factors and/or comorbidities that impact the plan of care  [x] An examination of body systems using standardized tests and measures addressing any of the following: body structures and functions (impairments), activity limitations, and/or participation restrictions;:  [] a total of 1-2 or more elements   [] a total of 3 or more elements   [x] a total of 4 or more elements   [x] A clinical presentation with:  [x] stable and/or uncomplicated characteristics   [] evolving clinical presentation with changing characteristics  [] unstable and unpredictable characteristics;   [x] Clinical decision making of [x] low, [] moderate, [] high complexity using standardized patient assessment instrument and/or measurable assessment of functional outcome. [x] EVAL (LOW) 74984 (typically 20 minutes face-to-face)  [] EVAL (MOD) 44594 (typically 30 minutes face-to-face)  [] EVAL (HIGH) 26460 (typically 45 minutes face-to-face)  [] RE-EVAL     PLAN:  Frequency/Duration:  1-2 days per week for 12 weeks:  INTERVENTIONS:  [x]  Therapeutic exercise including: strength training, ROM, for upper extremity and core   [x]  NMR activation and proprioception for UE and Core   [x]  Manual therapy as indicated for UE and spine to include: Dry Needling/IASTM, STM, PROM, Gr I-IV mobilizations, manipulation. [x] Modalities as needed that may include: thermal agents, E-stim, Biofeedback, US, iontophoresis as indicated  [x] Patient education on joint protection, postural re-education, activity modification, progression of HEP. HEP instruction: Refer to [address] [place] [address] code and exercises on the 1st visit treatment note    GOALS:      Short Term Goals: To be achieved in: 2 weeks  1. Independent in HEP and progression per patient tolerance, in order to prevent re-injury. []?? Progressing: []?? Met: []?? Not Met: []?? Adjusted       2. Patient will have a decrease in pain to facilitate improvement in movement, function, and ADLs as indicated by Functional Deficits.   []?? Progressing: []?? Met: []?? Not Met: []?? Adjusted          Long Term

## 2021-07-26 NOTE — FLOWSHEET NOTE
mins                             Medbridge access code:   Access Code: [email or web address]  URL: [email or web address]  Date: 07/26/2021  Prepared by: Victor Manuel Lassiter    Exercises  Seated Shoulder Shrugs - 1 x daily - 7 x weekly - 3 sets - 10 reps  Standing Bicep Curls Neutral with Dumbbells - 1 x daily - 7 x weekly - 3 sets - 10 reps  Seated Shoulder External Rotation - 1 x daily - 7 x weekly - 3 sets - 10 reps  Standing shoulder flexion wall slides - 1 x daily - 7 x weekly - 3 sets - 10 reps                    Patient Education 10' Pt education with HEP and progression of PT along with compliance with HEP to aide with formal PT for optimal outcomes. Therapeutic Exercise and NMR EXR  [x] (16364) Provided verbal/tactile cueing for activities related to strengthening, flexibility, endurance, ROM  for improvements in scapular, scapulothoracic and UE control with self care, reaching, carrying, lifting, house/yardwork, driving/computer work. [x] (13030) Provided verbal/tactile cueing for activities related to improving balance, coordination, kinesthetic sense, posture, motor skill, proprioception  to assist with  scapular, scapulothoracic and UE control with self care, reaching, carrying, lifting, house/yardwork, driving/computer work. Therapeutic Activities:    [x] (14474 or 69479) Provided verbal/tactile cueing for activities related to improving balance, coordination, kinesthetic sense, posture, motor skill, proprioception and motor activation to allow for proper function of scapular, scapulothoracic and UE control with self care, carrying, lifting, driving/computer work.      Home Exercise Program:    [x] (69123) Reviewed/Progressed HEP activities related to strengthening, flexibility, endurance, ROM of scapular, scapulothoracic and UE control with self care, reaching, carrying, lifting, house/yardwork, driving/computer work  [x] (53149) Reviewed/Progressed HEP activities related to improving balance, coordination, kinesthetic sense, posture, motor skill, proprioception of scapular, scapulothoracic and UE control with self care, reaching, carrying, lifting, house/yardwork, driving/computer work      Manual Treatments:  PROM / STM / Oscillations-Mobs:  G-I, II, III, IV (PA's, Inf., Post.)  [x] (61298) Provided manual therapy to mobilize soft tissue/joints of cervical/CT, scapular GHJ and UE for the purpose of modulating pain, promoting relaxation,  increasing ROM, reducing/eliminating soft tissue swelling/inflammation/restriction, improving soft tissue extensibility and allowing for proper ROM for normal function with self care, reaching, carrying, lifting, house/yardwork, driving/computer work    Modalities:      Charges:  Timed Code Treatment Minutes: 30'   Total Treatment Minutes:  45'   BWC:  TE TIME:  NMR TIME:  MANUAL TIME:  UNTIMED MINUTES:  Medicare Total:   -  -  -  -  Visit 1 -- total $ 100       [x] EVAL (LOW) 31330 (typically 20 minutes face-to-face)  [] EVAL (MOD) 53714 (typically 30 minutes face-to-face)  [] EVAL (HIGH) 92022 (typically 45 minutes face-to-face)  [] RE-EVAL     [x] DF(34851) x  1   [] IONTO  [x] NMR (65302) x 1    [] VASO  [] Manual (34858) x     [] Other:  [] TA x      [] Mech Traction (56943)  [] ES(attended) (43871)     [] ES (un) (51308):    ASSESSMENT:  See eval    GOALS:   Short Term Goals: To be achieved in: 2 weeks  1. Independent in HEP and progression per patient tolerance, in order to prevent re-injury. []? Progressing: []? Met: []? Not Met: []? Adjusted       2. Patient will have a decrease in pain to facilitate improvement in movement, function, and ADLs as indicated by Functional Deficits. []? Progressing: []? Met: []? Not Met: []? Adjusted          Long Term Goals: To be achieved in: 12 weeks  1. Disability index score of 20% or less for the Quick Dash 11  to assist with reaching prior level of function. []? Progressing: []? Met: []? Not Met: []? Adjusted      2. Patient will demonstrate increased AROM to equal the opposite side bilaterally to allow for proper joint functioning as indicated by patients Functional Deficits. []? Progressing: []? Met: []? Not Met: []? Adjusted       3. Patient will demonstrate an increase in strength B UE to 4/5 grossly to allow for proper functional mobility as indicated by patients Functional Deficits. []? Progressing: []? Met: []? Not Met: []? Adjusted       4. Patient will return to all transfers, work activities, and functional activities without increased symptoms or restriction. []? Progressing: []? Met: []? Not Met: []? Adjusted       5. Patient will have 0/10 pain with ADL's.  []? Progressing: []? Met: []? Not Met: []? Adjusted       6. Patient stated goal: To be able to attend work shop and bowl without severe increase in pain. []? Progressing: []? Met: []? Not Met: []? Adjusted     Overall Progression Towards Functional goals/ Treatment Progress Update:  [] Patient is progressing as expected towards functional goals listed. [] Progression is slowed due to complexities/Impairments listed. [] Progression has been slowed due to co-morbidities.   [x] Plan just implemented, too soon to assess goals progression <30days   [] Goals require adjustment due to lack of progress  [] Patient is not progressing as expected and requires additional follow up with physician  [] Other    Prognosis for POC: [x] Good [] Fair  [] Poor    Patient requires continued skilled intervention: [x] Yes  [] No    Treatment/Activity Tolerance:  [x] Patient able to complete treatment  [] Patient limited by fatigue  [] Patient limited by pain    [] Patient limited by other medical complications  [] Other:     Return to Play: (if applicable)   []  Stage 1: Intro to Strength   []  Stage 2: Return to Run and Strength   []  Stage 3: Return to Jump and Strength   []  Stage 4: Dynamic Strength and Agility   []  Stage 5: Sport Specific Training     []  Ready to Return to Play, Meets All Above Stages   []  Not Ready for Return to Sports   Comments:                         PLAN: See eval  [] Continue per plan of care [] Alter current plan (see comments above)  [x] Plan of care initiated [] Hold pending MD visit [] Discharge    Electronically signed by:  Matthew Jones, PT, MPT,ATC    Note: If patient does not return for scheduled/ recommended follow up visits, this note will serve as a discharge from care along with most recent update on progress.

## 2021-08-04 ENCOUNTER — HOSPITAL ENCOUNTER (OUTPATIENT)
Dept: PHYSICAL THERAPY | Age: 46
Setting detail: THERAPIES SERIES
Discharge: HOME | End: 2021-08-04
Payer: MEDICARE

## 2021-08-04 PROCEDURE — 97110 THERAPEUTIC EXERCISES: CPT

## 2021-08-04 PROCEDURE — 97112 NEUROMUSCULAR REEDUCATION: CPT

## 2021-08-04 PROCEDURE — 97140 MANUAL THERAPY 1/> REGIONS: CPT

## 2021-08-04 NOTE — FLOWSHEET NOTE
[facility], [address] [place] [address] [address], [address]  Phone: [phone], [phone]    Physical Therapy Treatment Note/ Progress Report:     Date:  2021    Patient Name:  Nydia Heart    :  1975  MRN: [id number]  Restrictions/Precautions:    Medical/Treatment Diagnosis Information:  · Diagnosis: Left Shoulder Rotator Cuff Impingement  · Treatment Diagnosis: P12.95  Insurance/Certification information:  PT Insurance Information: Medicare/Medicaid  Physician Information:  Referring Practitioner: Celina Sol  Has the plan of care been signed (Y/N):        []  Yes  [x]  No     Date of Patient follow up with Physician:     Is this a Progress Report:     []  Yes  [x]  No      If Yes:  Date Range for reporting period:  Initial Eval: 2021  Beginnin2021 --- Endin2021    Progress report will be due (10 Rx or 30 days whichever is less):      Recertification will be due (POC Duration  / 90 days whichever is less): 10/28/2021      Visit # Insurance Allowable Auth Required   In Person 2 Med Nec []  Yes     []  No    Tele Health -  []  Yes     []  No    Total 2         Functional Scale: Quick Dash: 32% (Total Number Sum: 55)   Date assessed: 2021     Latex Allergy:  [x]NO      []YES  Preferred Language for Healthcare:   [x]English       []other:    Pain level:  2/10 current; 8/10 at worst with activity    SUBJECTIVE:  Pt reports doing HEP a little, states she has swam 2x this week    OBJECTIVE: See eval   Observation:    Test measurements:      RESTRICTIONS/PRECAUTIONS:   Exercises/Interventions:   Therapeutic Ex (17615)  Therapeutic Activity (66692)  NMR re-education (77243) Sets/Reps Notes/CUES   P[place]ley               Shrug with Scap Squeeze 15 x     B Shoulder ER with Scap Squeeze 15 x     Wall Flexion 15 x     Elbow Flex/Ext 15 x          Supine Cane Flex 10 x     Supine Cane Press 10 x    Supine Cane Punch 10 x    Supine Horizontal Abduciton 10 x Postural Ed for Father and patient  8'                                                 Manual Intervention (93283)     PROM  Shoulder 10 mins                             Medbridge access code:   Access Code: [email or web address]  URL: [email or web address]  Date: 07/26/2021  Prepared by: Nell Rod    Exercises  Seated Shoulder Shrugs - 1 x daily - 7 x weekly - 3 sets - 10 reps  Standing Bicep Curls Neutral with Dumbbells - 1 x daily - 7 x weekly - 3 sets - 10 reps  Seated Shoulder External Rotation - 1 x daily - 7 x weekly - 3 sets - 10 reps  Standing shoulder flexion wall slides - 1 x daily - 7 x weekly - 3 sets - 10 reps                    Patient Education 10' Pt education with HEP and progression of PT along with compliance with HEP to aide with formal PT for optimal outcomes. Therapeutic Exercise and NMR EXR  [x] (53588) Provided verbal/tactile cueing for activities related to strengthening, flexibility, endurance, ROM  for improvements in scapular, scapulothoracic and UE control with self care, reaching, carrying, lifting, house/yardwork, driving/computer work. [x] (97835) Provided verbal/tactile cueing for activities related to improving balance, coordination, kinesthetic sense, posture, motor skill, proprioception  to assist with  scapular, scapulothoracic and UE control with self care, reaching, carrying, lifting, house/yardwork, driving/computer work. Therapeutic Activities:    [x] (36332 or 23761) Provided verbal/tactile cueing for activities related to improving balance, coordination, kinesthetic sense, posture, motor skill, proprioception and motor activation to allow for proper function of scapular, scapulothoracic and UE control with self care, carrying, lifting, driving/computer work.      Home Exercise Program:    [x] (28852) Reviewed/Progressed HEP activities related to strengthening, flexibility, endurance, ROM of scapular, scapulothoracic and UE control with self care, 20% or less for the Quick Dash 11  to assist with reaching prior level of function. []? Progressing: []? Met: []? Not Met: []? Adjusted      2. Patient will demonstrate increased AROM to equal the opposite side bilaterally to allow for proper joint functioning as indicated by patients Functional Deficits. []? Progressing: []? Met: []? Not Met: []? Adjusted       3. Patient will demonstrate an increase in strength B UE to 4/5 grossly to allow for proper functional mobility as indicated by patients Functional Deficits. []? Progressing: []? Met: []? Not Met: []? Adjusted       4. Patient will return to all transfers, work activities, and functional activities without increased symptoms or restriction. []? Progressing: []? Met: []? Not Met: []? Adjusted       5. Patient will have 0/10 pain with ADL's.  []? Progressing: []? Met: []? Not Met: []? Adjusted       6. Patient stated goal: To be able to attend work shop and bowl without severe increase in pain. []? Progressing: []? Met: []? Not Met: []? Adjusted     Overall Progression Towards Functional goals/ Treatment Progress Update:  [] Patient is progressing as expected towards functional goals listed. [] Progression is slowed due to complexities/Impairments listed. [] Progression has been slowed due to co-morbidities.   [x] Plan just implemented, too soon to assess goals progression <30days   [] Goals require adjustment due to lack of progress  [] Patient is not progressing as expected and requires additional follow up with physician  [] Other    Prognosis for POC: [x] Good [] Fair  [] Poor    Patient requires continued skilled intervention: [x] Yes  [] No    Treatment/Activity Tolerance:  [x] Patient able to complete treatment  [] Patient limited by fatigue  [] Patient limited by pain    [] Patient limited by other medical complications  [] Other:     Return to Play: (if applicable)   []  Stage 1: Intro to Strength   []  Stage 2: Return to Run and Strength   []  Stage 3: Return to Jump and Strength   []  Stage 4: Dynamic Strength and Agility   []  Stage 5: Sport Specific Training     []  Ready to Return to Play, Meets All Above Stages   []  Not Ready for Return to Sports   Comments:                         PLAN: See eval  [x] Continue per plan of care [] Alter current plan (see comments above)  [] Plan of care initiated [] Hold pending MD visit [] Discharge    Electronically signed by:  Brent Charles, PT, MPT,ATC    Note: If patient does not return for scheduled/ recommended follow up visits, this note will serve as a discharge from care along with most recent update on progress.

## 2021-08-06 ENCOUNTER — OFFICE VISIT (OUTPATIENT)
Dept: FAMILY MEDICINE CLINIC | Age: 46
End: 2021-08-06
Payer: MEDICARE

## 2021-08-06 VITALS
HEART RATE: 83 BPM | OXYGEN SATURATION: 95 % | SYSTOLIC BLOOD PRESSURE: 110 MMHG | RESPIRATION RATE: 16 BRPM | HEIGHT: 59 IN | DIASTOLIC BLOOD PRESSURE: 68 MMHG | BODY MASS INDEX: 29.88 KG/M2 | TEMPERATURE: 98.5 F | WEIGHT: 148.2 LBS

## 2021-08-06 DIAGNOSIS — M75.42 ROTATOR CUFF IMPINGEMENT SYNDROME OF LEFT SHOULDER: Primary | ICD-10-CM

## 2021-08-06 PROCEDURE — G8427 DOCREV CUR MEDS BY ELIG CLIN: HCPCS | Performed by: FAMILY MEDICINE

## 2021-08-06 PROCEDURE — G8417 CALC BMI ABV UP PARAM F/U: HCPCS | Performed by: FAMILY MEDICINE

## 2021-08-06 PROCEDURE — 1036F TOBACCO NON-USER: CPT | Performed by: FAMILY MEDICINE

## 2021-08-06 PROCEDURE — 99213 OFFICE O/P EST LOW 20 MIN: CPT | Performed by: FAMILY MEDICINE

## 2021-08-06 RX ORDER — MELOXICAM 15 MG/1
15 TABLET ORAL DAILY
Qty: 30 TABLET | Refills: 5 | Status: SHIPPED | OUTPATIENT
Start: 2021-08-06 | End: 2022-06-04

## 2021-08-06 SDOH — ECONOMIC STABILITY: FOOD INSECURITY: WITHIN THE PAST 12 MONTHS, YOU WORRIED THAT YOUR FOOD WOULD RUN OUT BEFORE YOU GOT MONEY TO BUY MORE.: NEVER TRUE

## 2021-08-06 SDOH — ECONOMIC STABILITY: FOOD INSECURITY: WITHIN THE PAST 12 MONTHS, THE FOOD YOU BOUGHT JUST DIDN'T LAST AND YOU DIDN'T HAVE MONEY TO GET MORE.: NEVER TRUE

## 2021-08-06 ASSESSMENT — SOCIAL DETERMINANTS OF HEALTH (SDOH): HOW HARD IS IT FOR YOU TO PAY FOR THE VERY BASICS LIKE FOOD, HOUSING, MEDICAL CARE, AND HEATING?: NOT HARD AT ALL

## 2021-08-06 NOTE — PATIENT INSTRUCTIONS
Take 15 mg Meloxicam daily for 2 weeks along with the therapy. If not better in 2 weeks we will order an MRI. Patient should call the office immediately with new or ongoing signs or symptoms or worsening, or proceed to the emergency room. If you are on medications which could impair your senses, you are at risk of weakness, falls, dizziness, or drowsiness. You should be careful during activities which could place you at risk of harm, such as climbing, using stairs, operating machinery, or driving vehicles. If you feel you cannot safely do these activities, you should request others to help you, or avoid the activities altogether. If you are drowsy for any other reason, you should use the same precautions as listed above. Web Address for Advance Directive:    [email or web address]     Scheduling a Covid Vaccine:    Website: [email or web address]. [place].gov. [place]    Call: [phone] ([phone])    The medication list included in this document is our record of what you are currently taking, including any changes that were made at today's visit. If you find any differences when compared to your medications at home, or have any questions that were not answered at your visit, please contact the office. We know that all of us get many calls or texts from unrecognized numbers. All of us are concerned about spam and inappropriate attempts to reach us. The systemwide SMS short code 5 digit number listed below will show up when you get an appointment reminder by text. The phone number below will show up when you are receiving an appointment reminder call from [facility]).       System-Wide SMS Short Code: [id number]  [facility]) Appointment Reminders: [phone]

## 2021-08-06 NOTE — PROGRESS NOTES
Chief Complaint   Patient presents with    1 Month Follow-Up    Shoulder Pain     Left shoulder follow up. Pt seen Physical therapy twice. And doing at home exercises. Pt is still having lots of discomfort.  Rash     comes and goes. States not now an issue.  Other     pt has multiple medical problems         Internal Administration   First Dose      Second Dose           Last COVID Lab No results found for: SARS-COV-2, SARS-COV-2 RNA, SARS-COV-2, SARS-COV-2, SARS-COV-2 BY PCR, SARS-COV-2, SARS-COV-2, SARS-COV-2          Wt Readings from Last 3 Encounters:   21 148 lb 3.2 oz (67.2 kg)   21 150 lb (68 kg)   21 146 lb (66.2 kg)     BP Readings from Last 3 Encounters:   21 108/78   21 122/79   21 110/64      Lab Results   Component Value Date    LABA1C 5.4 2021       HPI:  Patrecia Kocher is a 42 y.o. (: 1975) here today for a 1 month follow up on her left shoulder pain. She is still having a lot of discomfort. She has had two physical therapy sessions and is doing at home exercises. This has not helped at all. The pain is not constant and only hurts when she moves it. Discussed taking 15 mg Meloxicam daily for 2 weeks along with doing the therapy and if not better in 2 weeks we will do an MRI. [x] Patient has completed an advance directive  [] Patient has NOT completed an advanced directive  [x] Patient has a documented healthcare surrogate  [] Patient does NOT have a documented healthcare surrogate  [] Discussed the importance of establishing and updating an advanced directive. Patient has questions at this time and those were answered. [x] Discussed the importance of establishing and updating an advanced directive. Patient does NOT have questions at this time.     Discussed with: [] Patient            [x] Family             [] Other caregiver    Patient's medications, allergies, past medical, surgical, social and family histories were reviewed and updated asappropriate on 8/6/2021 at 2:20 PM.    ROS:  Review of Systems    All other systems reviewed and are negative except as noted above on 8/6/2021 at 2:20 PM. Additional review of systems may be scanned into the media section ofthis medical record. Any responses requiring further intervention were pursued. Past Medical History:   Diagnosis Date    COVID-19 12/28/2020    Down syndrome     Influenza A 03/10/2020     Family History   Problem Relation Age of Onset    Arthritis Mother     Cancer Mother     Diabetes Mother     High Blood Pressure Mother     High Cholesterol Mother     Arthritis Father     Hearing Loss Father     Heart Disease Father     High Blood Pressure Father     High Cholesterol Father     Stroke Father     Arthritis Sister     Cancer Sister     Asthma Maternal Grandmother     Early Death Maternal Grandfather      Social History     Socioeconomic History    Marital status: Single     Spouse name: Not on file    Number of children: Not on file    Years of education: Not on file    Highest education level: Not on file   Occupational History    Not on file   Tobacco Use    Smoking status: Never Smoker    Smokeless tobacco: Never Used   Substance and Sexual Activity    Alcohol use: No    Drug use: No    Sexual activity: Not on file   Other Topics Concern    Not on file   Social History Narrative    Not on file     Social Determinants of Health     Financial Resource Strain: Low Risk     Difficulty of Paying Living Expenses: Not hard at all   Food Insecurity: No Food Insecurity    Worried About [address] in the Last Year: Never true    [address] in the Last Year: Never true   Transportation Needs:     Lack of Transportation (Medical):      Lack of Transportation (Non-Medical):    Physical Activity:     Days of Exercise per Week:     Minutes of Exercise per Session:    Stress:     Feeling of Stress :    Social Connections:     Frequency of Communication with Friends and Family:     Frequency of Social Gatherings with Friends and Family:     Attends Bahai Services:     Active Member of Clubs or Organizations:     Attends Club or Organization Meetings:     Marital Status:    Intimate Partner Violence:     Fear of Current or Ex-Partner:     Emotionally Abused:     Physically Abused:     Sexually Abused:      Prior to Visit Medications    Medication Sig Taking? Authorizing Provider   diclofenac sodium (VOLTAREN) 1 % GEL Apply 2 g topically 2 times daily as needed for Pain Yes Historical Provider, MD   MedroxyPROGESTERone Acetate (DEPO-PROVERA IM) Inject  into the muscle See Admin Instructions. Every three months  Yes Historical Provider, MD   Calcium Citrate-Vitamin D 200-200 MG-UNIT TABS Take  by mouth daily. Yes Historical Provider, MD     No Known Allergies    OBJECTIVE:  Estimated body mass index is 29.93 kg/m² as calculated from the following:    Height as of this encounter: 4' 11\" (1.499 m). Weight as of this encounter: 148 lb 3.2 oz (67.2 kg). Vitals:    08/06/21 1418   Resp: 16   Weight: 148 lb 3.2 oz (67.2 kg)   Height: 4' 11\" (1.499 m)       Physical Exam  Vitals and nursing note reviewed. Constitutional:       General: She is not in acute distress. Appearance: She is well-developed. She is not diaphoretic. HENT:      Head: Normocephalic and atraumatic. Right Ear: External ear normal.      Left Ear: External ear normal.      Nose: Nose normal.   Eyes:      General: Lids are normal. No scleral icterus. Right eye: No discharge. Left eye: No discharge. Pupils: Pupils are equal, round, and reactive to light. Neck:      Thyroid: No thyromegaly. Vascular: No JVD. Cardiovascular:      Rate and Rhythm: Normal rate and regular rhythm. Heart sounds: Normal heart sounds. Pulmonary:      Effort: Pulmonary effort is normal. No respiratory distress. Breath sounds: Normal breath sounds. Abdominal:      Palpations: Abdomen is soft. There is no hepatomegaly or splenomegaly. Tenderness: There is no abdominal tenderness. Musculoskeletal:      Left shoulder: Decreased range of motion (80 degree abduction today compared to 70 degrees previously). Comments: Patient appears to be significantly more tender on palpation of the shoulder compared to last visit   Skin:     General: Skin is warm and dry. Coloration: Skin is not pale. Findings: No erythema or rash. Comments: Turgor normal   Psychiatric:         Behavior: Behavior normal.         Thought Content: Thought content normal.         Judgment: Judgment normal.              ASSESSMENT PLAN      Diagnosis Orders   1. Rotator cuff impingement syndrome of left shoulder     Patient still having significant pain. Is completed 2 therapy sessions. Begin meloxicam 15 mg daily. Continue therapy. Call 2 weeks with progress, if no improvement at that time will order MRI of the left shoulder. Patient should call the office immediately with new or ongoing signs or symptoms or worsening, or proceed to the emergency room. No changes in past medical history, past surgical history, social history, or family history were noted during the patient encounter unless specifically listed above. All updates of past medical history, past surgical history, social history, or family history were reviewed personally by me during the office visit. All problems listed in the assessment are stable unless noted otherwise. Medication profile reviewed personally by me during the visit. Medication side effects and possible impairments from medications were discussed as applicable. This document was prepared by a combination of typing and transcription through a voice recognition software.                 Scribe attestation: Naga Zhong MA, am scribing for and in the presence of Asha Zarco MD. Electronically signed by Leatha Warner MA on 8/6/2021 at 2:20 PM      Provider attestation:     I, Dr. Anup Andres, personally performed the services described in this documentation, as scribed by the above signed scribe in my presence, and it is both accurate and complete. I agree with the ROS and Past Histories independently gathered by the clinical support staff and the remaining scribed note accurately describes my personal service to the patient.       8/6/2021    2:41 PM

## 2021-08-10 ENCOUNTER — HOSPITAL ENCOUNTER (OUTPATIENT)
Dept: PHYSICAL THERAPY | Age: 46
Setting detail: THERAPIES SERIES
Discharge: HOME | End: 2021-08-10
Payer: MEDICARE

## 2021-08-10 PROCEDURE — 97112 NEUROMUSCULAR REEDUCATION: CPT

## 2021-08-10 PROCEDURE — 97140 MANUAL THERAPY 1/> REGIONS: CPT

## 2021-08-10 PROCEDURE — 97110 THERAPEUTIC EXERCISES: CPT

## 2021-08-10 NOTE — FLOWSHEET NOTE
Supine Horizontal Abduciton 10 x 2              Postural Ed for Father and patient  8'                                                 Manual Intervention (16161)     PROM  Shoulder 10 mins                             Medbridge access code:   Access Code: [email or web address]  URL: [email or web address]  Date: 07/26/2021  Prepared by: Fausto Ramos    Exercises  Seated Shoulder Shrugs - 1 x daily - 7 x weekly - 3 sets - 10 reps  Standing Bicep Curls Neutral with Dumbbells - 1 x daily - 7 x weekly - 3 sets - 10 reps  Seated Shoulder External Rotation - 1 x daily - 7 x weekly - 3 sets - 10 reps  Standing shoulder flexion wall slides - 1 x daily - 7 x weekly - 3 sets - 10 reps                    Patient Education 10' Pt education with HEP and progression of PT along with compliance with HEP to aide with formal PT for optimal outcomes. Therapeutic Exercise and NMR EXR  [x] (28204) Provided verbal/tactile cueing for activities related to strengthening, flexibility, endurance, ROM  for improvements in scapular, scapulothoracic and UE control with self care, reaching, carrying, lifting, house/yardwork, driving/computer work. [x] (48270) Provided verbal/tactile cueing for activities related to improving balance, coordination, kinesthetic sense, posture, motor skill, proprioception  to assist with  scapular, scapulothoracic and UE control with self care, reaching, carrying, lifting, house/yardwork, driving/computer work. Therapeutic Activities:    [x] (31513 or 56540) Provided verbal/tactile cueing for activities related to improving balance, coordination, kinesthetic sense, posture, motor skill, proprioception and motor activation to allow for proper function of scapular, scapulothoracic and UE control with self care, carrying, lifting, driving/computer work.      Home Exercise Program:    [x] (99989) Reviewed/Progressed HEP activities related to strengthening, flexibility, endurance, ROM of scapular, scapulothoracic and UE control with self care, reaching, carrying, lifting, house/yardwork, driving/computer work  [x] (50419) Reviewed/Progressed HEP activities related to improving balance, coordination, kinesthetic sense, posture, motor skill, proprioception of scapular, scapulothoracic and UE control with self care, reaching, carrying, lifting, house/yardwork, driving/computer work      Manual Treatments:  PROM / STM / Oscillations-Mobs:  G-I, II, III, IV (PA's, Inf., Post.)  [x] (03680) Provided manual therapy to mobilize soft tissue/joints of cervical/CT, scapular GHJ and UE for the purpose of modulating pain, promoting relaxation,  increasing ROM, reducing/eliminating soft tissue swelling/inflammation/restriction, improving soft tissue extensibility and allowing for proper ROM for normal function with self care, reaching, carrying, lifting, house/yardwork, driving/computer work    Modalities:  MHP x 10'     Charges:  Timed Code Treatment Minutes: 39'   Total Treatment Minutes:  55'   BWC:  TE TIME:  NMR TIME:  MANUAL TIME:  UNTIMED MINUTES:  Medicare Total:   -  -  -  -  Visit 3 -- total $ 300       [] EVAL (LOW) 43859 (typically 20 minutes face-to-face)  [] EVAL (MOD) 16179 (typically 30 minutes face-to-face)  [] EVAL (HIGH) 423 8935 (typically 45 minutes face-to-face)  [] RE-EVAL     [x] LS(70834) x  1   [] IONTO  [x] NMR (61175) x 1    [] VASO  [x] Manual (22994) x 1    [] Other:  [] TA x      [] [facility] (33256)  [] ES(attended) (87377)     [] ES (un) (38214):    ASSESSMENT:  See eval    GOALS:   Short Term Goals: To be achieved in: 2 weeks  1. Independent in HEP and progression per patient tolerance, in order to prevent re-injury. []? Progressing: []? Met: []? Not Met: []? Adjusted       2. Patient will have a decrease in pain to facilitate improvement in movement, function, and ADLs as indicated by Functional Deficits. []? Progressing: []? Met: []? Not Met: []? Adjusted          Long Term Goals:  To be achieved in: 12 weeks  1. Disability index score of 20% or less for the Quick Dash 11  to assist with reaching prior level of function. []? Progressing: []? Met: []? Not Met: []? Adjusted      2. Patient will demonstrate increased AROM to equal the opposite side bilaterally to allow for proper joint functioning as indicated by patients Functional Deficits. []? Progressing: []? Met: []? Not Met: []? Adjusted       3. Patient will demonstrate an increase in strength B UE to 4/5 grossly to allow for proper functional mobility as indicated by patients Functional Deficits. []? Progressing: []? Met: []? Not Met: []? Adjusted       4. Patient will return to all transfers, work activities, and functional activities without increased symptoms or restriction. []? Progressing: []? Met: []? Not Met: []? Adjusted       5. Patient will have 0/10 pain with ADL's.  []? Progressing: []? Met: []? Not Met: []? Adjusted       6. Patient stated goal: To be able to attend work shop and bowl without severe increase in pain. []? Progressing: []? Met: []? Not Met: []? Adjusted     Overall Progression Towards Functional goals/ Treatment Progress Update:  [] Patient is progressing as expected towards functional goals listed. [] Progression is slowed due to complexities/Impairments listed. [] Progression has been slowed due to co-morbidities.   [x] Plan just implemented, too soon to assess goals progression <30days   [] Goals require adjustment due to lack of progress  [] Patient is not progressing as expected and requires additional follow up with physician  [] Other    Prognosis for POC: [x] Good [] Fair  [] Poor    Patient requires continued skilled intervention: [x] Yes  [] No    Treatment/Activity Tolerance:  [x] Patient able to complete treatment  [] Patient limited by fatigue  [] Patient limited by pain    [] Patient limited by other medical complications  [] Other:     Return to Play: (if applicable)   []  Stage 1: Intro to Strength   []  Stage 2: Return to Run and Strength   []  Stage 3: Return to Jump and Strength   []  Stage 4: Dynamic Strength and Agility   []  Stage 5: Sport Specific Training     []  Ready to Return to Play, Meets All Above Stages   []  Not Ready for Return to Sports   Comments:                         PLAN: See eval  [x] Continue per plan of care [] Alter current plan (see comments above)  [] Plan of care initiated [] Hold pending MD visit [] Discharge    Electronically signed by:  She Murray, PT, MPT,ATC    Note: If patient does not return for scheduled/ recommended follow up visits, this note will serve as a discharge from care along with most recent update on progress.

## 2021-08-11 ENCOUNTER — APPOINTMENT (OUTPATIENT)
Dept: PHYSICAL THERAPY | Age: 46
End: 2021-08-11
Payer: MEDICARE

## 2021-08-18 ENCOUNTER — HOSPITAL ENCOUNTER (OUTPATIENT)
Dept: PHYSICAL THERAPY | Age: 46
Setting detail: THERAPIES SERIES
Discharge: HOME | End: 2021-08-18
Payer: MEDICARE

## 2021-08-18 PROCEDURE — 97110 THERAPEUTIC EXERCISES: CPT

## 2021-08-18 PROCEDURE — 97112 NEUROMUSCULAR REEDUCATION: CPT

## 2021-08-18 PROCEDURE — 97140 MANUAL THERAPY 1/> REGIONS: CPT

## 2021-08-18 NOTE — FLOWSHEET NOTE
[facility], [address] [place] [address] Timmy Willard, [address]  Phone: [phone], [phone]    Physical Therapy Treatment Note/ Progress Report:     Date:  2021    Patient Name:  Willow Steele    :  1975  MRN: [id number]  Restrictions/Precautions:    Medical/Treatment Diagnosis Information:  · Diagnosis: Left Shoulder Rotator Cuff Impingement  · Treatment Diagnosis: T61.72  Insurance/Certification information:  PT Insurance Information: Medicare/Medicaid  Physician Information:  Referring Practitioner: Myra Johnson  Has the plan of care been signed (Y/N):        []  Yes  [x]  No     Date of Patient follow up with Physician:     Is this a Progress Report:     []  Yes  [x]  No      If Yes:  Date Range for reporting period:  Initial Eval: 2021  Beginnin2021 --- Endin2021    Progress report will be due (10 Rx or 30 days whichever is less): 3/09/5818     Recertification will be due (POC Duration  / 90 days whichever is less): 10/28/2021      Visit # Insurance Allowable Auth Required   In Person [facility] []  Yes     []  No    Tele Health -  []  Yes     []  No    Total 4         Functional Scale: Quick Dash: 32% (Total Number Sum: 55)   Date assessed: 2021     Latex Allergy:  [x]NO      []YES  Preferred Language for Healthcare:   [x]English       []other:    Pain level:  2/10 current; 8/10 at worst with activity    SUBJECTIVE:  Pt's father reports noting patietn reporting feeling better more often     OBJECTIVE: See eval   Observation:    Test measurements:      RESTRICTIONS/PRECAUTIONS:   Exercises/Interventions: (All TE's required max Verbal and visual cues)  Therapeutic Ex (55639)  Therapeutic Activity (87890)  NMR re-education (58043) Sets/Reps Notes/CUES   Pulley 3'              Shrug with Scap Squeeze 10 x 2    B Shoulder ER with Scap Squeeze 10 x 2     Wall Flexion 15 x     Elbow Flex/Ext 10 x 2     Wall Push Up 3'         TBand Rows  20 x Supine Cane Flex 10 x     Supine [employer] 10 x 2    Supine Cane Punch 10 x 2    Supine Horizontal Abduciton 10 x 2              Postural Ed for Father and patient  8'         SL ER/Flex/Abd 15 x ea                                       Manual Intervention ([address] [facility])     PROM  Shoulder 10 mins                             Medbridge access code:   Access Code: [email or web address]  URL: [email or web address]  Date: 07/26/2021  Prepared by: Naresh Flowers    Exercises  Seated Shoulder Shrugs - 1 x daily - 7 x weekly - 3 sets - 10 reps  Standing Bicep Curls Neutral with Dumbbells - 1 x daily - 7 x weekly - 3 sets - 10 reps  Seated Shoulder External Rotation - 1 x daily - 7 x weekly - 3 sets - 10 reps  Standing shoulder flexion wall slides - 1 x daily - 7 x weekly - 3 sets - 10 reps                    Patient Education 10' Pt education with HEP and progression of PT along with compliance with HEP to aide with formal PT for optimal outcomes. Therapeutic Exercise and NMR EXR  [x] (14941) Provided verbal/tactile cueing for activities related to strengthening, flexibility, endurance, ROM  for improvements in scapular, scapulothoracic and UE control with self care, reaching, carrying, lifting, house/yardwork, driving/computer work. [x] (05980) Provided verbal/tactile cueing for activities related to improving balance, coordination, kinesthetic sense, posture, motor skill, proprioception  to assist with  scapular, scapulothoracic and UE control with self care, reaching, carrying, lifting, house/yardwork, driving/computer work. Therapeutic Activities:    [x] (76746 or 77627) Provided verbal/tactile cueing for activities related to improving balance, coordination, kinesthetic sense, posture, motor skill, proprioception and motor activation to allow for proper function of scapular, scapulothoracic and UE control with self care, carrying, lifting, driving/computer work.      Home Exercise Program:    [x] (02960) Reviewed/Progressed HEP activities related to strengthening, flexibility, endurance, ROM of scapular, scapulothoracic and UE control with self care, reaching, carrying, lifting, house/yardwork, driving/computer work  [x] (49201) Reviewed/Progressed HEP activities related to improving balance, coordination, kinesthetic sense, posture, motor skill, proprioception of scapular, scapulothoracic and UE control with self care, reaching, carrying, lifting, house/yardwork, driving/computer work      Manual Treatments:  PROM / STM / Oscillations-Mobs:  G-I, II, III, IV (PA's, Inf., Post.)  [x] (10218) Provided manual therapy to mobilize soft tissue/joints of cervical/CT, scapular GHJ and UE for the purpose of modulating pain, promoting relaxation,  increasing ROM, reducing/eliminating soft tissue swelling/inflammation/restriction, improving soft tissue extensibility and allowing for proper ROM for normal function with self care, reaching, carrying, lifting, house/yardwork, driving/computer work    Modalities:  MHP x 10'     Charges:  Timed Code Treatment Minutes: 45'   Total Treatment Minutes:  55'   BWC:  TE TIME:  NMR TIME:  MANUAL TIME:  UNTIMED MINUTES:  Medicare Total:   -  -  -  -  Visit 4 -- total $ 400       [] EVAL (LOW) 17765 (typically 20 minutes face-to-face)  [] EVAL (MOD) 73006 (typically 30 minutes face-to-face)  [] EVAL (HIGH) 15784 (typically 45 minutes face-to-face)  [] RE-EVAL     [x] OC(45241) x  1   [] IONTO  [x] NMR (62665) x 1    [] VASO  [x] Manual (27614) x 1    [] Other:  [] TA x      [] Mech Traction (43430)  [] ES(attended) (77617)     [] ES (un) (20539):    ASSESSMENT:  See eval    GOALS:   Short Term Goals: To be achieved in: 2 weeks  1. Independent in HEP and progression per patient tolerance, in order to prevent re-injury. []? Progressing: []? Met: []? Not Met: []? Adjusted       2.  Patient will have a decrease in pain to facilitate improvement in movement, function, and ADLs as indicated by Functional Deficits. []? Progressing: []? Met: []? Not Met: []? Adjusted          Long Term Goals: To be achieved in: 12 weeks  1. Disability index score of 20% or less for the Quick Dash 11  to assist with reaching prior level of function. []? Progressing: []? Met: []? Not Met: []? Adjusted      2. Patient will demonstrate increased AROM to equal the opposite side bilaterally to allow for proper joint functioning as indicated by patients Functional Deficits. []? Progressing: []? Met: []? Not Met: []? Adjusted       3. Patient will demonstrate an increase in strength B UE to 4/5 grossly to allow for proper functional mobility as indicated by patients Functional Deficits. []? Progressing: []? Met: []? Not Met: []? Adjusted       4. Patient will return to all transfers, work activities, and functional activities without increased symptoms or restriction. []? Progressing: []? Met: []? Not Met: []? Adjusted       5. Patient will have 0/10 pain with ADL's.  []? Progressing: []? Met: []? Not Met: []? Adjusted       6. Patient stated goal: To be able to attend work shop and bowl without severe increase in pain. []? Progressing: []? Met: []? Not Met: []? Adjusted     Overall Progression Towards Functional goals/ Treatment Progress Update:  [] Patient is progressing as expected towards functional goals listed. [] Progression is slowed due to complexities/Impairments listed. [] Progression has been slowed due to co-morbidities.   [x] Plan just implemented, too soon to assess goals progression <30days   [] Goals require adjustment due to lack of progress  [] Patient is not progressing as expected and requires additional follow up with physician  [] Other    Prognosis for POC: [x] Good [] Fair  [] Poor    Patient requires continued skilled intervention: [x] Yes  [] No    Treatment/Activity Tolerance:  [x] Patient able to complete treatment  [] Patient limited by fatigue  [] Patient limited by pain    [] Patient

## 2021-08-25 ENCOUNTER — HOSPITAL ENCOUNTER (OUTPATIENT)
Dept: PHYSICAL THERAPY | Age: 46
Setting detail: THERAPIES SERIES
Discharge: HOME | End: 2021-08-25
Payer: MEDICARE

## 2021-08-25 ENCOUNTER — APPOINTMENT (OUTPATIENT)
Dept: PHYSICAL THERAPY | Age: 46
End: 2021-08-25
Payer: MEDICARE

## 2021-08-25 PROCEDURE — 97140 MANUAL THERAPY 1/> REGIONS: CPT

## 2021-08-25 PROCEDURE — 97112 NEUROMUSCULAR REEDUCATION: CPT

## 2021-08-25 PROCEDURE — 97110 THERAPEUTIC EXERCISES: CPT

## 2021-08-25 PROCEDURE — 97530 THERAPEUTIC ACTIVITIES: CPT

## 2021-08-25 NOTE — FLOWSHEET NOTE
x 2    Supine Cane Punch 10 x 2    Supine Horizontal Abduciton 10 x 2              Postural Ed for Father and patient  8'         SL ER/Flex/Abd 15 x ea                                       Manual Intervention ([phone])     PROM  Shoulder 10 mins                             Medbridge access code:   Access Code: [email or web address]  URL: [email or web address]  Date: 07/26/2021  Prepared by: Lizzy Leap    Exercises  Seated Shoulder Shrugs - 1 x daily - 7 x weekly - 3 sets - 10 reps  Standing Bicep Curls Neutral with Dumbbells - 1 x daily - 7 x weekly - 3 sets - 10 reps  Seated Shoulder External Rotation - 1 x daily - 7 x weekly - 3 sets - 10 reps  Standing shoulder flexion wall slides - 1 x daily - 7 x weekly - 3 sets - 10 reps                    Patient Education 10' Pt education with HEP and progression of PT along with compliance with HEP to aide with formal PT for optimal outcomes. Therapeutic Exercise and NMR EXR  [x] (33578) Provided verbal/tactile cueing for activities related to strengthening, flexibility, endurance, ROM  for improvements in scapular, scapulothoracic and UE control with self care, reaching, carrying, lifting, house/yardwork, driving/computer work. [x] (61894) Provided verbal/tactile cueing for activities related to improving balance, coordination, kinesthetic sense, posture, motor skill, proprioception  to assist with  scapular, scapulothoracic and UE control with self care, reaching, carrying, lifting, house/yardwork, driving/computer work. Therapeutic Activities:    [x] (68816 or 58972) Provided verbal/tactile cueing for activities related to improving balance, coordination, kinesthetic sense, posture, motor skill, proprioception and motor activation to allow for proper function of scapular, scapulothoracic and UE control with self care, carrying, lifting, driving/computer work.      Home Exercise Program:    [x] (89950) Reviewed/Progressed HEP activities related to strengthening, flexibility, endurance, ROM of scapular, scapulothoracic and UE control with self care, reaching, carrying, lifting, house/yardwork, driving/computer work  [x] (64161) Reviewed/Progressed HEP activities related to improving balance, coordination, kinesthetic sense, posture, motor skill, proprioception of scapular, scapulothoracic and UE control with self care, reaching, carrying, lifting, house/yardwork, driving/computer work      Manual Treatments:  PROM / STM / Oscillations-Mobs:  G-I, II, III, IV (PA's, Inf., Post.)  [x] (96533) Provided manual therapy to mobilize soft tissue/joints of cervical/CT, scapular GHJ and UE for the purpose of modulating pain, promoting relaxation,  increasing ROM, reducing/eliminating soft tissue swelling/inflammation/restriction, improving soft tissue extensibility and allowing for proper ROM for normal function with self care, reaching, carrying, lifting, house/yardwork, driving/computer work    Modalities:  [facility] x 10'     Charges:  Timed Code Treatment Minutes: 54'   Total Treatment Minutes:  65'   BWC:  TE TIME:  NMR TIME:  MANUAL TIME:  UNTIMED MINUTES:  Medicare Total:   -  -  -  -  Visit 5 -- total $ 530       [] EVAL (LOW) 66863 (typically 20 minutes face-to-face)  [] EVAL (MOD) 98161 (typically 30 minutes face-to-face)  [] EVAL (HIGH) 05287 (typically 45 minutes face-to-face)  [] RE-EVAL     [x] ON(16477) x  1   [] IONTO  [x] NMR (84662) x 1    [] VASO  [x] Manual (00804) x 1    [] Other:  [x] TA x 1     [] Mech Traction (45955)  [] ES(attended) (17362)     [] ES (un) (05649):    ASSESSMENT:  See eval    GOALS:   Short Term Goals: To be achieved in: 2 weeks  1. Independent in HEP and progression per patient tolerance, in order to prevent re-injury. [x]? Progressing: []? Met: []? Not Met: []? Adjusted       2. Patient will have a decrease in pain to facilitate improvement in movement, function, and ADLs as indicated by Functional Deficits. [x]?  Progressing: []? Met: []? Not Met: []? Adjusted          Long Term Goals: To be achieved in: 12 weeks  1. Disability index score of 20% or less for the Quick Dash 11  to assist with reaching prior level of function. [x]? Progressing: []? Met: []? Not Met: []? Adjusted      2. Patient will demonstrate increased AROM to equal the opposite side bilaterally to allow for proper joint functioning as indicated by patients Functional Deficits. [x]? Progressing: []? Met: []? Not Met: []? Adjusted       3. Patient will demonstrate an increase in strength B UE to 4/5 grossly to allow for proper functional mobility as indicated by patients Functional Deficits. [x]? Progressing: []? Met: []? Not Met: []? Adjusted       4. Patient will return to all transfers, work activities, and functional activities without increased symptoms or restriction. [x]? Progressing: []? Met: []? Not Met: []? Adjusted       5. Patient will have 0/10 pain with ADL's. [x]? Progressing: []? Met: []? Not Met: []? Adjusted       6. Patient stated goal: To be able to attend work shop and bowl without severe increase in pain. [x]? Progressing: []? Met: []? Not Met: []? Adjusted     Overall Progression Towards Functional goals/ Treatment Progress Update:  [] Patient is progressing as expected towards functional goals listed. [] Progression is slowed due to complexities/Impairments listed. [] Progression has been slowed due to co-morbidities.   [x] Plan just implemented, too soon to assess goals progression <30days   [] Goals require adjustment due to lack of progress  [] Patient is not progressing as expected and requires additional follow up with physician  [] Other    Prognosis for POC: [x] Good [] Fair  [] Poor    Patient requires continued skilled intervention: [x] Yes  [] No    Treatment/Activity Tolerance:  [x] Patient able to complete treatment  [] Patient limited by fatigue  [] Patient limited by pain    [] Patient limited by other medical

## 2021-09-01 ENCOUNTER — HOSPITAL ENCOUNTER (OUTPATIENT)
Dept: PHYSICAL THERAPY | Age: 46
Setting detail: THERAPIES SERIES
Discharge: HOME | End: 2021-09-01
Payer: MEDICARE

## 2021-09-01 PROCEDURE — 97140 MANUAL THERAPY 1/> REGIONS: CPT

## 2021-09-01 PROCEDURE — 97112 NEUROMUSCULAR REEDUCATION: CPT

## 2021-09-01 PROCEDURE — 97530 THERAPEUTIC ACTIVITIES: CPT

## 2021-09-01 PROCEDURE — 97110 THERAPEUTIC EXERCISES: CPT

## 2021-09-01 NOTE — FLOWSHEET NOTE
[facility], [address] [place] [address] Timmy Willard, [address]  Phone: [phone], [phone]          Physical Therapy Treatment Note/ Progress Report:     Date:  2021    Patient Name:  Rosey Yancey    :  1975  MRN: [id number]  Restrictions/Precautions:    Medical/Treatment Diagnosis Information:  · Diagnosis: Left Shoulder Rotator Cuff Impingement  · Treatment Diagnosis: G14.02  Insurance/Certification information:  PT Insurance Information: Medicare/Medicaid  Physician Information:  Referring Practitioner: [place]  Has the plan of care been signed (Y/N):        []  Yes  [x]  No     Date of Patient follow up with Physician:     Is this a Progress Report:     []  Yes  [x]  No      If Yes:  Date Range for reporting period:  Initial Eval: 2021  Beginnin2021 --- Endin2021  Beginnin2021 --- Endin2021        Progress report will be due (10 Rx or 30 days whichever is less):      Recertification will be due (POC Duration  / 90 days whichever is less): 10/28/2021      Visit # Insurance Allowable Auth Required   In Person [address] []  Yes     []  No    Tele Health -  []  Yes     []  No    Total 6         Functional Scale: Quick Dash: 32% (Total Number Sum: 25)   Date assessed: 2021   Functional Scale: Quick Dash: 20% (Total Number Sum: )   Date assessed: 2021       Latex Allergy:  [x]NO      []YES  Preferred Language for Healthcare:   [x]English       []other:    Pain level:  2/10 current    SUBJECTIVE: Pt, pt father, and pts sister reports improvement and doing well     OBJECTIVE: See eval   Observation:    Test measurements:      RESTRICTIONS/PRECAUTIONS:   Exercises/Interventions: (All TE's required max Verbal and visual cues)  Therapeutic Ex (32317)  Therapeutic Activity (15247)  NMR re-education (48961) Sets/Reps Notes/CUES   P[place]ley 3'              Shrug with Scap Squeeze 10 x 2    B Shoulder ER with Scap Squeeze 10 x 2 Wall Flexion 15 x     Elbow Flex/Ext 10 x 2     Wall Push Up 3'         TBand Rows  20 x               Supine Cane Flex 10 x     Supine [employer] 10 x 2    Supine Cane Punch 10 x 2    Supine Horizontal Abduciton 10 x 2              Postural Ed for Father and patient  8'         SL ER/Flex/Abd 15 x ea                                       Manual Intervention ([id number])     PROM  Shoulder 10 mins                             Medbridge access code:   Access Code: [email or web address]  URL: [email or web address]  Date: 07/26/2021  Prepared by: Brijesh Hernandez    Exercises  Seated Shoulder Shrugs - 1 x daily - 7 x weekly - 3 sets - 10 reps  Standing Bicep Curls Neutral with Dumbbells - 1 x daily - 7 x weekly - 3 sets - 10 reps  Seated Shoulder External Rotation - 1 x daily - 7 x weekly - 3 sets - 10 reps  Standing shoulder flexion wall slides - 1 x daily - 7 x weekly - 3 sets - 10 reps                    Patient Education 10' Pt education with HEP and progression of PT along with compliance with HEP to aide with formal PT for optimal outcomes. Therapeutic Exercise and NMR EXR  [x] (92860) Provided verbal/tactile cueing for activities related to strengthening, flexibility, endurance, ROM  for improvements in scapular, scapulothoracic and UE control with self care, reaching, carrying, lifting, house/yardwork, driving/computer work. [x] (42303) Provided verbal/tactile cueing for activities related to improving balance, coordination, kinesthetic sense, posture, motor skill, proprioception  to assist with  scapular, scapulothoracic and UE control with self care, reaching, carrying, lifting, house/yardwork, driving/computer work.     Therapeutic Activities:    [x] (04560 or 01808) Provided verbal/tactile cueing for activities related to improving balance, coordination, kinesthetic sense, posture, motor skill, proprioception and motor activation to allow for proper function of scapular, scapulothoracic and UE control with self care, carrying, lifting, driving/computer work. Home Exercise Program:    [x] (49452) Reviewed/Progressed HEP activities related to strengthening, flexibility, endurance, ROM of scapular, scapulothoracic and UE control with self care, reaching, carrying, lifting, house/yardwork, driving/computer work  [x] (18970) Reviewed/Progressed HEP activities related to improving balance, coordination, kinesthetic sense, posture, motor skill, proprioception of scapular, scapulothoracic and UE control with self care, reaching, carrying, lifting, house/yardwork, driving/computer work      Manual Treatments:  PROM / STM / Oscillations-Mobs:  G-I, II, III, IV (PA's, Inf., Post.)  [x] (69977) Provided manual therapy to mobilize soft tissue/joints of cervical/CT, scapular GHJ and UE for the purpose of modulating pain, promoting relaxation,  increasing ROM, reducing/eliminating soft tissue swelling/inflammation/restriction, improving soft tissue extensibility and allowing for proper ROM for normal function with self care, reaching, carrying, lifting, house/yardwork, driving/computer work    Modalities:  MHP x 10'     Charges:  Timed Code Treatment Minutes: 54'   Total Treatment Minutes:  65'   BWC:  TE TIME:  NMR TIME:  MANUAL TIME:  UNTIMED MINUTES:  Medicare Total:   -  -  -  -  Visit 6 -- total $ 660       [] EVAL (LOW) 18606 (typically 20 minutes face-to-face)  [] EVAL (MOD) 72930 (typically 30 minutes face-to-face)  [] EVAL (HIGH) 56682 (typically 45 minutes face-to-face)  [] RE-EVAL     [x] VN(77069) x  1   [] IONTO  [x] NMR (53248) x 1    [] VASO  [x] Manual (09739) x 1    [] Other:  [x] TA x 1     [] Mech Traction (75791)  [] ES(attended) (83226)     [] ES (un) (56461):    ASSESSMENT:  See eval    GOALS:   Short Term Goals: To be achieved in: 2 weeks  1. Independent in HEP and progression per patient tolerance, in order to prevent re-injury. []? Progressing: [x]? Met: []? Not Met: []? Adjusted       2.  Patient will have a decrease in pain to facilitate improvement in movement, function, and ADLs as indicated by Functional Deficits. []? Progressing: [x]? Met: []? Not Met: []? Adjusted          Long Term Goals: To be achieved in: 12 weeks  1. Disability index score of 20% or less for the Quick Dash 11  to assist with reaching prior level of function. []? Progressing: [x]? Met: []? Not Met: []? Adjusted      2. Patient will demonstrate increased AROM to equal the opposite side bilaterally to allow for proper joint functioning as indicated by patients Functional Deficits. []? Progressing: [x]? Met: []? Not Met: []? Adjusted       3. Patient will demonstrate an increase in strength B UE to 4/5 grossly to allow for proper functional mobility as indicated by patients Functional Deficits. []? Progressing: [x]? Met: []? Not Met: []? Adjusted       4. Patient will return to all transfers, work activities, and functional activities without increased symptoms or restriction. []? Progressing: [x]? Met: []? Not Met: []? Adjusted       5. Patient will have 0/10 pain with ADL's.  []? Progressing: [x]? Met: []? Not Met: []? Adjusted       6. Patient stated goal: To be able to attend work shop and bowl without severe increase in pain. []? Progressing: [x]? Met: []? Not Met: []? Adjusted     Overall Progression Towards Functional goals/ Treatment Progress Update:  [] Patient is progressing as expected towards functional goals listed. [] Progression is slowed due to complexities/Impairments listed. [] Progression has been slowed due to co-morbidities.   [x] Plan just implemented, too soon to assess goals progression <30days   [] Goals require adjustment due to lack of progress  [] Patient is not progressing as expected and requires additional follow up with physician  [] Other    Prognosis for POC: [x] Good [] Fair  [] Poor    Patient requires continued skilled intervention: [x] Yes  [] No    Treatment/Activity Tolerance:  [x] Patient able to complete treatment  [] Patient limited by fatigue  [] Patient limited by pain    [] Patient limited by other medical complications  [] Other:     Return to Play: (if applicable)   []  Stage 1: Intro to Strength   []  Stage 2: Return to Run and Strength   []  Stage 3: Return to Jump and Strength   []  Stage 4: Dynamic Strength and Agility   []  Stage 5: Sport Specific Training     []  Ready to Return to Play, Meets All Above Stages   []  Not Ready for Return to Sports   Comments:                         PLAN: See eval  [] Continue per plan of care [] Alter current plan (see comments above)  [] Plan of care initiated [] Hold pending MD visit [x] Discharge    Electronically signed by:  Prudence Beck, PT, MPT,ATC    Note: If patient does not return for scheduled/ recommended follow up visits, this note will serve as a discharge from care along with most recent update on progress.

## 2021-10-22 ENCOUNTER — TELEPHONE (OUTPATIENT)
Dept: FAMILY MEDICINE CLINIC | Age: 46
End: 2021-10-22

## 2021-10-22 DIAGNOSIS — M25.512 ACUTE PAIN OF LEFT SHOULDER: Primary | ICD-10-CM

## 2021-10-22 NOTE — TELEPHONE ENCOUNTER
Pt is still having issues with her left shoulder states that the therapy did not help. Was wanting to see if could get an MRI of it .

## 2021-10-25 NOTE — TELEPHONE ENCOUNTER
MRI ordered and referral placed. TALISHA Woods informing that the MRI has been ordered and provided number to call and scheduled and also that we are referring her to an orthopedic doctor to see for her left shoulder, dr. Fidelina Bustamante and provided number to call and schedule. Informed she can call back for any additional information.

## 2021-10-25 NOTE — TELEPHONE ENCOUNTER
With our without contrast for the MRI of the left shoulder or would you like to refer patient to orthopedics?

## 2021-11-04 ENCOUNTER — TELEPHONE (OUTPATIENT)
Dept: ORTHOPEDIC SURGERY | Age: 46
End: 2021-11-04

## 2021-11-04 ENCOUNTER — OFFICE VISIT (OUTPATIENT)
Dept: ORTHOPEDIC SURGERY | Age: 46
End: 2021-11-04
Payer: MEDICARE

## 2021-11-04 ENCOUNTER — HOSPITAL ENCOUNTER (OUTPATIENT)
Dept: MRI IMAGING | Age: 46
Discharge: HOME | End: 2021-11-04
Payer: MEDICARE

## 2021-11-04 VITALS — BODY MASS INDEX: 29.84 KG/M2 | HEIGHT: 59 IN | WEIGHT: 148 LBS

## 2021-11-04 DIAGNOSIS — M25.512 LEFT SHOULDER PAIN, UNSPECIFIED CHRONICITY: ICD-10-CM

## 2021-11-04 DIAGNOSIS — M87.00 AVASCULAR NECROSIS (HCC): ICD-10-CM

## 2021-11-04 DIAGNOSIS — M25.512 LEFT SHOULDER PAIN, UNSPECIFIED CHRONICITY: Primary | ICD-10-CM

## 2021-11-04 PROCEDURE — G8484 FLU IMMUNIZE NO ADMIN: HCPCS | Performed by: ORTHOPAEDIC SURGERY

## 2021-11-04 PROCEDURE — G8417 CALC BMI ABV UP PARAM F/U: HCPCS | Performed by: ORTHOPAEDIC SURGERY

## 2021-11-04 PROCEDURE — 1036F TOBACCO NON-USER: CPT | Performed by: ORTHOPAEDIC SURGERY

## 2021-11-04 PROCEDURE — 73221 MRI JOINT UPR EXTREM W/O DYE: CPT

## 2021-11-04 PROCEDURE — 99204 OFFICE O/P NEW MOD 45 MIN: CPT | Performed by: ORTHOPAEDIC SURGERY

## 2021-11-04 PROCEDURE — G8427 DOCREV CUR MEDS BY ELIG CLIN: HCPCS | Performed by: ORTHOPAEDIC SURGERY

## 2021-11-04 NOTE — PROGRESS NOTES
Dr Marylu Hope      Date /Time 11/4/2021             12:06 PM EDT  Name Love Silva             1975   Location  [id number] [place] [address] DR ORTHOPEDIC SURG  MRN <[id number]>                Chief Complaint   Patient presents with    Shoulder Pain     LEFT SHOULDER         History of Present Illness    Love Silva is a 42 y.o. female who presents with  left Shoulder pain. Sent in consultation by Marla Hutton MD,. She is Right-handed. Occupation: Disabled  Injury Mechanism:  none. Worker's Comp. & legal issues:   none. Previous Treatments: Ice, Heat and NSAIDs    Patient presents the office today for a new problem. Patient here with a chief complaint of left shoulder. She does suffer from Down syndrome. She has been diagnosed with a PE recently and was on anticoagulation. She is now off anticoagulation at this time. She previously did see Dr. Raven Resendez 2 months ago and received a left shoulder injection without improvement in symptoms. She denies any injury or trauma. Pain global in nature left shoulder    Past Medical History  Past Medical History:   Diagnosis Date    COVID-19 12/28/2020    Down syndrome     Influenza A 03/10/2020     Past Surgical History:   Procedure Laterality Date    FOOT SURGERY  5/26/11    lapidus procedure and soft tissue release right foot. Social History     Tobacco Use    Smoking status: Never Smoker    Smokeless tobacco: Never Used   Substance Use Topics    Alcohol use: No      Current Outpatient Medications on File Prior to Visit   Medication Sig Dispense Refill    meloxicam (MOBIC) 15 MG tablet Take 1 tablet by mouth daily 30 tablet 5    diclofenac sodium (VOLTAREN) 1 % GEL Apply 2 g topically 2 times daily as needed for Pain      MedroxyPROGESTERone Acetate (DEPO-PROVERA IM) Inject  into the muscle See Admin Instructions. Every three months       Calcium Citrate-Vitamin D 200-200 MG-UNIT TABS Take  by mouth daily.          No current facility-administered medications on file prior to visit. ASCVD 10-YEAR RISK SCORE  The 10-year ASCVD risk score (Man Romo, et al., 2013) is: 1.1%    Values used to calculate the score:      Age: 42 years      Sex: Female      Is Non- : No      Diabetic: No      Tobacco smoker: No      Systolic Blood Pressure: 812 mmHg      Is BP treated: No      HDL Cholesterol: 47 mg/dL      Total Cholesterol: 258 mg/dL     Review of Systems  10-point ROS is negative other than HPI. Physical Exam  Based off 1997 Exam Criteria  Ht 4' 11\" (1.499 m)   Wt 148 lb (67.1 kg)   BMI 29.89 kg/m²      Constitutional:       General: He is not in acute distress. Appearance: Normal appearance. Cardiovascular:      Rate and Rhythm: Normal rate and regular rhythm. Pulses: Normal pulses. Pulmonary:      Effort: Pulmonary effort is normal. No respiratory distress. Neurological:      Mental Status: He is alert and oriented to person, place, and time. Mental status is at baseline. Musculoskeletal:  Gait:  altered    Cervical Spine / Shoulder:      RIGHT  LEFT    Cervical Spine Exam  [x] All Neg    [x] All Neg     Spurling's  []  []Not tested   []  []Not tested    Hobbs's  []  []Not tested   []  []Not tested    Pain with rotation  []  []Not tested   []  []Not tested    Pain with lateral bending  []  []Not tested   []  []Not tested    Paraspinal muscle tenderness  [] Paraspinal  []Midline   [] Paraspinal  []Not tested    Sensation RIGHT  LEFT    Axillary  [x] Normal []Decreased    [x] Normal []Decreased   Musculocutaneous  [x] Normal  []Decreased   [x] Normal []Decreased   Median  [x] Normal []Decreased   [x] Normal []Decreased   Radial  [x] Normal  []Decreased   [x] Normal []Decreased   Ulnar  [x] Normal  []Decreased   [x] Normal []Decreased   Scapula       Position  [x]Nml  []low  [] lateral  [x]Nml  []low  [] lateral   Dyskinesia  []+ []Abn. Shrug   []+ []Abn. Shrug                     Winging     [x]None   []Med []Lat   []Worse w/FE  []Med  []Lat  []Worse w/FE   Scapulothoracic Compress.    []Impr Pain  []Impr Motion  []Impr Pain []Impr Motion    Range of Motion Active Passive Active Passive   Forward Elevation 170  140    Abduction 100  60    External Rotation @ side 60  40    External Rotation @ 90 abd 90  60    Internal Rotation @ 90 abd 40  20    Internal Rotation Normal  L3    End range of motion  [] Pain  [] Pain  [x] Pain  [] Pain   Strength RIGHT /5 LEFT /5   Abduction 5  5    External Rotation 5  5    Internal Rotation 5  5    Provocative Signs/Tests  [x] All Neg   [x] +      [] -  [] All Neg   [x] +      [] -   Rotator Cuff Signs  [x] All Neg  [] Not tested   [] All Neg  [] Not tested    Nemarlee  []  []Not tested   [x]  []Not tested    Noel Andrade  []  []Not tested   [x]  []Not tested    Painful arc  []  []Not tested   [x]  []Not tested    Greater tuberosity tenderness  []  []Not tested   []  []Not tested    Drop arm  []  []Not tested  []  []Not tested   Superior Escape  []  []Not tested   []  []Not tested    ER Lag  []  []Not tested   []  []Not tested    Belly press  []  []Not tested   []  []Not tested    Lift-off  []  []Not tested   []  []Not tested    Bear hug  []  []Not tested   []  []Not tested    Biceps/Labral Signs  [x] All Neg  [] Not tested   [x] All Neg  [] Not tested    Glaser's  []  []Not tested   []  []Not tested    Speed's  []  []Not tested   []  []Not tested    Dynamic Load Shift/Shear  []  []Not tested   []  []Not tested    Clicking/Popping  []  []Not tested  []  []Not tested   Bicipital groove tenderness  []  []Not tested   []  []Not tested    Dalton  []  []Not tested   []  []Not tested    [facility] Joint Signs  [] All Neg  [] Not tested   [] All Neg  [] Not tested    [facility] joint tenderness  []  []Not tested   []  []Not tested    Cross-arm adduction pain  []  []Not tested   []  []Not tested    Instability Signs  [] All Neg  [] Not tested  [] All Neg  [] Not tested   General laxity (thumb/elbow)  []  []Not tested []  []Not tested    Hyperabduction  []  []Not tested   []  []Not tested    Sulcus []Side   []ER    []Side   []ER       Anterior apprehension  []  []Not tested   []  []Not tested    Relocation  []   []Not tested  []  []Not tested     Imaging  Left Shoulder: [facility] [place]  Radiographs: X-rays ordered and reviewed of the left shoulder. 3 views. AP, scapular Y, and axillary views. They demonstrate collapse of the femoral head most likely from AVN but in an odd location. Assessment and Plan  Mor Flores was seen today for shoulder pain. Diagnoses and all orders for this visit:    Left shoulder pain, unspecified chronicity  -     XR SHOULDER LEFT (MIN 2 VIEWS); Future  -     MRI SHOULDER LEFT WO CONTRAST; Future    Avascular necrosis [facility])        Patient has been ordered an MRI to fully evaluate the femoral head collapse. I am favoring AVN but with its location could be due to a multitude of other causes. We will see her back after the MRI to review results. I discussed with Richardson Muniz that her history, symptoms, signs and imaging are most consistent with AVN shoulder. We reviewed the natural history of these conditions and treatment options ranging from conservative measures (rest, icing, activity modification, physical therapy, pain meds) to surgical options. In terms of treatment, I recommended continuing with rest, icing, avoidance of painful activities, NSAIDs or pain meds as tolerated, and physical therapy. We discussed surgical options as well, should conservative measures fail. Electronically signed by Alonso Cui MD on 11/4/2021 at 12:06 PM  This dictation was generated by voice recognition computer software. Although all attempts are made to edit the dictation for accuracy, there may be errors in the transcription that are not intended.

## 2021-11-08 ENCOUNTER — OFFICE VISIT (OUTPATIENT)
Dept: ORTHOPEDIC SURGERY | Age: 46
End: 2021-11-08
Payer: MEDICARE

## 2021-11-08 VITALS — BODY MASS INDEX: 29.84 KG/M2 | HEIGHT: 59 IN | WEIGHT: 148 LBS

## 2021-11-08 DIAGNOSIS — M87.00 AVASCULAR NECROSIS (HCC): ICD-10-CM

## 2021-11-08 DIAGNOSIS — M25.512 LEFT SHOULDER PAIN, UNSPECIFIED CHRONICITY: Primary | ICD-10-CM

## 2021-11-08 PROCEDURE — G8417 CALC BMI ABV UP PARAM F/U: HCPCS | Performed by: PHYSICIAN ASSISTANT

## 2021-11-08 PROCEDURE — 1036F TOBACCO NON-USER: CPT | Performed by: PHYSICIAN ASSISTANT

## 2021-11-08 PROCEDURE — G8484 FLU IMMUNIZE NO ADMIN: HCPCS | Performed by: PHYSICIAN ASSISTANT

## 2021-11-08 PROCEDURE — G8427 DOCREV CUR MEDS BY ELIG CLIN: HCPCS | Performed by: PHYSICIAN ASSISTANT

## 2021-11-08 PROCEDURE — 99213 OFFICE O/P EST LOW 20 MIN: CPT | Performed by: PHYSICIAN ASSISTANT

## 2021-11-08 NOTE — PROGRESS NOTES
Dr Kirk Quinones      Date /Time 11/8/2021             12:06 PM EDT  Name Vane Patterson             1975   Location  [facility]  MRN <[id number]>                Chief Complaint   Patient presents with    Results     TR MRI LEFT SHOULDER         History of Present Illness    Vane Patterson is a 42 y.o. female who presents with  left Shoulder pain. Patient presents the office today for follow-up visit. Patient here for MRI results left shoulder. Continues to complain of pain over the superior lateral aspects of the shoulder. She does have a history of Down syndrome and has had a PE in the past.    Previous history: Patient presents the office today for a new problem. Patient here with a chief complaint of left shoulder. She does suffer from Down syndrome. She has been diagnosed with a PE recently and was on anticoagulation. She is now off anticoagulation at this time. She previously did see Dr. Rock Lopez 2 months ago and received a left shoulder injection without improvement in symptoms. She denies any injury or trauma. Pain global in nature left shoulder    Past Medical History  Past Medical History:   Diagnosis Date    COVID-19 12/28/2020    Down syndrome     Influenza A 03/10/2020     Past Surgical History:   Procedure Laterality Date    FOOT SURGERY  5/26/11    lapidus procedure and soft tissue release right foot. Social History     Tobacco Use    Smoking status: Never Smoker    Smokeless tobacco: Never Used   Substance Use Topics    Alcohol use: No      Current Outpatient Medications on File Prior to Visit   Medication Sig Dispense Refill    meloxicam (MOBIC) 15 MG tablet Take 1 tablet by mouth daily 30 tablet 5    diclofenac sodium (VOLTAREN) 1 % GEL Apply 2 g topically 2 times daily as needed for Pain      MedroxyPROGESTERone Acetate (DEPO-PROVERA IM) Inject  into the muscle See Admin Instructions.  Every three months       Calcium Citrate-Vitamin D 200-200 MG-UNIT TABS Take  by mouth daily. No current facility-administered medications on file prior to visit. ASCVD 10-YEAR RISK SCORE  The 10-year ASCVD risk score (Bernardo Blake., et al., 2013) is: 1.1%    Values used to calculate the score:      Age: 42 years      Sex: Female      Is Non- : No      Diabetic: No      Tobacco smoker: No      Systolic Blood Pressure: 635 mmHg      Is BP treated: No      HDL Cholesterol: 47 mg/dL      Total Cholesterol: 258 mg/dL     Review of Systems  10-point ROS is negative other than HPI. Physical Exam  Based off 1997 Exam Criteria  Ht 4' 11\" (1.499 m)   Wt 148 lb (67.1 kg)   BMI 29.89 kg/m²      Constitutional:       General: He is not in acute distress. Appearance: Normal appearance. Cardiovascular:      Rate and Rhythm: Normal rate and regular rhythm. Pulses: Normal pulses. Pulmonary:      Effort: Pulmonary effort is normal. No respiratory distress. Neurological:      Mental Status: He is alert and oriented to person, place, and time. Mental status is at baseline. Musculoskeletal:  Gait:  altered    Cervical Spine / Shoulder:      RIGHT  LEFT    Cervical Spine Exam  [x] All Neg    [x] All Neg     Spurling's  []  []Not tested   []  []Not tested    Hobbs's  []  []Not tested   []  []Not tested    Pain with rotation  []  []Not tested   []  []Not tested    Pain with lateral bending  []  []Not tested   []  []Not tested    Paraspinal muscle tenderness  [] Paraspinal  []Midline   [] Paraspinal  []Not tested    Sensation RIGHT  LEFT    Axillary  [x] Normal []Decreased    [x] Normal []Decreased   Musculocutaneous  [x] Normal  []Decreased   [x] Normal []Decreased   Median  [x] Normal []Decreased   [x] Normal []Decreased   Radial  [x] Normal  []Decreased   [x] Normal []Decreased   Ulnar  [x] Normal  []Decreased   [x] Normal []Decreased   Scapula       Position  [x]Nml  []low  [] lateral  [x]Nml  []low  [] lateral   Dyskinesia  []+ []Abn. Shrug   []+ []Abn.Shrug                     Winging     [x]None   []Med  []Lat   []Worse w/FE  []Med  []Lat  []Worse w/FE   Scapulothoracic Compress.    []Impr Pain  []Impr Motion  []Impr Pain []Impr Motion    Range of Motion Active Passive Active Passive   Forward Elevation 170  140    Abduction 100  60    External Rotation @ side 60  40    External Rotation @ 90 abd 90  60    Internal Rotation @ 90 abd 40  20    Internal Rotation Normal  L3    End range of motion  [] Pain  [] Pain  [x] Pain  [] Pain   Strength RIGHT /5 LEFT /5   Abduction 5  5    External Rotation 5  5    Internal Rotation 5  5    Provocative Signs/Tests  [x] All Neg   [x] +      [] -  [] All Neg   [x] +      [] -   Rotator Cuff Signs  [x] All Neg  [] Not tested   [] All Neg  [] Not tested    Neer  []  []Not tested   [x]  []Not tested    Jessica Raza  []  []Not tested   [x]  []Not tested    Painful arc  []  []Not tested   [x]  []Not tested    Greater tuberosity tenderness  []  []Not tested   []  []Not tested    Drop arm  []  []Not tested  []  []Not tested   Superior Escape  []  []Not tested   []  []Not tested    ER Lag  []  []Not tested   []  []Not tested    Belly press  []  []Not tested   []  []Not tested    Lift-off  []  []Not tested   []  []Not tested    Bear hug  []  []Not tested   []  []Not tested    Biceps/Labral Signs  [x] All Neg  [] Not tested   [x] All Neg  [] Not tested    Glaser's  []  []Not tested   []  []Not tested    Speed's  []  []Not tested   []  []Not tested    Dynamic Load Shift/Shear  []  []Not tested   []  []Not tested    Clicking/Popping  []  []Not tested  []  []Not tested   Bicipital groove tenderness  []  []Not tested   []  []Not tested    Dalton  []  []Not tested   []  []Not tested    [facility] Joint Signs  [] All Neg  [] Not tested   [] All Neg  [] Not tested    [facility] joint tenderness  []  []Not tested   []  []Not tested    Cross-arm adduction pain  []  []Not tested   []  []Not tested    Instability Signs  [] All Neg  [] Not tested  [] All Neg  [] Not tested   General laxity (thumb/elbow)  []  []Not tested   []  []Not tested    Hyperabduction  []  []Not tested   []  []Not tested    Sulcus []Side   []ER    []Side   []ER       Anterior apprehension  []  []Not tested   []  []Not tested    Relocation  []   []Not tested  []  []Not tested     Imaging  Left Shoulder: [address] [facility] [facility]  Previous Radiographs: X-rays ordered and reviewed of the left shoulder. 3 views. AP, scapular Y, and axillary views. They demonstrate collapse of the femoral head most likely from AVN but in an odd location. MRI results:    EXAMINATION:   MRI OF THE LEFT SHOULDER WITHOUT CONTRAST   11/4/2021 6:02 pm       TECHNIQUE:   Multiplanar multisequence MRI of the left shoulder was performed without the   administration of intravenous contrast.       COMPARISON:   Left shoulder plain radiographs from 11/04/2021.       HISTORY:   ORDERING SYSTEM PROVIDED HISTORY: Left shoulder pain, unspecified chronicity   TECHNOLOGIST PROVIDED HISTORY:   Reason for exam:->r/o AVN   Is the patient pregnant?->No   Reason for Exam: Chronic left shoulder pain for over a year. Her pain is most   noticeable with any overuse of her left arm. She loves to go bowling and now   her arm is too sore for this activity. No known injury or trauma. Acuity: Chronic   Type of Exam: Subsequent/Follow-up       22-year-old female with left shoulder pain; rule out AVN.        FINDINGS:   ROTATOR CUFF: Trace fluid in the subacromial subdeltoid bursa.       Shallow partial-thickness articular-surface tearing of posterior   supraspinatus along the footplate measuring 4 mm in greatest AP dimension on   image 6, series 701 and image 13, series 1001.  Mild underlying supraspinatus   tendinopathy.       Mild to moderate infraspinatus tendinosis.       Less than 50% partial-thickness articular-surface tearing of subscapularis   with mild underlying subscapularis tendinosis.       Teres minor muscle/tendon appears grossly intact suffering from left humeral head AVN. She does appear to have collapse on x-rays and MRI. Patient really needs to consider shoulder arthroplasty as a future treatment. Dad and mom would like to discuss it between themselves and I have helped them arrange an appointment with Dr. Bubba Brown in 2 weeks or sooner if problems arise. I discussed with Rebbeca Lesch that her history, symptoms, signs and imaging are most consistent with AVN    We reviewed the natural history of these conditions and treatment options ranging from conservative measures (rest, icing, activity modification, physical therapy, pain meds) to surgical options. In terms of treatment, I recommended continuing with rest, icing, avoidance of painful activities, NSAIDs or pain meds as tolerated, and physical therapy. We discussed surgical options as well, should conservative measures fail. Electronically signed by Mireille Abraham PA-C on 11/8/2021 at 3:48 PM  This dictation was generated by voice recognition computer software. Although all attempts are made to edit the dictation for accuracy, there may be errors in the transcription that are not intended.

## 2021-12-02 ENCOUNTER — OFFICE VISIT (OUTPATIENT)
Dept: ORTHOPEDIC SURGERY | Age: 46
End: 2021-12-02
Payer: MEDICARE

## 2021-12-02 VITALS — BODY MASS INDEX: 29.84 KG/M2 | WEIGHT: 148 LBS | HEIGHT: 59 IN

## 2021-12-02 DIAGNOSIS — M87.00 AVASCULAR NECROSIS (HCC): ICD-10-CM

## 2021-12-02 DIAGNOSIS — M25.512 LEFT SHOULDER PAIN, UNSPECIFIED CHRONICITY: Primary | ICD-10-CM

## 2021-12-02 PROCEDURE — 99215 OFFICE O/P EST HI 40 MIN: CPT | Performed by: ORTHOPAEDIC SURGERY

## 2021-12-02 PROCEDURE — G8484 FLU IMMUNIZE NO ADMIN: HCPCS | Performed by: ORTHOPAEDIC SURGERY

## 2021-12-02 PROCEDURE — L3670 SO ACRO/CLAV CAN WEB PRE OTS: HCPCS | Performed by: ORTHOPAEDIC SURGERY

## 2021-12-02 PROCEDURE — G8417 CALC BMI ABV UP PARAM F/U: HCPCS | Performed by: ORTHOPAEDIC SURGERY

## 2021-12-02 PROCEDURE — G8427 DOCREV CUR MEDS BY ELIG CLIN: HCPCS | Performed by: ORTHOPAEDIC SURGERY

## 2021-12-02 PROCEDURE — 1036F TOBACCO NON-USER: CPT | Performed by: ORTHOPAEDIC SURGERY

## 2021-12-02 NOTE — LETTER
[facility]  Surgery Scheduling Form:    21     DEMOGRAPHICS    Patient Name:  Erin Dick  Patient :  1975   Patient SS#:  [id number]    Patient Phone:  [phone] (home)  Alt. Patient Phone:    Patient Address:  [address] [place] [address]    PCP:  Ember Rankin MD  Insurance:  Payor: MEDICARE / Plan: MEDICARE PART A AND B / Product Type: *No Product type* /   Insurance ID Number:  Payor/Plan Subscr  Sex Relation Sub. Ins. ID Effective Group Num   1. MEDICARE - MEMaurine Conception 1975 Female Self [id number]                                    [address] [address]   2.  [place] Nocona Blase 1975 Female Self [id number]                                    [address]       DIAGNOSIS & PROCEDURE    Diagnosis: LEFT  M19.012 Primary osteoarthritis, left shoulder   M75.112 Incomplete rotator cuff tear or rupture of left shoulder, not specified as traumatic   M87.00 avascular necrosis    Operation: LEFT  76482 Total Shoulder Replacement versus hemiarthroplasty  88313 biceps tenodesis  00578 Repair of ruptured musculotendinous cuff; chronic    Provider:  Radhika Guzman MD    Location: [facility] INFORMATION    Requested Date:  2022    Requested Time:  TBD      Patient Arrival Time:  2 hours prior to surgical procedure    OR Time Required:  90 Minutes  Admission:  []Outpatient   []23 hour  [x]Same Day Admit:   1-2   days  []Inpatient    Anesthesia:  [x]General  []Spinal  []MAC/Sedation  Regional Anesthesia:  []None  []Lumbar Plexus Block  []Fascia Iliaca  []Femoral  []Adductor canal  [x]Interscalene Block  []Insert Catheter       EQUIPMENT    Position:  []Supine  []Lateral  [x]Beach-chair  []Prone    OR Bed:  []Regular  []Tabor  []Mart  []Hip vaughn  [x]Beach-chair  [x]Spyder  Radiology:  []Large C-arm  []Small C-arm  []Portable X-ray    Implants:  Fx Shoulder:  [x]Primary Set []Reverse Set  []Reverse for Fracture Set   Medacta Shoulder:  []Primary Set []Reverse Set  []Reverse for Fracture Set     SUTURE: [x]#5 Ethibond  [x]#2 Ethibond  []#2 Quill  []#1 PDS                   [x]2-0 Vicryl  [x]3-0 Monocryl  []2-0 Nylon  []3-0 Nylon  []3-0 PDS                    []Dermabond  []Steri-strips (in half)  DRESSING:  [x]Prineo dermabond  []4x4 gauze  [x]ABDs  [x]Tegaderm  BRACE: [x]Shoulder Immob. (w/abd. pillow)  []Sling  [x]Ice Unit  []Ace-Wrap      [][employer]:  Nataliia Calles [phone], Terri [email or web address]. [email or web address]  []Medacta: Marleny Hooks [phone], [email or web address]  [x]Fx Shoulder: [employer] [phone], Shauna Carrasco. [email or web address]  []Praneeth: [employer] [phone], Liv Woodard. [email or web address]  []Iqbal & Nephew: Leticia Anderson [phone]    Comments:        Shanika Diamond MD  [address] [facility]  12/2/2021       10:26 AM EST

## 2021-12-02 NOTE — PROGRESS NOTES
Dr Kaur Members      Date /Time 12/2/2021             12:06 PM EDT  Name Sharman Dancer             1975   Location  [facility]  MRN <[id number]>                Chief Complaint   Patient presents with    Shoulder Pain     CK LEFT SHOULDER         History of Present Illness    Sharman Dancer is a 42 y.o. female who presents with  left Shoulder pain. Patient presents the office today for follow-up visit. Patient here for MRI results left shoulder. Continues to complain of pain over the superior lateral aspects of the shoulder. She does have a history of Down syndrome and has had a PE in the past.  Her father is a . She was seen last by Issa Ritchie PA-C. Now presenting with similar complaints for further discussion of her MRI. Previous history: Patient presents the office today for a new problem. Patient here with a chief complaint of left shoulder. She does suffer from Down syndrome. She has been diagnosed with a PE recently and was on anticoagulation. She is now off anticoagulation at this time. She previously did see Dr. Hosea Kurtz 2 months ago and received a left shoulder injection without improvement in symptoms. She denies any injury or trauma. Pain global in nature left shoulder    Past Medical History  Past Medical History:   Diagnosis Date    COVID-19 12/28/2020    Down syndrome     Influenza A 03/10/2020     Past Surgical History:   Procedure Laterality Date    FOOT SURGERY  5/26/11    lapidus procedure and soft tissue release right foot.      Social History     Tobacco Use    Smoking status: Never Smoker    Smokeless tobacco: Never Used   Substance Use Topics    Alcohol use: No      Current Outpatient Medications on File Prior to Visit   Medication Sig Dispense Refill    meloxicam (MOBIC) 15 MG tablet Take 1 tablet by mouth daily 30 tablet 5    diclofenac sodium (VOLTAREN) 1 % GEL Apply 2 g topically 2 times daily as needed for Pain      MedroxyPROGESTERone Acetate (DEPO-PROVERA IM) Inject  into the muscle See Admin Instructions. Every three months       Calcium Citrate-Vitamin D 200-200 MG-UNIT TABS Take  by mouth daily. No current facility-administered medications on file prior to visit. ASCVD 10-YEAR RISK SCORE  The 10-year ASCVD risk score (Ronnell Dwyer, et al., 2013) is: 1.1%    Values used to calculate the score:      Age: 42 years      Sex: Female      Is Non- : No      Diabetic: No      Tobacco smoker: No      Systolic Blood Pressure: 141 mmHg      Is BP treated: No      HDL Cholesterol: 47 mg/dL      Total Cholesterol: 258 mg/dL     Review of Systems  10-point ROS is negative other than HPI. Physical Exam  Based off 1997 Exam Criteria  Ht 4' 11\" (1.499 m)   Wt 148 lb (67.1 kg)   BMI 29.89 kg/m²      Constitutional:       General: He is not in acute distress. Appearance: Normal appearance. Cardiovascular:      Rate and Rhythm: Normal rate and regular rhythm. Pulses: Normal pulses. Pulmonary:      Effort: Pulmonary effort is normal. No respiratory distress. Neurological:      Mental Status: He is alert and oriented to person, place, and time. Mental status is at baseline.      Musculoskeletal:  Gait:  altered    Cervical Spine / Shoulder:      RIGHT  LEFT    Cervical Spine Exam  [x] All Neg    [x] All Neg     Spurling's  []  []Not tested   []  []Not tested    Hobbs's  []  []Not tested   []  []Not tested    Pain with rotation  []  []Not tested   []  []Not tested    Pain with lateral bending  []  []Not tested   []  []Not tested    Paraspinal muscle tenderness  [] Paraspinal  []Midline   [] Paraspinal  []Not tested    Sensation RIGHT  LEFT    Axillary  [x] Normal []Decreased    [x] Normal []Decreased   Musculocutaneous  [x] Normal  []Decreased   [x] Normal []Decreased   Median  [x] Normal []Decreased   [x] Normal []Decreased   Radial  [x] Normal  []Decreased   [x] Normal []Decreased   Ulnar  [x] Normal []Decreased   [x] Normal []Decreased   Scapula       Position  [x]Nml  []low  [] lateral  [x]Nml  []low  [] lateral   Dyskinesia  []+ []Abn. Shrug   []+ []Abn. Shrug                     Winging     [x]None   []Med  []Lat   []Worse w/FE  []Med  []Lat  []Worse w/FE   Scapulothoracic Compress.    []Impr Pain  []Impr Motion  []Impr Pain []Impr Motion    Range of Motion Active Passive Active Passive   Forward Elevation 170  140    Abduction 100  60    External Rotation @ side 60  40    External Rotation @ 90 abd 90  60    Internal Rotation @ 90 abd 40  20    Internal Rotation Normal  L3    End range of motion  [] Pain  [] Pain  [x] Pain  [] Pain   Strength RIGHT /5 LEFT /5   Abduction 5  5    External Rotation 5  5    Internal Rotation 5  5    Provocative Signs/Tests  [x] All Neg   [x] +      [] -  [] All Neg   [x] +      [] -   Rotator Cuff Signs  [x] All Neg  [] Not tested   [] All Neg  [] Not tested    Neer  []  []Not tested   [x]  []Not tested    Ema Drown  []  []Not tested   [x]  []Not tested    Painful arc  []  []Not tested   [x]  []Not tested    Greater tuberosity tenderness  []  []Not tested   []  []Not tested    Drop arm  []  []Not tested  []  []Not tested   Superior Escape  []  []Not tested   []  []Not tested    ER Lag  []  []Not tested   []  []Not tested    Belly press  []  []Not tested   []  []Not tested    Lift-off  []  []Not tested   []  []Not tested    Bear hug  []  []Not tested   []  []Not tested    Biceps/Labral Signs  [x] All Neg  [] Not tested   [x] All Neg  [] Not tested    Glaser's  []  []Not tested   []  []Not tested    Speed's  []  []Not tested   []  []Not tested    Dynamic Load Shift/Shear  []  []Not tested   []  []Not tested    Clicking/Popping  []  []Not tested  []  []Not tested   Bicipital groove tenderness  []  []Not tested   []  []Not tested    [place]  []  []Not tested   []  []Not tested    [facility] Joint Signs  [] All Neg  [] Not tested   [] All Neg  [] Not tested    [facility] joint tenderness  []  []Not with secondary changes in the glenoid. Based on MRI, she does have chondral damage as well as underlying fluid change present within the glenoid bone. This likely is a result of both articular surfaces being affected from AVN. Assessment and Plan  Sourav was seen today for shoulder pain. Diagnoses and all orders for this visit:    Left shoulder pain, unspecified chronicity  -     Breg Sling Shot 2 Shoulder Sling    Avascular necrosis (HCC)  -     Breg Sling Shot 2 Shoulder Sling        I discussed with Karely Matthewsett that her history, symptoms, signs and imaging are most consistent with glenohumeral arthritis, secondary to avascular necrosis    We reviewed the natural history of these conditions and treatment options ranging from conservative measures (rest, icing, activity modification, physical therapy, pain meds, cortisone injection)  to surgical options. We had a long discussion with the patient about their shoulder. We discussed surgical and non surgical options for shoulder arthritis. The most important thing is to work to maintain their range of motion. Next they can try medications including tylenol and NSAIDs. They can try glucosamine or chondroitin. They should also ice frequently and avoid activities that make their shoulder hurt. Cortisone injections and Synvisc injections are also options when medicine has failed. We finally discussed surgical options including arthroscopic debridement versus shoulder replacement. Often the arthritis is too far gone for an arthroscopic debridement and pain relief will be short term. Their ultimate solution will be a shoulder replacement when they are ready for it. They should put it off until they can no longer stand the pain and when nothing else has worked. Conservative measures have failed. She is not interested in cortisone injections.   I think she is an appropriate candidate for surgery due to her ongoing symptoms and dysfunction despite conservative measures. We discussed hemiarthroplasty versus total shoulder arthroplasty. I think most of her pain is coming from her humeral head. However there are kissing lesions present within the glenoid vault concerning for chondromalacia on this side as well. I discussed alan versus total with the family today as being a potential intraoperative decision. Likely, I suspect total will be the chosen treatment. I am trying to reconcile 74-year-old otherwise active person but with a history of Down syndrome. If she has low-grade chondromalacia on the glenoid side, I would prefer to leave this on resurfaced. However, I believe total shoulder replacement most predictably relieves her pain in the setting of grade 4 AVN. She also has incomplete pain tearing of the subscapularis and lateral partial insertional tearing of the supraspinatus. I would plan to perform a rotator cuff repair in conjunction with a total shoulder replacement. The procedure would be  [phone] Primary total Shoulder Replacement versus alan-  [address] [place] of long tendon of biceps  31551 repair of ruptured chronic rotator cuff    Perioperative considerations include: Preop PCP eval and PE History. We reviewed the risks, benefits, alternatives of this approach. We discussed risks including, but not limited to, bleeding, pain, infection, scarring, damage to the neurovascular structures, blood clots, pulmonary embolus, stiffness, implant instability or loosening, implant failure, incomplete relief of pain, and incomplete return of function. She is at independently high risk for perioperative complications considering her recent history of Covid pneumonia, recent history of pulmonary embolus requiring Eliquis. She would require Eliquis 2.5 mg twice daily postop for approximately 30 days. We also reviewed the surgical details, expected recovery, and rehabilitation (6-9 months).     She expressed understanding and will undergo preoperative medical evaluation and optimization. We discussed surgical options as well, should conservative measures fail. Electronically signed by Myrna Oleary MD on 12/2/2021 at 10:20 AM  This dictation was generated by voice recognition computer software. Although all attempts are made to edit the dictation for accuracy, there may be errors in the transcription that are not intended.

## 2022-01-07 ENCOUNTER — HOSPITAL ENCOUNTER (OUTPATIENT)
Age: 47
Discharge: HOME | End: 2022-01-11
Payer: MEDICARE

## 2022-01-07 LAB
ABO/RH: NORMAL
ALBUMIN SERPL-MCNC: 4 G/DL (ref 3.4–5)
ANION GAP SERPL CALCULATED.3IONS-SCNC: 13 MMOL/L (ref 3–16)
ANTIBODY SCREEN: NORMAL
APTT: 32.2 SEC (ref 26.2–38.6)
BACTERIA: ABNORMAL /HPF
BASOPHILS ABSOLUTE: 0 K/UL (ref 0–0.2)
BASOPHILS RELATIVE PERCENT: 0.8 %
BILIRUBIN URINE: NEGATIVE
BLOOD, URINE: ABNORMAL
BUN BLDV-MCNC: 17 MG/DL (ref 7–20)
CALCIUM SERPL-MCNC: 9.7 MG/DL (ref 8.3–10.6)
CHLORIDE BLD-SCNC: 104 MMOL/L (ref 99–110)
CLARITY: ABNORMAL
CO2: 23 MMOL/L (ref 21–32)
COLOR: YELLOW
CREAT SERPL-MCNC: 0.9 MG/DL (ref 0.6–1.1)
EKG ATRIAL RATE: 74 BPM
EKG DIAGNOSIS: NORMAL
EKG P AXIS: -1 DEGREES
EKG P-R INTERVAL: 130 MS
EKG Q-T INTERVAL: 366 MS
EKG QRS DURATION: 68 MS
EKG QTC CALCULATION (BAZETT): 406 MS
EKG R AXIS: 47 DEGREES
EKG T AXIS: 46 DEGREES
EKG VENTRICULAR RATE: 74 BPM
EOSINOPHILS ABSOLUTE: 0 K/UL (ref 0–0.6)
EOSINOPHILS RELATIVE PERCENT: 0.9 %
EPITHELIAL CELLS, UA: ABNORMAL /HPF (ref 0–5)
GFR AFRICAN AMERICAN: >60
GFR NON-AFRICAN AMERICAN: >60
GLUCOSE BLD-MCNC: 79 MG/DL (ref 70–99)
GLUCOSE URINE: NEGATIVE MG/DL
HCT VFR BLD CALC: 44 % (ref 36–48)
HEMOGLOBIN: 14.8 G/DL (ref 12–16)
INR BLD: 1.03 (ref 0.88–1.12)
KETONES, URINE: NEGATIVE MG/DL
LEUKOCYTE ESTERASE, URINE: ABNORMAL
LYMPHOCYTES ABSOLUTE: 1.6 K/UL (ref 1–5.1)
LYMPHOCYTES RELATIVE PERCENT: 44.8 %
MCH RBC QN AUTO: 32.9 PG (ref 26–34)
MCHC RBC AUTO-ENTMCNC: 33.6 G/DL (ref 31–36)
MCV RBC AUTO: 97.7 FL (ref 80–100)
MICROSCOPIC EXAMINATION: YES
MONOCYTES ABSOLUTE: 0.4 K/UL (ref 0–1.3)
MONOCYTES RELATIVE PERCENT: 11.4 %
MUCUS: ABNORMAL /LPF
NEUTROPHILS ABSOLUTE: 1.5 K/UL (ref 1.7–7.7)
NEUTROPHILS RELATIVE PERCENT: 42.1 %
NITRITE, URINE: POSITIVE
PDW BLD-RTO: 13.4 % (ref 12.4–15.4)
PH UA: 5.5 (ref 5–8)
PLATELET # BLD: 239 K/UL (ref 135–450)
PMV BLD AUTO: 8.7 FL (ref 5–10.5)
POTASSIUM SERPL-SCNC: 4.3 MMOL/L (ref 3.5–5.1)
PROTEIN UA: NEGATIVE MG/DL
PROTHROMBIN TIME: 11.7 SEC (ref 9.9–12.7)
RBC # BLD: 4.5 M/UL (ref 4–5.2)
RBC UA: ABNORMAL /HPF (ref 0–4)
SODIUM BLD-SCNC: 140 MMOL/L (ref 136–145)
SPECIFIC GRAVITY UA: 1.02 (ref 1–1.03)
TRANSFERRIN: 247 MG/DL (ref 200–360)
URINE TYPE: ABNORMAL
UROBILINOGEN, URINE: 0.2 E.U./DL
WBC # BLD: 3.5 K/UL (ref 4–11)
WBC UA: ABNORMAL /HPF (ref 0–5)

## 2022-01-07 PROCEDURE — 84466 ASSAY OF TRANSFERRIN: CPT

## 2022-01-07 PROCEDURE — 36415 COLL VENOUS BLD VENIPUNCTURE: CPT

## 2022-01-07 PROCEDURE — 86850 RBC ANTIBODY SCREEN: CPT

## 2022-01-07 PROCEDURE — 93005 ELECTROCARDIOGRAM TRACING: CPT | Performed by: ORTHOPAEDIC SURGERY

## 2022-01-07 PROCEDURE — 87077 CULTURE AEROBIC IDENTIFY: CPT

## 2022-01-07 PROCEDURE — 85610 PROTHROMBIN TIME: CPT

## 2022-01-07 PROCEDURE — 87086 URINE CULTURE/COLONY COUNT: CPT

## 2022-01-07 PROCEDURE — 87186 SC STD MICRODIL/AGAR DIL: CPT

## 2022-01-07 PROCEDURE — 86900 BLOOD TYPING SEROLOGIC ABO: CPT

## 2022-01-07 PROCEDURE — 81001 URINALYSIS AUTO W/SCOPE: CPT

## 2022-01-07 PROCEDURE — 85025 COMPLETE CBC W/AUTO DIFF WBC: CPT

## 2022-01-07 PROCEDURE — 93010 ELECTROCARDIOGRAM REPORT: CPT | Performed by: INTERNAL MEDICINE

## 2022-01-07 PROCEDURE — 86901 BLOOD TYPING SEROLOGIC RH(D): CPT

## 2022-01-07 PROCEDURE — 80048 BASIC METABOLIC PNL TOTAL CA: CPT

## 2022-01-07 PROCEDURE — 82040 ASSAY OF SERUM ALBUMIN: CPT

## 2022-01-07 PROCEDURE — 85730 THROMBOPLASTIN TIME PARTIAL: CPT

## 2022-01-07 PROCEDURE — 87641 MR-STAPH DNA AMP PROBE: CPT

## 2022-01-07 PROCEDURE — 83036 HEMOGLOBIN GLYCOSYLATED A1C: CPT

## 2022-01-08 LAB
ESTIMATED AVERAGE GLUCOSE: 108.3 MG/DL
HBA1C MFR BLD: 5.4 %
MRSA SCREEN RT-PCR: NORMAL

## 2022-01-09 LAB
ORGANISM: ABNORMAL
URINE CULTURE, ROUTINE: ABNORMAL
URINE CULTURE, ROUTINE: ABNORMAL

## 2022-01-10 ENCOUNTER — OFFICE VISIT (OUTPATIENT)
Dept: FAMILY MEDICINE CLINIC | Age: 47
End: 2022-01-10
Payer: MEDICARE

## 2022-01-10 VITALS
SYSTOLIC BLOOD PRESSURE: 102 MMHG | DIASTOLIC BLOOD PRESSURE: 80 MMHG | WEIGHT: 150 LBS | TEMPERATURE: 97.6 F | HEART RATE: 98 BPM | BODY MASS INDEX: 31.49 KG/M2 | HEIGHT: 58 IN | OXYGEN SATURATION: 96 %

## 2022-01-10 DIAGNOSIS — I26.99 PULMONARY EMBOLISM, UNSPECIFIED CHRONICITY, UNSPECIFIED PULMONARY EMBOLISM TYPE, UNSPECIFIED WHETHER ACUTE COR PULMONALE PRESENT (HCC): ICD-10-CM

## 2022-01-10 DIAGNOSIS — N39.0 URINARY TRACT INFECTION WITH HEMATURIA, SITE UNSPECIFIED: ICD-10-CM

## 2022-01-10 DIAGNOSIS — R73.9 HYPERGLYCEMIA: ICD-10-CM

## 2022-01-10 DIAGNOSIS — M75.42 ROTATOR CUFF IMPINGEMENT SYNDROME OF LEFT SHOULDER: ICD-10-CM

## 2022-01-10 DIAGNOSIS — Q90.9 DOWN'S SYNDROME: ICD-10-CM

## 2022-01-10 DIAGNOSIS — E55.9 VITAMIN D DEFICIENCY: ICD-10-CM

## 2022-01-10 DIAGNOSIS — Z01.818 PREOP EXAMINATION: Primary | ICD-10-CM

## 2022-01-10 DIAGNOSIS — R31.9 URINARY TRACT INFECTION WITH HEMATURIA, SITE UNSPECIFIED: ICD-10-CM

## 2022-01-10 PROCEDURE — 99215 OFFICE O/P EST HI 40 MIN: CPT | Performed by: FAMILY MEDICINE

## 2022-01-10 PROCEDURE — G8484 FLU IMMUNIZE NO ADMIN: HCPCS | Performed by: FAMILY MEDICINE

## 2022-01-10 PROCEDURE — G8427 DOCREV CUR MEDS BY ELIG CLIN: HCPCS | Performed by: FAMILY MEDICINE

## 2022-01-10 PROCEDURE — G8417 CALC BMI ABV UP PARAM F/U: HCPCS | Performed by: FAMILY MEDICINE

## 2022-01-10 RX ORDER — AMPICILLIN 500 MG/1
500 CAPSULE ORAL 3 TIMES DAILY
Qty: 21 CAPSULE | Refills: 0 | Status: SHIPPED | OUTPATIENT
Start: 2022-01-10 | End: 2022-01-17

## 2022-01-10 NOTE — PROGRESS NOTES
Tristian Gonzalez MD, [address] [place] [address],[address]. [place], [address] [employer]  Phone: [phone]  Fax: [phone]  Preoperative Consultation      Brent Govea  YOB: 1975    Date of Service:  1/10/2022     Internal Administration   First Dose COVID-19, Pfizer, PF, 30mcg/0.3mL  01/29/2021   Second Dose COVID-19, Pfizer, PF, 30mcg/0.3mL   02/19/2021       Last COVID Lab No results found for: SARS-COV-2, SARS-COV-2 RNA, SARS-COV-2, SARS-COV-2, SARS-COV-2 BY PCR, SARS-COV-2, SARS-COV-2, SARS-COV-2         Vitals:    01/10/22 1407   BP: 102/80   Site: Left Upper Arm   Position: Sitting   Cuff Size: Large Adult   Pulse: 98   Temp: 97.6 °F (36.4 °C)   TempSrc: Oral   SpO2: 96%   Weight: 150 lb (68 kg)   Height: 4' 10\" (1.473 m)      Wt Readings from Last 2 Encounters:   01/10/22 150 lb (68 kg)   12/02/21 148 lb (67.1 kg)     BP Readings from Last 3 Encounters:   01/10/22 102/80   08/06/21 110/68   07/13/21 108/78        Chief Complaint   Patient presents with   [place]orette Pool Exam     Left Shoulder Replacement Dr. Paolo Milner 1/19/2022 Dr. Jairo Barreto at [facility]     No Known Allergies  Outpatient Medications Marked as Taking for the 1/10/22 encounter (Office Visit) with Caryle Sicks, MD   Medication Sig Dispense Refill    mupirocin (BACTROBAN NASAL) 2 % nasal ointment Take by Nasal route 2 times daily for the 5 days prior to surgery BEGIN 01/14/2022 1 g 0    MedroxyPROGESTERone Acetate (DEPO-PROVERA IM) Inject  into the muscle See Admin Instructions. Every three months       Calcium Citrate-Vitamin D 200-200 MG-UNIT TABS Take  by mouth daily. This patient presents to the office today for a preoperative consultation at the request of surgeon, Dr. Jairo Barreto, who plans on performing Left Shoulder Replacement on January 19 at [place] [address].  The current problem began many years ago, and symptoms have been worsening with time.   Conservative therapy: N/A.    Planned anesthesia: General   Known anesthesia problems: None   Bleeding risk: No recent or remote history of abnormal bleeding  Personal or FH of DVT/PE: Yes - December 2020 had blood clot in her lungs as well as covid    Patient objection to receiving blood products: No    Patient Active Problem List   Diagnosis    Bunion of right foot    Closed displaced fracture of fourth metatarsal bone of right foot    Pneumonia due to COVID-19 virus    Pulmonary infiltrates    Hypoxemia    Hyponatremia    Down's syndrome    Vitamin D deficiency    Pulmonary embolism ([facility] [address]    Hyperglycemia    Rotator cuff impingement syndrome of left shoulder       Past Medical History:   Diagnosis Date    COVID-19 12/28/2020    Down syndrome     Influenza A 03/10/2020     Past Surgical History:   Procedure Laterality Date    FOOT SURGERY  5/26/11    lapidus procedure and soft tissue release right foot.      Family History   Problem Relation Age of Onset    Arthritis Mother     Cancer Mother     Diabetes Mother     High Blood Pressure Mother     High Cholesterol Mother     Arthritis Father     Hearing Loss Father     Heart Disease Father     High Blood Pressure Father     High Cholesterol Father     Stroke Father     Arthritis Sister     Cancer Sister     Asthma Maternal Grandmother     Early Death Maternal Grandfather      Social History     Socioeconomic History    Marital status: Single     Spouse name: Not on file    Number of children: Not on file    Years of education: Not on file    Highest education level: Not on file   Occupational History    Not on file   Tobacco Use    Smoking status: Never Smoker    Smokeless tobacco: Never Used   Substance and Sexual Activity    Alcohol use: No    Drug use: No    Sexual activity: Not on file   Other Topics Concern    Not on file   Social History Narrative    Not on file     Social Determinants of Health     Financial Resource Strain: Low Risk     Difficulty of Paying Living Expenses: Not hard at all   Food Insecurity: No Food Insecurity    Worried About Running Out of Food in the Last Year: Never true    Ran Out of Food in the Last Year: Never true   Transportation Needs:     Lack of Transportation (Medical): Not on file    Lack of Transportation (Non-Medical): Not on file   Physical Activity:     Days of Exercise per Week: Not on file    Minutes of Exercise per Session: Not on file   Stress:     Feeling of Stress : Not on file   Social Connections:     Frequency of Communication with Friends and Family: Not on file    Frequency of Social Gatherings with Friends and Family: Not on file    Attends Orthodoxy Services: Not on file    Active Member of [address] [place] [address] [employer] or Organizations: Not on file    Attends Club or Organization Meetings: Not on file    Marital Status: Not on file   Intimate Partner Violence:     Fear of Current or Ex-Partner: Not on file    Emotionally Abused: Not on file    Physically Abused: Not on file    Sexually Abused: Not on file   Housing Stability:     Unable to Pay for Housing in the Last Year: Not on file    Number of [place] in the Last Year: Not on file    Unstable Housing in the Last Year: Not on file       Review of Systems  A comprehensive review of systems was negative except for what was noted in the HPI. Upon reviewing the recent lab results it showed positive for UTI. Ampicillin sent to pharmacy during todays visit. Physical Exam   Constitutional: She is oriented to person, place, and time. She appears well-developed and well-nourished. No distress. HENT:   Head: Normocephalic and atraumatic. Mouth/Throat: Uvula is midline, oropharynx is clear and moist and mucous membranes are normal.   Eyes: Conjunctivae and EOM are normal. Pupils are equal, round, and reactive to light. Neck: Trachea normal and normal range of motion. Neck supple. No JVD present. Carotid bruit is not present.  No mass and no thyromegaly present. Cardiovascular: Normal rate, regular rhythm, normal heart sounds and intact distal pulses. Exam reveals no gallop and no friction rub. No murmur heard. Pulmonary/Chest: Effort normal and breath sounds normal. No respiratory distress. She has no wheezes. She has no rales. Abdominal: Soft. Normal aorta and bowel sounds are normal. She exhibits no distension and no mass. There is no hepatosplenomegaly. No tenderness. Musculoskeletal: She exhibits no edema and no tenderness. Decreased range of motion left shoulder  Neurological: She is alert and oriented to person, place, and time. She has normal strength. No cranial nerve deficit or sensory deficit. Coordination and gait normal.   Skin: Skin is warm and dry. No rash noted. No erythema. Psychiatric: She has a normal mood and affect. Her behavior is normal.   Lymph: neg anterior/cervical, occipital, supraclavicular nodes  Normal range of motion elbows, knees, ankles    EKG Interpretation:  normal EKG, normal sinus rhythm, unchanged from previous tracings. Lab Review not applicable          Assessment:       42 y.o. patient with planned surgery as above. Known risk factors for perioperative complications: Bleeding concerns- History of PE with covid, Clotting disorder- PE with diagnosis of covid     RCRI (Revised Cardiac Risk Index Silvia Carbajal)   1 point each for:     History of CVA/TIA     ___       CHF                           ___   Ischemic cardiac disease      ___   Renal insufficiency (Cr > 2.0 mg/dL)      ___  [facility] Diabetes requiring insulin          ___   Orthopedic, supra-inguinal vascular, intra-thoracic, intra-abdominal surgical site ___       0-1 = low risk  ? 2 = elevated risk    Current medications which may produce withdrawal symptoms if withheld perioperatively: none      Plan:     1.  Preoperative workup as follows: ECG, hemoglobin, hematocrit, electrolytes, creatinine, glucose, liver function studies, coagulation studies  2. Change in medication regimen before surgery: None Stop NSAIDS (motrin, aleve, ibuprofen), Vit E, aspirin, and fish oil 7-10 days before surgery unless otherwise instructed by surgeon  3. Prophylaxis for cardiac events with perioperative beta-blockers: Not indicated  ACC/AHA indications for pre-operative beta-blocker use:    · Vascular surgery with history of postitive stress test  · Intermediate or high risk surgery with history of CAD   · Intermediate or high risk surgery with multiple clinical predictors of CAD- 2 of the following: history of compensated or prior heart failure, history of cerebrovascular disease, DM, or renal insufficiency    Routine administration of higher-dose, long-acting metoprolol in beta-blockernaïve patients on the day of surgery, and in the absence of dose titration is associated with an overall increase in mortality. Beta-blockers should be started days to weeks prior to surgery and titrated to pulse < 70.  4. Deep vein thrombosis prophylaxis: After COVID-pneumonia within the past year patient had pulmonary embolism. I agree with orthopedic comment to treat with Eliquis 2.5 mg twice daily x1 month. Also will begin treatment for UTI. 5. No contraindications to planned surgery        Thank you for the referral,      HIGINIO Traore M.D., [facility]    1/10/2022    2:18 PM    Scribe attestation: I, Lucy Carlson, am scribing for and in the presence of Sha Camarena MD. Electronically signed by Xochilt Sanchez on 1/10/2022 at 2:18 PM    (Copy of consult was returned to the requesting provider on the day of completion.)

## 2022-01-11 ENCOUNTER — TELEPHONE (OUTPATIENT)
Dept: ORTHOPEDIC SURGERY | Age: 47
End: 2022-01-11

## 2022-01-13 ENCOUNTER — TELEPHONE (OUTPATIENT)
Dept: ORTHOPEDIC SURGERY | Age: 47
End: 2022-01-13

## 2022-01-13 NOTE — PROGRESS NOTES
1-13 2720 Pt's mother, Jossy Estrella, states they have Hibiclens and know how to use it. TJ video emailed to pt's mother and encouraged to watch it. Pt has Benjy Chavez, her sister, [facility],  is her guardian and will be with her DOS to sign for her. Pt's mother requests that [facility] be able to spend the night at the hospital in order to advocate for pt.  Advised her request will be forwarded to management and will let her know.  /eg

## 2022-01-13 NOTE — TELEPHONE ENCOUNTER
Spoke to Yetta Schilder to let her know that it is approved for her to stay overnight with her sister. The family was very concerned about Jamey Araujolpine staying overnight without anyone so I reached out to nursing management, and Isabel Nunn gave permission for Yetta Schilder to stay with her sister.

## 2022-01-13 NOTE — PROGRESS NOTES
Place patient label inside box (if no patient label, complete below)  Name:  :  MR#:   Hermelinda Vila / PROCEDURE  1. I (we), Robert Gregory (Patient Name) authorize Melina Chavez MD (Provider / Delmy Cook) and/or such assistants as may be selected by him/her, to perform the following operation/procedure(s): LEFT TOTAL SHOULDER REPLACEMENT VERSUS HEMIARTHROPLASTY/ BICEPS TENODESIS/ REPAIR OF RUPTURED MUSCULOTENDINOUS CUFF, CHRONIC        Note: If unable to obtain consent prior to an emergent procedure, document the emergent reason in the medical record. This procedure has been explained to my (our) satisfaction and included in the explanation was:  A) The intended benefit, nature, and extent of the procedure to be performed;  B) The significant risks involved and the probability of success;  C) Alternative procedures and methods of treatment;  D) The dangers and probable consequences of such alternatives (including no procedure or treatment); E) The expected consequences of the procedure on my future health;  F) Whether other qualified individuals would be performing important surgical tasks and/or whether  would be present to advise or support the procedure. I (we) understand that there are other risks of infection and other serious complications in the pre-operative/procedural and postoperative/procedural stages of my (our) care. I (we) have asked all of the questions which I (we) thought were important in deciding whether or not to undergo treatment or diagnosis. These questions have been answered to my (our) satisfaction. I (we) understand that no assurance can be given that the procedure will be a success, and no guarantee or warranty of success has been given to me (us).     2. It has been explained to me (us) that during the course of the operation/procedure, unforeseen conditions may be revealed that necessitate extension of the original procedure(s) or different procedure(s) than those set forth in Paragraph 1. I (we) authorize and request that the above-named physician, his/her assistants or his/her designees, perform procedures as necessary and desirable if deemed to be in my (our) best interest.     Revised 8/2/2021                                                                          Page 1 of 2                     3. I acknowledge that health care personnel may be observing this procedure for the purpose of medical education or other specified purposes as may be necessary as requested and/or approved by my (our) physician. 4. I (we) consent to the disposal by the hospital Pathologist of the removed tissue, parts or organs in accordance with hospital policy. 5. I do ____ do not ____ consent to the use of a local infiltration pain blocking agent that will be used by my provider/surgical provider to help alleviate pain during my procedure. 6. I do ____ do not ____ consent to an emergent blood transfusion in the case of a life-threatening situation that requires blood components to be administered. This consent is valid for 24 hours from the beginning of the procedure. 7. This patient does ____ or does not ____ currently have a DNR status/order. If DNR order is in place, obtain Addendum to the Surgical Consent for ALL Patients with a DNR Order to address lynda-operative status for limited intervention or DNR suspension.      8. I have read and fully understand the above Consent for Operation/Procedure and that all blanks were completed before I signed the consent.   _____________________________       _____________________      ____/____am/pm  Signature of Patient or legal representative      Printed Name / Relationship            Date / Time   ____________________________       _____________________      ____/____am/pm  Witness to Signature                                    Printed Name                    Date / Time     If patient is unable to sign or is a minor, complete the following)  Patient is a minor, ____ years of age, or unable to sign because:   ______________________________________________________________________________________________    Bowman If a phone consent is obtained, consent will be documented by using two health care professionals, each affirming that the consenting party has no questions and gives consent for the procedure discussed with the physician/provider.   _____________________          ____________________       _____/_____am/pm   2nd witness to phone consent        Printed name           Date / Time    Informed Consent:  I have provided the explanation described above in section 1 to the patient and/or legal representative.  I have provided the patient and/or legal representative with an opportunity to ask any questions about the proposed operation/procedure.   ___________________________          ____________________         ____/____am/pm  Provider / Proceduralist                            Printed name            Date / Time  Revised 8/2/2021                                                                      Page 2 of 2

## 2022-01-13 NOTE — TELEPHONE ENCOUNTER
General Question     Subject: QUESTION REGARDING SURGERY  Patient and /or Facility Request: Sergo Whittington  Contact Number: [phone]    SISTER, WHICH PATIENT'S GUARDIAN WOULD LIKE TO SPEAK TO SOMEONE REGARDING THE PATIENT'S HOSPITAL STAY. FERNANDO HAS BEEN TRYING TO CONTACT [employer] REGARDING IF SHE WILL BE ABLE TO STAY WITH HER SISTER WHILE SHE IS IN [facility]. PATIENT IS A DOWN'S PATIENT, AND SHE MIGHT BE ABLE TO UNDERSTAND AFTER SURGERY. PLEASE CALL SISTER/GUARDIAN BACK AT THE ABOVE NUMBER.

## 2022-01-13 NOTE — PROGRESS NOTES
[address] [facility] patients having surgery or anesthesia are required to be Covid tested OR to have been vaccinated at least 14 days prior to your procedure. It is very important to return our call to [phone] and notify the staff of your last vaccination date otherwise you will be required to complete Covid PCR test within the 5-6 days prior to surgery & quarantine. The results will need to be faxed to PreAdmission Testing at [phone]. PRIOR TO PROCEDURE DATE:        1. PLEASE FOLLOW ANY  GUIDELINES/ INSTRUCTIONS PRIOR TO YOUR PROCEDURE AS ADVISED BY YOUR SURGEON. 2. Arrange for someone to drive you home and be with you for the first 24 hours after discharge for your safety after your procedure for which you received sedation. Ensure it is someone we can share information with regarding your discharge. 3. You must contact your surgeon for instructions IF:   You are taking any blood thinners, aspirin, anti-inflammatory or vitamin E.   There is a change in your physical condition such as a cold, fever, rash, cuts, sores or any other infection, especially near your surgical site. 4. Do not drink alcohol the day before or day of your procedure. 5. A Pre-op History and Physical for surgery MUST be completed by your Physician or Urgent Care within 30 days of your procedure date. Please bring a copy with you on the day of your procedure and along with any other testing performed. THE DAY OF YOUR PROCEDURE:  1. Follow instructions for ARRIVAL TIME as DIRECTED BY YOUR SURGEON. 2. Enter the MAIN entrance from [address] [place] [address] and follow the signs to the free [employer] or [employer] parking (offered free of charge 6am-5pm). 3. Enter the Main Entrance of the hospital (do not enter from the lower level of the parking garage). Upon entrance, check in with the  at the main desk on your left. If no one is available at the desk, proceed into the [facility] Waiting Room and go through the door directly into the [facility]. There is a Check-in desk ACROSS from Room 5 (marked with a sign hanging from the ceiling). The phone number for the surgery center is [phone]. 4. Please call [phone] option #2 option #2 if you have not been preregistered yet. On the day of your procedure bring your insurance card and photo ID. You will be registered at your bedside once brought back to your room. 5. DO NOT EAT ANYTHING eight hours prior to your arrival for surgery. May have 8 ounces of water 4 hours prior to your arrival for surgery. NOTE: ALL Gastric, Bariatric and Bowel surgery patients MUST follow their surgeon's instructions. 6. MEDICATIONS    Take the following medications with a SMALL sip of water:    Bariatric patient's call surgeon if on diabetic medications as some need to be stopped 1 week preop   Use your usual dose of inhalers the morning of surgery. BRING your rescue inhaler with you to hospital.    Anesthesia does NOT want you to take insulin the morning of surgery. They will control your blood sugar while you are at the hospital. Please contact your ordering physician for instructions regarding your insulin the night before your procedure. If you have an insulin pump, please keep it set on basal rate. 7. Do not swallow water when brushing teeth. No gum, candy, mints or ice chips. Refrain from smoking or at least decrease the amount. 8. Dress in loose, comfortable clothing appropriate for redressing after your procedure. Do not wear jewelry (including body piercings), make-up (especially NO eye make-up), fingernail polish (NO toenail polish if foot/leg surgery), lotion, powders or metal hairclips. 9. Dentures, glasses, or contacts will need to be removed before your procedure.  Bring cases for your glasses, contacts, dentures, or hearing aids to protect them while you are in surgery. 10. If you use a CPAP, please bring it with you on the day of your procedure. 11. We recommend that valuable personal  belongings such as cash, cell phones, e-tablets or jewelry, be left at home during your stay. The hospital will not be responsible for valuables that are not secured in the hospital safe. However, if your insurance requires a co-pay, you may want to bring a method of payment, i.e. Check or credit card, if you wish to pay your co-pay the day of surgery. 12. If you are to stay overnight, you may bring a bag with personal items. Please have any large items you may need brought in by your family after your arrival to your hospital room. 15. If you have a Living Will or Durable Power of , please bring a copy on the day of your procedure. 15. With your permission, one family member may accompany you while you are being prepared for surgery. Once you are ready, additional family members may join you. HOW WE KEEP YOU SAFE and WORK TO PREVENT SURGICAL SITE INFECTIONS:  1. Health care workers should always check your ID bracelet to verify your name and birth date. You will be asked many times to state your name, date of birth, and allergies. 2. Health care workers should always clean their hands with soap or alcohol gel before providing care to you. It is okay to ask anyone if they cleaned their hands before they touch you. 3. You will be actively involved in verifying the type of procedure you are having and ensuring the correct surgical site. This will be confirmed multiple times prior to your procedure. Do NOT kasandra your surgery site UNLESS instructed to by your surgeon. 4. Do not shave or wax for 72 hours prior to procedure near your operative site. Shaving with a razor can irritate your skin and make it easier to develop an infection.  On the day of your procedure, any hair that needs to be removed near the surgical site will be clipped by a healthcare worker using a special clippers designed to avoid skin irritation. 5. When you are in the operating room, your surgical site will be cleansed with a special soap, and in most cases, you will be given an antibiotic before the surgery begins. What to expect AFTER YOUR PROCEDURE:  1. Immediately following your procedure, your will be taken to the PACU for the first phase of your recovery. Your nurse will help you recover from any potential side effects of anesthesia, such as extreme drowsiness, changes in your vital signs or breathing patterns. Nausea, headache, muscle aches, or sore throat may also occur after anesthesia. Your nurse will help you manage these potential side effects. 2. For comfort and safety, arrange to have someone at home with you for the first 24 hours after discharge. 3. You and your family will be given written instructions about your diet, activity, dressing care, medications, and return visits. 4. Once at home, should issues with nausea, pain, or bleeding occur, or should you notice any signs of infection, you should call your surgeon. 5. Always clean your hands before and after caring for your wound. Do not let your family touch your surgery site without cleaning their hands. 6. Narcotic pain medications can cause significant constipation. You may want to add a stool softener to your postoperative medication schedule or speak to your surgeon on how best to manage this SIDE EFFECT. SPECIAL INSTRUCTIONS     Thank you for allowing us to care for you. We strive to exceed your expectations in the delivery of care and service provided to you and your family. If you need to contact the [place] [address] staff for any reason, please call us at [phone]    Instructions reviewed with patient during preadmission testing phone interview.   Huyen Rodas RN.1/13/2022 .8:18 AM      ADDITIONAL EDUCATIONAL INFORMATION REVIEWED PER PHONE WITH YOU AND/OR YOUR FAMILY:  yes Hibiclens® Bathing Instructions   Yes Antibacterial Soap

## 2022-01-17 ENCOUNTER — TELEPHONE (OUTPATIENT)
Dept: ORTHOPEDIC SURGERY | Age: 47
End: 2022-01-17

## 2022-01-17 NOTE — TELEPHONE ENCOUNTER
Auth: NPR  Date: 01/19/22  Type of SX: INPATIENT  Location: THE [facility]  CPT: [id number], [id number], [id number]  SX area:L SHOULDER  Insurance: MEDICARE A&B

## 2022-01-18 ENCOUNTER — ANESTHESIA EVENT (OUTPATIENT)
Dept: OPERATING ROOM | Age: 47
End: 2022-01-18
Payer: MEDICARE

## 2022-01-19 ENCOUNTER — HOSPITAL ENCOUNTER (OUTPATIENT)
Age: 47
Setting detail: OBSERVATION
Discharge: HOME | End: 2022-01-19
Attending: ORTHOPAEDIC SURGERY | Admitting: ORTHOPAEDIC SURGERY
Payer: MEDICARE

## 2022-01-19 ENCOUNTER — APPOINTMENT (OUTPATIENT)
Dept: GENERAL RADIOLOGY | Age: 47
End: 2022-01-19
Attending: ORTHOPAEDIC SURGERY
Payer: MEDICARE

## 2022-01-19 ENCOUNTER — ANESTHESIA (OUTPATIENT)
Dept: OPERATING ROOM | Age: 47
End: 2022-01-19
Payer: MEDICARE

## 2022-01-19 VITALS
HEIGHT: 57 IN | WEIGHT: 148 LBS | RESPIRATION RATE: 13 BRPM | TEMPERATURE: 97 F | HEART RATE: 76 BPM | BODY MASS INDEX: 31.93 KG/M2 | SYSTOLIC BLOOD PRESSURE: 119 MMHG | OXYGEN SATURATION: 98 % | DIASTOLIC BLOOD PRESSURE: 73 MMHG

## 2022-01-19 VITALS — SYSTOLIC BLOOD PRESSURE: 81 MMHG | OXYGEN SATURATION: 100 % | TEMPERATURE: 95.9 F | DIASTOLIC BLOOD PRESSURE: 61 MMHG

## 2022-01-19 DIAGNOSIS — M75.42 ROTATOR CUFF IMPINGEMENT SYNDROME OF LEFT SHOULDER: Primary | ICD-10-CM

## 2022-01-19 DIAGNOSIS — M87.00 AVN (AVASCULAR NECROSIS OF BONE) (HCC): ICD-10-CM

## 2022-01-19 LAB
ABO/RH: NORMAL
ANTIBODY SCREEN: NORMAL
PREGNANCY, URINE: NEGATIVE

## 2022-01-19 PROCEDURE — 2709999900 HC NON-CHARGEABLE SUPPLY: Performed by: ORTHOPAEDIC SURGERY

## 2022-01-19 PROCEDURE — 2580000003 HC RX 258: Performed by: ANESTHESIOLOGY

## 2022-01-19 PROCEDURE — 3600000014 HC SURGERY LEVEL 4 ADDTL 15MIN: Performed by: ORTHOPAEDIC SURGERY

## 2022-01-19 PROCEDURE — 7100000010 HC PHASE II RECOVERY - FIRST 15 MIN: Performed by: ORTHOPAEDIC SURGERY

## 2022-01-19 PROCEDURE — 86901 BLOOD TYPING SEROLOGIC RH(D): CPT

## 2022-01-19 PROCEDURE — 97166 OT EVAL MOD COMPLEX 45 MIN: CPT

## 2022-01-19 PROCEDURE — 76942 ECHO GUIDE FOR BIOPSY: CPT | Performed by: ANESTHESIOLOGY

## 2022-01-19 PROCEDURE — 23470 RECONSTRUCT SHOULDER JOINT: CPT | Performed by: ORTHOPAEDIC SURGERY

## 2022-01-19 PROCEDURE — 6370000000 HC RX 637 (ALT 250 FOR IP): Performed by: PHYSICIAN ASSISTANT

## 2022-01-19 PROCEDURE — 3700000001 HC ADD 15 MINUTES (ANESTHESIA): Performed by: ORTHOPAEDIC SURGERY

## 2022-01-19 PROCEDURE — 97530 THERAPEUTIC ACTIVITIES: CPT

## 2022-01-19 PROCEDURE — 97161 PT EVAL LOW COMPLEX 20 MIN: CPT

## 2022-01-19 PROCEDURE — 97535 SELF CARE MNGMENT TRAINING: CPT

## 2022-01-19 PROCEDURE — 86850 RBC ANTIBODY SCREEN: CPT

## 2022-01-19 PROCEDURE — 6360000002 HC RX W HCPCS: Performed by: NURSE ANESTHETIST, CERTIFIED REGISTERED

## 2022-01-19 PROCEDURE — 86900 BLOOD TYPING SEROLOGIC ABO: CPT

## 2022-01-19 PROCEDURE — 7100000011 HC PHASE II RECOVERY - ADDTL 15 MIN: Performed by: ORTHOPAEDIC SURGERY

## 2022-01-19 PROCEDURE — 2720000010 HC SURG SUPPLY STERILE: Performed by: ORTHOPAEDIC SURGERY

## 2022-01-19 PROCEDURE — C1776 JOINT DEVICE (IMPLANTABLE): HCPCS | Performed by: ORTHOPAEDIC SURGERY

## 2022-01-19 PROCEDURE — 73020 X-RAY EXAM OF SHOULDER: CPT

## 2022-01-19 PROCEDURE — 2500000003 HC RX 250 WO HCPCS: Performed by: PHYSICIAN ASSISTANT

## 2022-01-19 PROCEDURE — A4217 STERILE WATER/SALINE, 500 ML: HCPCS | Performed by: ORTHOPAEDIC SURGERY

## 2022-01-19 PROCEDURE — 2500000003 HC RX 250 WO HCPCS: Performed by: ANESTHESIOLOGY

## 2022-01-19 PROCEDURE — 6360000002 HC RX W HCPCS: Performed by: ANESTHESIOLOGY

## 2022-01-19 PROCEDURE — 2500000003 HC RX 250 WO HCPCS: Performed by: NURSE ANESTHETIST, CERTIFIED REGISTERED

## 2022-01-19 PROCEDURE — 2580000003 HC RX 258: Performed by: PHYSICIAN ASSISTANT

## 2022-01-19 PROCEDURE — 3700000000 HC ANESTHESIA ATTENDED CARE: Performed by: ORTHOPAEDIC SURGERY

## 2022-01-19 PROCEDURE — 7100000001 HC PACU RECOVERY - ADDTL 15 MIN: Performed by: ORTHOPAEDIC SURGERY

## 2022-01-19 PROCEDURE — 3600000004 HC SURGERY LEVEL 4 BASE: Performed by: ORTHOPAEDIC SURGERY

## 2022-01-19 PROCEDURE — 2580000003 HC RX 258: Performed by: ORTHOPAEDIC SURGERY

## 2022-01-19 PROCEDURE — 6360000002 HC RX W HCPCS: Performed by: ORTHOPAEDIC SURGERY

## 2022-01-19 PROCEDURE — C9290 INJ, BUPIVACAINE LIPOSOME: HCPCS | Performed by: ANESTHESIOLOGY

## 2022-01-19 PROCEDURE — 23470 RECONSTRUCT SHOULDER JOINT: CPT | Performed by: PHYSICIAN ASSISTANT

## 2022-01-19 PROCEDURE — 7100000000 HC PACU RECOVERY - FIRST 15 MIN: Performed by: ORTHOPAEDIC SURGERY

## 2022-01-19 PROCEDURE — 84703 CHORIONIC GONADOTROPIN ASSAY: CPT

## 2022-01-19 PROCEDURE — 99024 POSTOP FOLLOW-UP VISIT: CPT | Performed by: PHYSICIAN ASSISTANT

## 2022-01-19 DEVICE — DOUBLE TAPER TA6V +0 MM 0°
Type: IMPLANTABLE DEVICE | Site: SHOULDER | Status: FUNCTIONAL
Brand: HUMELOCK II CEMENTED SHOULDER SYSTEM

## 2022-01-19 RX ORDER — LIDOCAINE HYDROCHLORIDE 10 MG/ML
INJECTION, SOLUTION INFILTRATION; PERINEURAL PRN
Status: DISCONTINUED | OUTPATIENT
Start: 2022-01-19 | End: 2022-01-19 | Stop reason: SDUPTHER

## 2022-01-19 RX ORDER — SODIUM CHLORIDE 0.9 % (FLUSH) 0.9 %
5-40 SYRINGE (ML) INJECTION PRN
Status: CANCELLED | OUTPATIENT
Start: 2022-01-19

## 2022-01-19 RX ORDER — OXYCODONE HYDROCHLORIDE 5 MG/1
10 TABLET ORAL PRN
Status: DISCONTINUED | OUTPATIENT
Start: 2022-01-19 | End: 2022-01-19 | Stop reason: HOSPADM

## 2022-01-19 RX ORDER — BUPIVACAINE HYDROCHLORIDE 5 MG/ML
INJECTION, SOLUTION EPIDURAL; INTRACAUDAL
Status: COMPLETED | OUTPATIENT
Start: 2022-01-19 | End: 2022-01-19

## 2022-01-19 RX ORDER — FENTANYL CITRATE 50 UG/ML
100 INJECTION, SOLUTION INTRAMUSCULAR; INTRAVENOUS ONCE
Status: COMPLETED | OUTPATIENT
Start: 2022-01-19 | End: 2022-01-19

## 2022-01-19 RX ORDER — SODIUM CHLORIDE 0.9 % (FLUSH) 0.9 %
5-40 SYRINGE (ML) INJECTION EVERY 12 HOURS SCHEDULED
Status: CANCELLED | OUTPATIENT
Start: 2022-01-19

## 2022-01-19 RX ORDER — ONDANSETRON 4 MG/1
4 TABLET, FILM COATED ORAL 3 TIMES DAILY PRN
Qty: 15 TABLET | Refills: 0 | Status: SHIPPED | OUTPATIENT
Start: 2022-01-19 | End: 2022-06-04

## 2022-01-19 RX ORDER — OXYCODONE HYDROCHLORIDE 5 MG/1
5 TABLET ORAL EVERY 4 HOURS PRN
Status: CANCELLED | OUTPATIENT
Start: 2022-01-19

## 2022-01-19 RX ORDER — SODIUM CHLORIDE, SODIUM LACTATE, POTASSIUM CHLORIDE, CALCIUM CHLORIDE 600; 310; 30; 20 MG/100ML; MG/100ML; MG/100ML; MG/100ML
INJECTION, SOLUTION INTRAVENOUS CONTINUOUS
Status: DISCONTINUED | OUTPATIENT
Start: 2022-01-19 | End: 2022-01-19 | Stop reason: HOSPADM

## 2022-01-19 RX ORDER — GLYCOPYRROLATE 0.2 MG/ML
INJECTION INTRAMUSCULAR; INTRAVENOUS PRN
Status: DISCONTINUED | OUTPATIENT
Start: 2022-01-19 | End: 2022-01-19 | Stop reason: SDUPTHER

## 2022-01-19 RX ORDER — ONDANSETRON 2 MG/ML
INJECTION INTRAMUSCULAR; INTRAVENOUS PRN
Status: DISCONTINUED | OUTPATIENT
Start: 2022-01-19 | End: 2022-01-19 | Stop reason: SDUPTHER

## 2022-01-19 RX ORDER — METOCLOPRAMIDE HYDROCHLORIDE 5 MG/ML
10 INJECTION INTRAMUSCULAR; INTRAVENOUS
Status: DISCONTINUED | OUTPATIENT
Start: 2022-01-19 | End: 2022-01-19 | Stop reason: HOSPADM

## 2022-01-19 RX ORDER — SODIUM CHLORIDE 9 MG/ML
25 INJECTION, SOLUTION INTRAVENOUS PRN
Status: CANCELLED | OUTPATIENT
Start: 2022-01-19

## 2022-01-19 RX ORDER — SODIUM CHLORIDE, SODIUM LACTATE, POTASSIUM CHLORIDE, CALCIUM CHLORIDE 600; 310; 30; 20 MG/100ML; MG/100ML; MG/100ML; MG/100ML
INJECTION, SOLUTION INTRAVENOUS CONTINUOUS
Status: CANCELLED | OUTPATIENT
Start: 2022-01-19

## 2022-01-19 RX ORDER — LABETALOL HYDROCHLORIDE 5 MG/ML
5 INJECTION, SOLUTION INTRAVENOUS EVERY 10 MIN PRN
Status: DISCONTINUED | OUTPATIENT
Start: 2022-01-19 | End: 2022-01-19 | Stop reason: HOSPADM

## 2022-01-19 RX ORDER — PROMETHAZINE HYDROCHLORIDE 25 MG/ML
6.25 INJECTION, SOLUTION INTRAMUSCULAR; INTRAVENOUS
Status: DISCONTINUED | OUTPATIENT
Start: 2022-01-19 | End: 2022-01-19 | Stop reason: HOSPADM

## 2022-01-19 RX ORDER — MORPHINE SULFATE 4 MG/ML
2 INJECTION, SOLUTION INTRAMUSCULAR; INTRAVENOUS EVERY 5 MIN PRN
Status: DISCONTINUED | OUTPATIENT
Start: 2022-01-19 | End: 2022-01-19 | Stop reason: HOSPADM

## 2022-01-19 RX ORDER — DIPHENHYDRAMINE HYDROCHLORIDE 50 MG/ML
12.5 INJECTION INTRAMUSCULAR; INTRAVENOUS
Status: DISCONTINUED | OUTPATIENT
Start: 2022-01-19 | End: 2022-01-19 | Stop reason: HOSPADM

## 2022-01-19 RX ORDER — BUPIVACAINE HYDROCHLORIDE 5 MG/ML
30 INJECTION, SOLUTION EPIDURAL; INTRACAUDAL ONCE
Status: DISCONTINUED | OUTPATIENT
Start: 2022-01-19 | End: 2022-01-19 | Stop reason: HOSPADM

## 2022-01-19 RX ORDER — ACETAMINOPHEN 325 MG/1
650 TABLET ORAL EVERY 6 HOURS
Status: CANCELLED | OUTPATIENT
Start: 2022-01-19

## 2022-01-19 RX ORDER — PHENYLEPHRINE HCL IN 0.9% NACL 1 MG/10 ML
SYRINGE (ML) INTRAVENOUS PRN
Status: DISCONTINUED | OUTPATIENT
Start: 2022-01-19 | End: 2022-01-19 | Stop reason: SDUPTHER

## 2022-01-19 RX ORDER — OXYCODONE HYDROCHLORIDE 5 MG/1
10 TABLET ORAL EVERY 4 HOURS PRN
Status: CANCELLED | OUTPATIENT
Start: 2022-01-19

## 2022-01-19 RX ORDER — EPHEDRINE SULFATE 50 MG/ML
INJECTION INTRAVENOUS PRN
Status: DISCONTINUED | OUTPATIENT
Start: 2022-01-19 | End: 2022-01-19 | Stop reason: SDUPTHER

## 2022-01-19 RX ORDER — DEXAMETHASONE SODIUM PHOSPHATE 4 MG/ML
INJECTION, SOLUTION INTRA-ARTICULAR; INTRALESIONAL; INTRAMUSCULAR; INTRAVENOUS; SOFT TISSUE PRN
Status: DISCONTINUED | OUTPATIENT
Start: 2022-01-19 | End: 2022-01-19 | Stop reason: SDUPTHER

## 2022-01-19 RX ORDER — MORPHINE SULFATE 4 MG/ML
2 INJECTION, SOLUTION INTRAMUSCULAR; INTRAVENOUS
Status: CANCELLED | OUTPATIENT
Start: 2022-01-19

## 2022-01-19 RX ORDER — CYCLOBENZAPRINE HCL 10 MG
10 TABLET ORAL 3 TIMES DAILY PRN
Qty: 30 TABLET | Refills: 0 | Status: SHIPPED | OUTPATIENT
Start: 2022-01-19 | End: 2022-01-29

## 2022-01-19 RX ORDER — MAGNESIUM HYDROXIDE 1200 MG/15ML
LIQUID ORAL CONTINUOUS PRN
Status: COMPLETED | OUTPATIENT
Start: 2022-01-19 | End: 2022-01-19

## 2022-01-19 RX ORDER — ONDANSETRON 4 MG/1
4 TABLET, ORALLY DISINTEGRATING ORAL EVERY 8 HOURS PRN
Status: CANCELLED | OUTPATIENT
Start: 2022-01-19

## 2022-01-19 RX ORDER — OXYCODONE HYDROCHLORIDE 5 MG/1
5 TABLET ORAL EVERY 6 HOURS PRN
Qty: 28 TABLET | Refills: 0 | Status: SHIPPED | OUTPATIENT
Start: 2022-01-19 | End: 2022-01-26

## 2022-01-19 RX ORDER — ONDANSETRON 2 MG/ML
4 INJECTION INTRAMUSCULAR; INTRAVENOUS EVERY 6 HOURS PRN
Status: CANCELLED | OUTPATIENT
Start: 2022-01-19

## 2022-01-19 RX ORDER — PROPOFOL 10 MG/ML
INJECTION, EMULSION INTRAVENOUS PRN
Status: DISCONTINUED | OUTPATIENT
Start: 2022-01-19 | End: 2022-01-19 | Stop reason: SDUPTHER

## 2022-01-19 RX ORDER — VANCOMYCIN HYDROCHLORIDE 1 G/20ML
INJECTION, POWDER, LYOPHILIZED, FOR SOLUTION INTRAVENOUS PRN
Status: DISCONTINUED | OUTPATIENT
Start: 2022-01-19 | End: 2022-01-19 | Stop reason: ALTCHOICE

## 2022-01-19 RX ORDER — ROCURONIUM BROMIDE 10 MG/ML
INJECTION, SOLUTION INTRAVENOUS PRN
Status: DISCONTINUED | OUTPATIENT
Start: 2022-01-19 | End: 2022-01-19 | Stop reason: SDUPTHER

## 2022-01-19 RX ORDER — PREGABALIN 75 MG/1
75 CAPSULE ORAL ONCE
Status: COMPLETED | OUTPATIENT
Start: 2022-01-19 | End: 2022-01-19

## 2022-01-19 RX ORDER — MIDAZOLAM HYDROCHLORIDE 1 MG/ML
2 INJECTION INTRAMUSCULAR; INTRAVENOUS ONCE
Status: COMPLETED | OUTPATIENT
Start: 2022-01-19 | End: 2022-01-19

## 2022-01-19 RX ORDER — MORPHINE SULFATE 4 MG/ML
4 INJECTION, SOLUTION INTRAMUSCULAR; INTRAVENOUS
Status: CANCELLED | OUTPATIENT
Start: 2022-01-19

## 2022-01-19 RX ORDER — OXYCODONE HYDROCHLORIDE 5 MG/1
5 TABLET ORAL PRN
Status: DISCONTINUED | OUTPATIENT
Start: 2022-01-19 | End: 2022-01-19 | Stop reason: HOSPADM

## 2022-01-19 RX ORDER — HYDRALAZINE HYDROCHLORIDE 20 MG/ML
5 INJECTION INTRAMUSCULAR; INTRAVENOUS EVERY 10 MIN PRN
Status: DISCONTINUED | OUTPATIENT
Start: 2022-01-19 | End: 2022-01-19 | Stop reason: HOSPADM

## 2022-01-19 RX ORDER — MEPERIDINE HYDROCHLORIDE 25 MG/ML
12.5 INJECTION INTRAMUSCULAR; INTRAVENOUS; SUBCUTANEOUS EVERY 5 MIN PRN
Status: DISCONTINUED | OUTPATIENT
Start: 2022-01-19 | End: 2022-01-19 | Stop reason: HOSPADM

## 2022-01-19 RX ORDER — CEPHALEXIN 500 MG/1
500 CAPSULE ORAL 4 TIMES DAILY
Qty: 4 CAPSULE | Refills: 0 | Status: SHIPPED | OUTPATIENT
Start: 2022-01-19 | End: 2022-06-04

## 2022-01-19 RX ADMIN — Medication 100 MCG: at 12:45

## 2022-01-19 RX ADMIN — PREGABALIN 75 MG: 75 CAPSULE ORAL at 10:23

## 2022-01-19 RX ADMIN — DEXAMETHASONE SODIUM PHOSPHATE 4 MG: 4 INJECTION, SOLUTION INTRAMUSCULAR; INTRAVENOUS at 12:41

## 2022-01-19 RX ADMIN — EPHEDRINE SULFATE 5 MG: 50 INJECTION INTRAVENOUS at 12:59

## 2022-01-19 RX ADMIN — Medication 100 MCG: at 13:36

## 2022-01-19 RX ADMIN — BUPIVACAINE HYDROCHLORIDE 10 ML: 5 INJECTION, SOLUTION EPIDURAL; INTRACAUDAL; PERINEURAL at 11:21

## 2022-01-19 RX ADMIN — Medication 100 MCG: at 13:46

## 2022-01-19 RX ADMIN — Medication 100 MCG: at 13:00

## 2022-01-19 RX ADMIN — Medication 100 MCG: at 12:34

## 2022-01-19 RX ADMIN — ROCURONIUM BROMIDE 10 MG: 10 INJECTION INTRAVENOUS at 13:15

## 2022-01-19 RX ADMIN — Medication 100 MCG: at 12:56

## 2022-01-19 RX ADMIN — ONDANSETRON 4 MG: 2 INJECTION INTRAMUSCULAR; INTRAVENOUS at 12:44

## 2022-01-19 RX ADMIN — MIDAZOLAM HYDROCHLORIDE 2 MG: 2 INJECTION, SOLUTION INTRAMUSCULAR; INTRAVENOUS at 11:16

## 2022-01-19 RX ADMIN — Medication 50 MCG: at 14:18

## 2022-01-19 RX ADMIN — SUGAMMADEX 200 MG: 100 INJECTION, SOLUTION INTRAVENOUS at 14:26

## 2022-01-19 RX ADMIN — LIDOCAINE HYDROCHLORIDE 40 MG: 10 INJECTION, SOLUTION INFILTRATION; PERINEURAL at 12:20

## 2022-01-19 RX ADMIN — Medication 50 MCG: at 14:27

## 2022-01-19 RX ADMIN — SODIUM CHLORIDE, POTASSIUM CHLORIDE, SODIUM LACTATE AND CALCIUM CHLORIDE: 600; 310; 30; 20 INJECTION, SOLUTION INTRAVENOUS at 10:20

## 2022-01-19 RX ADMIN — Medication 100 MCG: at 14:06

## 2022-01-19 RX ADMIN — Medication 100 MCG: at 12:50

## 2022-01-19 RX ADMIN — FENTANYL CITRATE 25 MCG: 50 INJECTION INTRAMUSCULAR; INTRAVENOUS at 13:32

## 2022-01-19 RX ADMIN — FENTANYL CITRATE 100 MCG: 50 INJECTION INTRAMUSCULAR; INTRAVENOUS at 11:16

## 2022-01-19 RX ADMIN — Medication 100 MCG: at 12:40

## 2022-01-19 RX ADMIN — TRANEXAMIC ACID 1000 MG: 1 INJECTION, SOLUTION INTRAVENOUS at 12:30

## 2022-01-19 RX ADMIN — Medication 50 MCG: at 13:29

## 2022-01-19 RX ADMIN — Medication 50 MCG: at 13:21

## 2022-01-19 RX ADMIN — PROPOFOL 120 MG: 10 INJECTION, EMULSION INTRAVENOUS at 12:20

## 2022-01-19 RX ADMIN — GLYCOPYRROLATE 0.2 MG: 0.2 INJECTION INTRAMUSCULAR; INTRAVENOUS at 12:54

## 2022-01-19 RX ADMIN — Medication 100 MCG: at 13:51

## 2022-01-19 RX ADMIN — ROCURONIUM BROMIDE 50 MG: 10 INJECTION INTRAVENOUS at 12:20

## 2022-01-19 RX ADMIN — BUPIVACAINE 20 ML: 13.3 INJECTION, SUSPENSION, LIPOSOMAL INFILTRATION at 11:21

## 2022-01-19 RX ADMIN — EPHEDRINE SULFATE 5 MG: 50 INJECTION INTRAVENOUS at 13:02

## 2022-01-19 ASSESSMENT — PULMONARY FUNCTION TESTS
PIF_VALUE: 19
PIF_VALUE: 20
PIF_VALUE: 20
PIF_VALUE: 16
PIF_VALUE: 15
PIF_VALUE: 1
PIF_VALUE: 21
PIF_VALUE: 8
PIF_VALUE: 20
PIF_VALUE: 22
PIF_VALUE: 12
PIF_VALUE: 21
PIF_VALUE: 20
PIF_VALUE: 20
PIF_VALUE: 19
PIF_VALUE: 20
PIF_VALUE: 21
PIF_VALUE: 20
PIF_VALUE: 20
PIF_VALUE: 19
PIF_VALUE: 20
PIF_VALUE: 11
PIF_VALUE: 21
PIF_VALUE: 24
PIF_VALUE: 22
PIF_VALUE: 19
PIF_VALUE: 21
PIF_VALUE: 20
PIF_VALUE: 20
PIF_VALUE: 1
PIF_VALUE: 20
PIF_VALUE: 23
PIF_VALUE: 19
PIF_VALUE: 22
PIF_VALUE: 21
PIF_VALUE: 20
PIF_VALUE: 22
PIF_VALUE: 23
PIF_VALUE: 22
PIF_VALUE: 21
PIF_VALUE: 20
PIF_VALUE: 19
PIF_VALUE: 20
PIF_VALUE: 18
PIF_VALUE: 22
PIF_VALUE: 1
PIF_VALUE: 19
PIF_VALUE: 21
PIF_VALUE: 22
PIF_VALUE: 20
PIF_VALUE: 21
PIF_VALUE: 15
PIF_VALUE: 20
PIF_VALUE: 22
PIF_VALUE: 21
PIF_VALUE: 20
PIF_VALUE: 20
PIF_VALUE: 21
PIF_VALUE: 23
PIF_VALUE: 20
PIF_VALUE: 22
PIF_VALUE: 22
PIF_VALUE: 21
PIF_VALUE: 21
PIF_VALUE: 19
PIF_VALUE: 18
PIF_VALUE: 11
PIF_VALUE: 21
PIF_VALUE: 22
PIF_VALUE: 13
PIF_VALUE: 21
PIF_VALUE: 20
PIF_VALUE: 20
PIF_VALUE: 12
PIF_VALUE: 1
PIF_VALUE: 20
PIF_VALUE: 15
PIF_VALUE: 19
PIF_VALUE: 20
PIF_VALUE: 19
PIF_VALUE: 23
PIF_VALUE: 19
PIF_VALUE: 19
PIF_VALUE: 22
PIF_VALUE: 21
PIF_VALUE: 22
PIF_VALUE: 21
PIF_VALUE: 22
PIF_VALUE: 22
PIF_VALUE: 20
PIF_VALUE: 21
PIF_VALUE: 19
PIF_VALUE: 13
PIF_VALUE: 22
PIF_VALUE: 22
PIF_VALUE: 21
PIF_VALUE: 20
PIF_VALUE: 21
PIF_VALUE: 22
PIF_VALUE: 16
PIF_VALUE: 19
PIF_VALUE: 21
PIF_VALUE: 22
PIF_VALUE: 23
PIF_VALUE: 21
PIF_VALUE: 21
PIF_VALUE: 15
PIF_VALUE: 19
PIF_VALUE: 21
PIF_VALUE: 20
PIF_VALUE: 20
PIF_VALUE: 21
PIF_VALUE: 3
PIF_VALUE: 19
PIF_VALUE: 21
PIF_VALUE: 22
PIF_VALUE: 21
PIF_VALUE: 19
PIF_VALUE: 21
PIF_VALUE: 22
PIF_VALUE: 21
PIF_VALUE: 20
PIF_VALUE: 20
PIF_VALUE: 21
PIF_VALUE: 22
PIF_VALUE: 20
PIF_VALUE: 20
PIF_VALUE: 13

## 2022-01-19 ASSESSMENT — PAIN SCALES - GENERAL
PAINLEVEL_OUTOF10: 0

## 2022-01-19 ASSESSMENT — PAIN - FUNCTIONAL ASSESSMENT: PAIN_FUNCTIONAL_ASSESSMENT: 0-10

## 2022-01-19 NOTE — PROGRESS NOTES
Occupational Therapy   Occupational Therapy Initial Assessment and Treatment    Date: 2022   Patient Name: Esthela Taylor  MRN: [id number]     : 1975    Date of Service: 2022    Discharge Recommendations:  Esthela Taylor scored a 14/24 on the AM-PAC ADL Inpatient form. Current research shows that an AM-PAC score of 18 or greater is typically associated with a discharge to the patient's home setting. Based on the patient's AM-PAC score, and their current ADL deficits, it is recommended that the patient have 2-3 sessions per week of Occupational Therapy at d/c to increase the patient's independence. At this time, this patient demonstrates the endurance and safety to discharge home with home services and a follow up treatment frequency of 2-3x/wk. Please see assessment section for further patient specific details. If patient discharges prior to next session this note will serve as a discharge summary. Please see below for the latest assessment towards goals. OT Equipment Recommendations  Equipment Needed: No    Assessment   Performance deficits / Impairments: Decreased functional mobility ; Decreased ADL status; Decreased balance  Assessment: Seen POD#0 s/p L TSR. Pt is requiring Max A for UB/LB dressing tasks and requiring Min A for transfers and standing balance. Pt c/o some nausea and belching several times during session. Sister (POA) at bedside indicating she/family intend to provide close 24 hr ASSIST at home and have educated pt she will not be walking/dressing/showering alone. Illustrated/written handouts provided breaking down modified ADL techniques and shoulder immobilizer instructions. Sister indicating understanding. No goals set as pt to discharge home from PACU. Anticipate pt will make functional progress as she comes out of anesthesia. Sister (caregiver) educated about gait belt, which will go home on pt. No DME needs.   Decision Making: Medium Complexity  OT Education: OT Role;Plan of Care;ADL Adaptive Strategies;Transfer Training;Precautions  Patient Education: handouts provided for caregiver reference - denies questions  Barriers to Learning: down syndrome and nausea at time of evaluation  REQUIRES OT FOLLOW UP: No  Activity Tolerance  Activity Tolerance: tolerated well; belching during treatment and c/o some nausea  Safety Devices  Safety Devices in place: Yes  Type of devices: Nurse notified; Left in bed (sister and nurse at bedside)           Patient Diagnosis(es): The primary encounter diagnosis was Rotator cuff impingement syndrome of left shoulder. A diagnosis of AVN (avascular necrosis of bone) ([facility]) was also pertinent to this visit. has a past medical history of COVID-19, Down syndrome, Hx of blood clots, and Influenza A.   has a past surgical history that includes Foot surgery (5/26/11). Restrictions  Position Activity Restriction  Other position/activity restrictions: shoulder immobilizer (Simultaneous filing. User may not have seen previous data.)    Subjective   General  Chart Reviewed: Yes  Additional Pertinent Hx: 42 y.o. F to OR 1/19 for LEFT SHOULDER HEMIARTHROPLASTY/ BICEPS TENODESIS/ REPAIR OF RUPTURED MUSCULOTENDINOUS CUFF, CHRONIC.    PMH: COVID 2020, Down Syndrome, Foot Sx (2011). Family / Caregiver Present: Yes (sister (POA))  Referring Practitioner: Dr. Karel Cutler  Diagnosis: Primary OA L Shoulder    Subjective  Subjective: In bed on entry. \"I'm okay. \"    Patient Currently in Pain: Denies      Social/Functional History  Social/Functional History  Lives With:  (mother and father)  Type of Home: House  Home Layout: Bed/Bath upstairs,Performs ADL's on one level (planning on staying on first level of home)  Home Access:  (1 YVONNE w/o rail)  Bathroom Shower/Tub: Walk-in shower  Bathroom Toilet: Standard (leverage next toilet)  Bathroom Equipment: Hand-held shower,Shower chair  Bathroom Accessibility: Cayden Finnegan:  (planning on sleeping on recliner at discharged)  Cr [place] Help From: Family  ADL Assistance: Independent  Homemaking Assistance: Needs assistance  Homemaking Responsibilities: No  Ambulation Assistance: Independent  Transfer Assistance: Independent  Active : No  Occupation: Part time employment  Additional Comments: pt has very supportive family that will assist with all ADLs and mobility as needed. will be available 24/7       Objective   Vision: Within Functional Limits  Hearing: Within functional limits      Orientation  Overall Orientation Status: Within Functional Limits (oriented w/ conversation)        Balance  Sitting Balance: Stand by assistance  Standing Balance: Minimal assistance (CG/Min A)    Functional Mobility  Functional - Mobility Device: Other (hand held assist)  Activity: Other (~3' towards [facility])  Assist Level: Minimal assistance    ADL  UE Dressing: Maximum assistance (don overhead shirt, don/doff shoulder immobilizer)  LE Dressing: Maximum assistance (underwear/pants - assist to thread both feet, steadying assist in standing, assist to pull pants over L hip)  Toileting: Moderate assistance (in standing, steadying assist and assist to pullup underwear/pants over L hip)     Quality of Movement Other  Comment: LUE numbness s/p surgery; RUE WFL        Bed mobility  Supine to Sit: Stand by assistance  Sit to Supine: Stand by assistance  Comment: HOB elevated, increased time to complete     Transfers  Sit to stand: Minimal assistance  Stand to sit: Contact guard assistance        Cognition  Cognition Comment: pleasant, cooperative, follows simple directions w/ repetition; appearing drowsy and nauseated during evaluation time - ? full comprehension/retention of materials taught (sister, caregiver, at bedside indicating understanding of materials)                    LUE AROM (degrees)  LUE General AROM: NT - shoulder immobilizer (LUE numb - pt able to wiggle fingers minimally).  Sister at bedside indicating she is aware of need to do wrist and elbow ROM (\"under close supervision\"). RUE AROM (degrees)  RUE AROM : WFL           Hand Dominance  Hand Dominance: Left         Pt seen by OT for eval and treat. Treatment included: bed mobility, functional transfer, ADL, pt education         Plan   Discharge acute OT - to discharge home from PACU. No goals set.                                                       AM-PAC Score        AM-PAC Inpatient Daily Activity Raw Score: 14 (01/19/22 1653)  AM-PAC Inpatient ADL T-Scale Score : 33.39 (01/19/22 1653)  ADL Inpatient CMS 0-100% Score: 59.67 (01/19/22 1653)  ADL Inpatient CMS G-Code Modifier : CK (01/19/22 1653)              Therapy Time   Individual Concurrent Group Co-treatment   Time In 1600         Time Out 1645         Minutes 45              Timed Code Treatment Minutes:   30    Total Treatment Minutes:  [address] [facility] OTR/L #[id number]

## 2022-01-19 NOTE — PROGRESS NOTES
Patient arrived from OR to PACU#3  post LEFT SHOULDER HEMIARTHROPLASTY/ BICEPS TENODESIS/ REPAIR OF RUPTURED MUSCULOTENDINOUS CUFF, CHRONIC of Dr. Karel Cutler. Patient is placed on cardiac monitor. Report is given per CRNA. Per report, patient was stable during procedure. On arrival, patient is arousable and no signs of pain.

## 2022-01-19 NOTE — ANESTHESIA POSTPROCEDURE EVALUATION
Department of Anesthesiology  Postprocedure Note    Patient: Huma Pollock  MRN: [id number]  YOB: 1975  Date of evaluation: 1/19/2022  Time:  5:00 PM     Procedure Summary     Date: 01/19/22 Room / Location: [facility] [address] [place] [address] / [facility]    Anesthesia Start: 1216 Anesthesia Stop: 1450    Procedure: LEFT SHOULDER HEMIARTHROPLASTY/ BICEPS TENODESIS/ REPAIR OF RUPTURED MUSCULOTENDINOUS CUFF, CHRONIC (Left ) Diagnosis:       Primary osteoarthritis, left shoulder      Incomplete rotator cuff tear or rupture of left shoulder, not specified as traumatic      Avascular necrosis (HCC)      (Primary osteoarthritis, left shoulder [M19.012] Incomplete rotator cuff tear or rupture of left shoulder, not specified as traumatic [M75.112] Avascular necrosis ([facility] [address] [M87.00])    Surgeons: Paolo Cabral MD Responsible Provider: Jg De Jesus MD    Anesthesia Type: general ASA Status: 3          Anesthesia Type: general    Wong Phase I: Wong Score: 10    Wong Phase II:      Last vitals: Reviewed and per EMR flowsheets.        Anesthesia Post Evaluation    Patient location during evaluation: PACU  Patient participation: complete - patient participated  Level of consciousness: awake and alert  Airway patency: patent  Nausea & Vomiting: no nausea and no vomiting  Complications: no  Cardiovascular status: hemodynamically stable  Respiratory status: acceptable  Hydration status: euvolemic  Multimodal analgesia pain management approach

## 2022-01-19 NOTE — OP NOTE
Orthopaedic Surgery  Operative Report      Patient Name:  Kimberlyn Hernández  Patient :  1975  MRN: [id number]    Date: 22     Pre-operative Diagnosis:   Avascular necrosis Left proximal humerus  M75.112 Incomplete rotator cuff tear or rupture of left shoulder, not specified as traumatic    Post-operative Diagnosis:    Same    Procedure: LEFT  58034 Hemiarthroplasty Left shoulder - modifier   57699 Tenodesis of long tendon of biceps  56200 Repair of ruptured musculotendinous cuff; chronic    Surgeon:  Surgeon(s) and Role:     * Shaggy Oquendo MD - Primary    Assistant: Circulator: Jeanne Martines RN  Surgical Assistant: Mady Friday  Relief Circulator: Argentina Young RN  Relief Scrub: Lupe Cox  Scrub Person First: Richelle Arthur  Physician Assistant (first assist): Cris Koehler PA-C    Anesthesia: General endotracheal anesthesia, Intraoperative shoulder block Ropivacaine - supraspinatus, axillary, lynda-incisional and Regional with interscalene nerve block preop    Estimated blood loss: 100    Specimens: * No specimens in log *    Complications: None    Drains: None    Condition: Stable    Implants:   Implant Name Type Inv. Item Serial No.  Lot No. LRB No. Used Action   STEM HUM DBL TAPR 0 FOR ADAMS SHLDR SYS TI HUMELOCK II  STEM HUM DBL TAPR 0 FOR ADAMS SHLDR SYS TI HUMELOCK II  FX SHOULDER  Left 1 Implanted     [employer] solutions implants   Easytech stemless humeral head replacement  Size 39 titanium head ball, with the associated taper    Findings:   1. Avascular necrosis humeral head  2. Partial tearing of the supraspinatus, intact subscapularis and otherwise cuff  3. Intact glenoid surface without evidence of chondromalacia    Indications: The patient has been battling left shoulder pain for months to years. She has a history of Down syndrome and is of short stature. Her main goal was to go back to bowling.  She was found to have avascular necrosis suspected on x-ray with subchondral collapse. This was confirmed on MRI. MRI demonstrated a small focus of glenoid sided signal as well, without any chondromalacia. I discussed with her indications for hemiarthroplasty versus total.  Pain had gotten worse recently. Patient has failed all preoperative conservative treatment options. The activities of daily living have been affected quite a bit. Patient wanted to regain mobility and be active as possible, mainly work with overhead activity and avoid nocturnal symptoms. Patient understood the risk benefits and alternatives in detail and wanted to proceed with the above operation. Procedure Details:   I marked the surgical site of the left shoulder for surgery. He was taken back to the operating theater laid supine the table the bony prominences well-padded. General anesthesia was induced. We transferred the patient to the operating table, beachchair. We secured the head and cervical spine within the head vaughn, properly padded the bony prominences here. We secured this in a neutral position. The left upper extremity was prepped and draped in standard sterile fashion, with arm vaughn. Timeout was performed. Antibiotics 2 g of Ancef were given. Tranexamic acid was held due to her history of venous thrombolic disease. Primary shoulder alan-arthroplasty with biceps tenodesis:  We began the procedure with a deltopectoral incision. Sharp dissection was carried down through the subcutaneous tissue down to the level of the fascia overlying the deltoid. Subcutaneous flaps were mobilized and developed and the deltopectoral interval was identified with cephalic vein, dissected and medially mobilized. Cephalic vein had some anomalies throughout the interval.  This was ligated upon approach. The biceps tendon was identified in the bicipital groove, it was clearly degenerated and synovitic.  We transected the biceps tendon distally and tenodesis into the upper border of the pectorals major using a number 2 Ethibond suture in a figure of eight stitch fashion. Extra length of the tendon was then resected and it is entered into the rotator cuff interval.  The rotator cuff including the subscapularis was fully intact. I placed #0 Vicryl suture medially, 3 sister vessels. The subscapularis was then tagged with #5 Ethibond stitch. Using a three-quarter curved osteotome, I osteotomized a lesser tuberosity using a amira osteotomy. The residual fibers were then transected using electro Bovie cautery. The entire sleeve of tissue was then dissected down distally toward the proximal humerus. Humeral head was then able to dislocate fully anteriorly as we completely removed the subscapularis and released the capsular insertion along the anatomic neck. A sagittal saw was then used to resect the anatomic neck of the humeral head. The humeral head bone was assessed. She passed to the thumb test with good bone stock present upon osteotomy. I chose a stemless prosthesis as opposed to a stemmed replacement due to her overall small bony size. I suspected that even the smallest stem size would be too small for her anatomy, with a history of Down syndrome. Therefore, due to the indication, including alan-, as well as young age, I chose stemless humeral head replacement. 39 mm, which was the smallest head diameter available with the current implant and with most implants, even this had a few millimeters of overhang posteriorly. This was treated over the posterior aspect of the humeral head instead of treating it anterior. A single guidewire was placed. Appropriate broaching was done. A single boss reamer was performed. In the actual implant was impacted. Prior to impaction however, a series of 3 drill holes were placed laterally. A series of 2 drill holes were placed medially exiting just at the junction of the central boss.   Sutures were passed bluntly through these holes and through the bone to allow for future subscapularis repair. The sutures were then looped around the center boss of the implant itself. The implant was then impacted into position. Care was taken to not fully impacted the component in setting it within the bone such that the future taper engagement of the head was possible. Attention was then turned toward the glenoid side. A single Fukuda retractor was placed first.  I used a Batman retractor anteriorly. The glenoid surfaces appear to be pristine. The glenoid was tiny as expected in this host.  It was my belief that even the extra small glenoid would have been either just right of too big for this glenoid. In addition, there was no evidence of chondromalacia whatsoever at the glenoid side. Left knee otherwise capsular attachments intact without violating the labrum or the cartilage in any way. Attention was then turned back toward the humerus. All retractors were removed carefully. A Concentric trial head was then positioned. A trial reduction was then performed at this time. We trialed this and found excellent range of motion. Approximately 50% translation posteriorly was achieved. No evidence of impingement or instability was encountered. Good bounce back was achieved. The trial was then removed, and the appropriate head ball was then impacted onto the stemless base, malleted for the taper engagement. The rest of the implant was then impacted as a unit down into the proximal humerus, reducing the overall height another 1 to 2 mm. We then reduced the humerus on the glenoid and took the onset of range of motion without any evidence of instability or significant impingement. The subscapularis was then repaired with the transosseous sutures in a simple stitch and Santana-Bennie configuration nicely repairing back into the anterior proximal humerus just covering the lesser tuberosity and medial to the lesser tuberosity.   Restoration of the osteotomy amira was then performed to its host site. Extra length of the sutures were then cut and removed from the wound. Repair of the musculotendinous rotator cuff tear, partial-thickness chronic:  MRI confirmed the presence of a partial-thickness rotator cuff tear of the supraspinatus. Clinically however prior to surgery, she had no real weakness present at the rotator cuff. Her pain appears to be coming purely from the avascular necrosis rather than weakness from the rotator cuff. For this reason, we chose for an acute repair rather than jumping to a reverse shoulder replacement in this host.  I performed slight advancement of the supraspinatus back down to the bone with a single #5 high tensile braided suture at the greater tuberosity. I also used this to approximated the rotator interval just at its distal site without closing it in its entirety. Thorough irrigation was then performed. The total joint cocktail was then injected into the perioperative field. 1 g of vancomycin powder was then placed within the deep wound bed. Remainder of the incision was closed with a #2 Ethibond stitch running fashion for the deltopectoral interval keeping the vein superior to this level and then the wound was closed in a layered fashion with buried 2-0 Vicryl stitches, 4-0 Monocryl, and then Dermabond Prineo. The shoulder was sterilely dressed, placed in a shoulder immobilizer. Description of increased surgical complexity and time:   I used approximately 30% more time than a standard shoulder hemiarthroplasty. The components of added complexity in this case relate to History of Down syndrome, markedly small anatomy requiring alterations of implants, anatomic variances throughout the approach and an otherwise dysplastic host,  Body habitus and an altered surgical field. The extent of dissection and exposure required for this prosthesis is beyond that of a standard hemiarthroplasty shoulder replacement. This, in no way, signifies that the ultimate outcome was compromised in any way. Physician Assistant:  Benny Arredondo PA-C, was critical throughout this case. He performed patient positioning, intraoperative retraction and joint manipulation, and closure of the surgery. PLAN:  Postoperative rehabilitation: Primary Total TSA / Hemiarthroplasty protocol. She will remain in a sling for 2-3 weeks. PROM exercises for 4 weeks; No pendulums early. No strengthening for at least 4 months. Allow forward flexion 120 degrees, external rotation 20 degrees.     DVT: eliquis 2.5 mg bid  Resume regular diet, home meds  Ambulate postop, don/doff clothing  PT / OT  F/u scheduled with me in 2-3 weeks  Dispo: Plan for discharge today, therapy in PACU        Electronically signed by Luis Pace MD on 1/19/2022 at 4:08 PM

## 2022-01-19 NOTE — H&P
[address] [facility] Same Day Surgery Update H & P  Department of General Surgery   Surgical Service   Pre-operative History and Physical  Last H & P within the last 30 days. DIAGNOSIS:   Primary osteoarthritis, left shoulder [M19.012]  Incomplete rotator cuff tear or rupture of left shoulder, not specified as traumatic [M75.112]  Avascular necrosis (HCC) [M87.00]  AVN (avascular necrosis of bone) ([address] [M87.00]    Procedure(s):  LEFT TOTAL SHOULDER REPLACEMENT VERSUS HEMIARTHROPLASTY/ BICEPS TENODESIS/ REPAIR OF RUPTURED MUSCULOTENDINOUS CUFF, CHRONIC     History obtained from: Patient interview and EHR     HISTORY OF PRESENT ILLNESS:   Patient is a 41 y/o female with c/o left shoulder pain, weakness and limited ROM. MRI demonstrates findings consistent with AVN at the posterior and medial humeral head along the articular surface, incomplete rotator cuff tear, and glenohumeral chrondromalacia. The symptoms have been recalcitrant to conservative treatment and the patient presents today for the above procedure. Covid 19:  Patient denies fever, chills, worsening cough, or known exposure to Covid-19. Past Medical History:        Diagnosis Date    COVID-19 12/28/2020    Down syndrome     Hx of blood clots 2020    with Covid     Influenza A 03/10/2020     Past Surgical History:        Procedure Laterality Date    FOOT SURGERY  5/26/11    lapidus procedure and soft tissue release right foot.      Past Social History:  Social History     Socioeconomic History    Marital status: Single     Spouse name: None    Number of children: None    Years of education: None    Highest education level: None   Occupational History    None   Tobacco Use    Smoking status: Never Smoker    Smokeless tobacco: Never Used   Substance and Sexual Activity    Alcohol use: No    Drug use: No    Sexual activity: None   Other Topics Concern    None   Social History Narrative    None     Social Determinants of Health     Financial Resource Strain: Low Risk     Difficulty of Paying Living Expenses: Not hard at all   Food Insecurity: No Food Insecurity    Worried About Running Out of Food in the Last Year: Never true    Christen of Food in the Last Year: Never true   Transportation Needs:     Lack of Transportation (Medical): Not on file    Lack of Transportation (Non-Medical): Not on file   Physical Activity:     Days of Exercise per Week: Not on file    Minutes of Exercise per Session: Not on file   Stress:     Feeling of Stress : Not on file   Social Connections:     Frequency of Communication with Friends and Family: Not on file    Frequency of Social Gatherings with Friends and Family: Not on file    Attends Mu-ism Services: Not on file    Active Member of [address] [place] [address] or Organizations: Not on file    Attends Club or Organization Meetings: Not on file    Marital Status: Not on file   Intimate Partner Violence:     Fear of Current or Ex-Partner: Not on file    Emotionally Abused: Not on file    Physically Abused: Not on file    Sexually Abused: Not on file   Housing Stability:     Unable to Pay for Housing in the Last Year: Not on file    Number of [place] in the Last Year: Not on file    Unstable Housing in the Last Year: Not on file         Medications Prior to Admission:      Prior to Admission medications    Medication Sig Start Date End Date Taking? Authorizing Provider   Calcium Citrate-Vitamin D 200-200 MG-UNIT TABS Take  by mouth daily.      Yes Historical Provider, MD   mupirocin (BACTROBAN NASAL) 2 % nasal ointment Take by Nasal route 2 times daily for the 5 days prior to surgery BEGIN 01/14/2022 12/2/21 1/19/22  Dhara Pruitt MD   meloxicam (MOBIC) 15 MG tablet Take 1 tablet by mouth daily  Patient not taking: Reported on 1/10/2022 8/6/21   Piero Kaiser MD   diclofenac sodium (VOLTAREN) 1 % GEL Apply 2 g topically 2 times daily as needed for Pain  Patient not taking: Reported on 1/10/2022    Historical Provider, MD   MedroxyPROGESTERone Acetate (DEPO-PROVERA IM) Inject  into the muscle See Admin Instructions. Every three months     Historical Provider, MD         Allergies:  Patient has no known allergies. PHYSICAL EXAM:      /73   Pulse 80   Temp 98.4 °F (36.9 °C) (Temporal)   Resp 16   Ht 4' 8.5\" (1.435 m)   Wt 148 lb (67.1 kg)   SpO2 98%   BMI 32.60 kg/m²      Airway:  Airway patent with no audible stridor    Heart:  Regular rate and rhythm, No murmur noted    Lungs:  No increased work of breathing, good air exchange, clear to auscultation bilaterally, no crackles or wheezing    Abdomen:  Soft, non-distended, non-tender, no masses palpated    ASSESSMENT AND PLAN    Patient is a 42 y.o. female with above specified procedure planned. 1.  The patients history and physical was obtained and signed off by the pre-admission testing department. Patient seen and focused exam done today- no new changes since last physical exam on 1/10/2022.    2.  Access to ancillary services are available per request of the provider.     MACKENZIE Bradshaw - CNP     1/19/2022

## 2022-01-19 NOTE — PROGRESS NOTES
Physical Therapy    Facility/Department: [facility]  Initial Assessment/ Discharge Summary     NAME: Naty Ramos  : 1975  MRN: [id number]    Date of Service: 2022    Discharge Recommendations: Naty Ramos scored a 18/24 on the AM-PAC short mobility form. Current research shows that an AM-PAC score of 18 or greater is typically associated with a discharge to the patient's home setting. Based on the patient's AM-PAC score and their current functional mobility deficits, it is recommended that the patient have 2-3 sessions per week of Physical Therapy at d/c to increase the patient's independence. At this time, this patient demonstrates the endurance and safety to discharge home with OP PT and a follow up treatment frequency of 2-3x/wk. Please see assessment section for further patient specific details. If patient discharges prior to next session this note will serve as a discharge summary. Please see below for the latest assessment towards goals. PT Equipment Recommendations  Equipment Needed: No    Assessment   Body structures, Functions, Activity limitations: Decreased functional mobility ; Decreased endurance;Decreased balance;Decreased strength;Decreased safe awareness  Assessment: pt is a 41yo female s/p L total shoulder with hx of down syndrome typically indep with mobility and ADLs currently functioning without AD and min A for mobility. pt limited on this date due to dizziness and nausea post surgery but has very supportive home environment and family support who is able to physically assist with ADLs and mobility as needed. Family and patient educated on safety with mobility, proper PROM of LUE, and donning/doffing immobilizer with understanding.  recommend pt return home with family and seek OP PT at [place]. pt to be discharged from acute PT with no further needs  Treatment Diagnosis: dec functional mobility s/p L total shoulder  Prognosis: Good  Decision Making: Low Complexity  PT Education: Goals;PT Role;Plan of Care;Home Exercise Program;General Safety;Precautions; Functional Mobility Training  Patient Education: pt verbalized understanding  REQUIRES PT FOLLOW UP: No  Activity Tolerance  Activity Tolerance: Treatment limited secondary to medical complications (free text)  Activity Tolerance: pt limited by nausea and dizziness upon change of position post surgery. pt able to take a few steps in place with min A and sat EOB for ~20 min. family able to physically assist at dc       Patient Diagnosis(es): The primary encounter diagnosis was Rotator cuff impingement syndrome of left shoulder. A diagnosis of AVN (avascular necrosis of bone) ([facility]) was also pertinent to this visit. has a past medical history of COVID-19, Down syndrome, Hx of blood clots, and Influenza A.   has a past surgical history that includes Foot surgery (5/26/11). Restrictions  Position Activity Restriction  Other position/activity restrictions: shoulder immobilizer (Simultaneous filing. User may not have seen previous data.)  Vision/Hearing  Vision: Within Functional Limits  Hearing: Within functional limits     Subjective  General  Chart Reviewed: Yes  Additional Pertinent Hx: pt is a 41 yo female s/p LEFT TOTAL SHOULDER REPLACEMENT VERSUS HEMIARTHROPLASTY/ BICEPS TENODESIS/ REPAIR OF RUPTURED MUSCULOTENDINOUS CUFF, CHRONIC  Family / Caregiver Present: Yes (sister)  Referring Practitioner: Catracho Bernstein MD  Diagnosis: L total shoulder  Follows Commands: Within Functional Limits  Subjective  Subjective: pt supine in bed and agreeable to PT.  \"I feel okay\"  Pain Screening  Patient Currently in Pain: Denies  Vital Signs  Patient Currently in Pain: Denies       Orientation  Orientation  Overall Orientation Status: Within Functional Limits  Social/Functional History  Social/Functional History  Lives With:  (mother and father)  Type of Home: House  Home Layout: Bed/Bath upstairs,Performs ADL's on one level (planning on staying on first level of home)  Home Access:  (1 YVONNE w/o rail)  Bathroom Shower/Tub: Walk-in shower  Bathroom Toilet: Standard (leverage next toilet)  Bathroom Equipment: Hand-held shower,Shower chair  Bathroom Accessibility: Accessible  Home Equipment:  (planning on sleeping on recliner at discharged)  [address] [facility] From: Family  ADL Assistance: Independent  Homemaking Assistance: Needs assistance  Homemaking Responsibilities: No  Ambulation Assistance: Independent  Transfer Assistance: Independent  Active : No  Occupation: Part time employment  Additional Comments: pt has very supportive family that will assist with all ADLs and mobility as needed. will be available 24/7  Cognition        Objective                      Bed mobility  Supine to Sit: Stand by assistance  Sit to Supine: Stand by assistance  Comment: HOB elevated, inc time to complete  Transfers  Sit to Stand: Contact guard assistance  Stand to sit: Contact guard assistance  Comment: with HHA from EOB x3  Ambulation  Ambulation?: Yes  Ambulation 1  Surface: level tile  Device: Hand-Held Assist  Assistance: Minimal assistance  Quality of Gait: pt requiring HHA and min A due to dizziness, pt typically indep with ambulation and family willing to physically assist at [facility]: few marches in place + side steps to [facility]  Comments: pt unable to tolerate further ambulation due to dizziness/nausea. family aware and willing to physically assist pt  Stairs/Curb  Stairs?: No     Balance  Posture: Good  Sitting - Static: Good  Sitting - Dynamic: Good  Standing - Static: Fair;-  Standing - Dynamic: Fair;-  Comments: sitting balance EOB sup, standing balance with HHA min A with donning/doffing pants due to dizziness/ nausea.         Plan   Plan  Times per week: initial dc  Safety Devices  Type of devices: Gait belt,Call light within reach,Nurse notified,Left in bed    G-Code       OutComes Score AM-PAC Score  AM-PAC Inpatient Mobility Raw Score : 18 (01/19/22 1657)  AM-PAC Inpatient T-Scale Score : 43.63 (01/19/22 1657)  Mobility Inpatient CMS 0-100% Score: 46.58 (01/19/22 1657)  Mobility Inpatient CMS G-Code Modifier : CK (01/19/22 1657)          Goals  Short term goals  Time Frame for Short term goals: no acute PT goals to be addressed       Therapy Time   Individual Concurrent Group Co-treatment   Time In 1600         Time Out 1645         Minutes 45              Timed Code Treatment Minutes:  30 min     Total Treatment Minutes:  45 min       Allison Bernabe, PT

## 2022-01-19 NOTE — ANESTHESIA PROCEDURE NOTES
Peripheral Block    Patient location during procedure: pre-op  Start time: 1/19/2022 11:16 AM  End time: 1/19/2022 11:25 AM  Staffing  Performed: anesthesiologist   Anesthesiologist: Izabella Pearson MD  Preanesthetic Checklist  Completed: patient identified, IV checked, site marked, risks and benefits discussed, surgical consent, monitors and equipment checked, pre-op evaluation, timeout performed, anesthesia consent given, oxygen available and patient being monitored  Peripheral Block  Patient position: sitting  Prep: ChloraPrep  Patient monitoring: cardiac monitor, continuous pulse ox, frequent blood pressure checks and IV access  Block type: Brachial plexus  Laterality: left  Injection technique: single-shot  Guidance: ultrasound guided  Interscalene  Provider prep: mask and sterile gloves  Needle  Needle type: short-bevel   Needle gauge: 22 G  Needle length: 8 cm  Needle localization: ultrasound guidance  Assessment  Injection assessment: negative aspiration for heme, no paresthesia on injection and local visualized surrounding nerve on ultrasound  Paresthesia pain: none  Slow fractionated injection: yes  Hemodynamics: stable  Additional Notes  Immediately prior to procedure a \"time out\" was called to verify the correct patient, allergies, laterality, procedure and equipment. Time out performed with RN. Local Anesthetic: See below    Anterior scalene and middle scalene muscles, upper, middle and lower trunks of the brachial plexus are identified, the tip of the needle and the spread of the local anesthetic around the brachial plexus are visualized. The Brachial plexus appeared to be anatomically normal and there were no abnormal pathologically findings seen. Left Ultrasound-Guided Interscalene Brachial Plexus Block    Indication: Postoperative analgesia upon request of the attending surgeon.     Immediately prior to procedure a \"time out\" was called to verify the correct patient, allergies, laterality, procedure and equipment. Time out performed with RN. Local Anesthetic: See below    Anterior scalene and middle scalene muscles, upper, middle and lower trunks of the brachial plexus are identified, the tip of the needle and the spread of the local anesthetic around the brachial plexus are visualized. The Brachial plexus appeared to be anatomically normal and there were no abnormal pathologically findings seen. Procedure: Informed consent obtained and pre-procedural timeout procedure performed. Patient supine in semi-upright position. Landmarks identified. Sterile prep. Left brachial plexus was identified using an ultrasound probe and an 80mm 22G insulated regional block needle was inserted posterior to the left interscalene groove at the level of the C6 tubercle and directed in an anterior manner toward the brachial plexus. The needle was visualized on ultrasound as it entered the plexus sheath. Bupivacaine 0.5%-10cc and Exparel-20cc were mixed together and injected in 5cc increments to a total volume of 30cc after aspiration was performed prior to each increment and found to be negative for both heme and CSF. Block was tolerated well by the patient. There were no apparent complications at the time of the block. Medications Administered  Bupivacaine (MARCAINE) PF injection 0.5%, 10 mL  bupivacaine liposome (EXPAREL) injection 1.3%, 20 mL  Reason for block: post-op pain management and at surgeon's request            Oneil Mckeon.  Pascale Orozco MD  January 19, 2022 11:32 AM

## 2022-01-19 NOTE — PLAN OF CARE
(Patient/Family) educated on shoulder immobilizer; including purpose, donning/doffing, fit/positioning, and wear schedule as ordered by surgical team. (Patient/Family) verbalized and performed return demonstration, confirming understanding.

## 2022-01-19 NOTE — FLOWSHEET NOTE
PACU Transfer to [facility]    Vitals:    01/19/22 1645   BP: 108/76   Pulse: 76   Resp: 14   Temp: 97 °F (36.1 °C)   SpO2: 96%         Intake/Output Summary (Last 24 hours) at 1/19/2022 1701  Last data filed at 1/19/2022 1656  Gross per 24 hour   Intake 550 ml   Output --   Net 550 ml       Pain assessment:  none  Pain Level: 0    Patient transferred to care of [facility] RN.    1/19/2022 5:01 PM

## 2022-01-19 NOTE — PROGRESS NOTES
Procedure: peripheral block  MD: Dr. Ronn Rivrea performed. Pt monitored closely on heart monitor, 2L NC, up to 4L during procedure, now back on 2L NC, continuous pulse oximetry and frequent BPs. Pt remained slightly drowsy and awakens easily to voice, oriented x4. pt tolerated procedure well.

## 2022-01-19 NOTE — ANESTHESIA PRE PROCEDURE
MD BIBI        morphine injection 2 mg  2 mg IntraVENous Q5 Min PRN Raf Hall MD        HYDROmorphone (DILAUDID) injection 0.5 mg  0.5 mg IntraVENous Q5 Min PRN Raf Hall MD        oxyCODONE (ROXICODONE) immediate release tablet 5 mg  5 mg Oral PRN Raf Hall MD        Or    oxyCODONE (ROXICODONE) immediate release tablet 10 mg  10 mg Oral PRN Raf Hall, MD        diphenhydrAMINE (BENADRYL) injection 12.5 mg  12.5 mg IntraVENous Once PRN Raf Hall MD        metoclopramide (REGLAN) injection 10 mg  10 mg IntraVENous Once PRN Raf Hall MD        promethazine [facility]) injection 6.25 mg  6.25 mg IntraVENous Once PRN Raf Hall MD        labetalol (NORMODYNE;TRANDATE) injection 5 mg  5 mg IntraVENous Q10 Min PRN Raf Hall MD        hydrALAZINE (APRESOLINE) injection 5 mg  5 mg IntraVENous Q10 Min PRN Raf Hall MD        meperidine (DEMEROL) injection 12.5 mg  12.5 mg IntraVENous Q5 Min PRN Raf Hall MD        midazolam (VERSED) injection 2 mg  2 mg IntraVENous Once Raf Hall MD        fentaNYL (SUBLIMAZE) injection 100 mcg  100 mcg IntraVENous Once Raf Hall MD        bupivacaine (PF) (MARCAINE) 0.5 % injection 150 mg  30 mL Infiltration Once Raf Hall MD           Allergies:  No Known Allergies    Problem List:    Patient Active Problem List   Diagnosis Code    Bunion of right foot M21.611    Closed displaced fracture of fourth metatarsal bone of right foot S92.341A    Pneumonia due to COVID-19 virus U07.1, J12.82    Pulmonary infiltrates R91.8    Hypoxemia R09.02    Hyponatremia E87.1    Down's syndrome Q90.9    Vitamin D deficiency E55.9    Pulmonary embolism (HCC) I26.99    Hyperglycemia R73.9    Rotator cuff impingement syndrome of left shoulder M75.42    AVN (avascular necrosis of bone) ([facility] [address] M87.00       Past Medical History:        Diagnosis Date    COVID-19 12/28/2020    Down syndrome     Hx of blood clots 2020    with Covid     Influenza A 03/10/2020       Past Surgical History:        Procedure Laterality Date    FOOT SURGERY  5/26/11    lapidus procedure and soft tissue release right foot. Social History:    Social History     Tobacco Use    Smoking status: Never Smoker    Smokeless tobacco: Never Used   Substance Use Topics    Alcohol use: No                                Counseling given: Not Answered      Vital Signs (Current):   Vitals:    01/13/22 0814 01/19/22 0944   BP:  114/73   Pulse:  80   Resp:  16   Temp:  98.4 °F (36.9 °C)   TempSrc:  Temporal   SpO2:  98%   Weight: 148 lb (67.1 kg) 148 lb (67.1 kg)   Height: 4' 8.5\" (1.435 m) 4' 8.5\" (1.435 m)                                              BP Readings from Last 3 Encounters:   01/19/22 114/73   01/10/22 102/80   08/06/21 110/68       NPO Status: Time of last liquid consumption: 2100                        Time of last solid consumption: 1815                        Date of last liquid consumption: 01/18/22                        Date of last solid food consumption: 01/18/22    BMI:   Wt Readings from Last 3 Encounters:   01/19/22 148 lb (67.1 kg)   01/10/22 150 lb (68 kg)   12/02/21 148 lb (67.1 kg)     Body mass index is 32.6 kg/m².     CBC:   Lab Results   Component Value Date    WBC 3.5 01/07/2022    RBC 4.50 01/07/2022    HGB 14.8 01/07/2022    HCT 44.0 01/07/2022    MCV 97.7 01/07/2022    RDW 13.4 01/07/2022     01/07/2022       CMP:   Lab Results   Component Value Date     01/07/2022    K 4.3 01/07/2022    K 4.6 01/08/2021     01/07/2022    CO2 23 01/07/2022    BUN 17 01/07/2022    CREATININE 0.9 01/07/2022    GFRAA >60 01/07/2022    AGRATIO 1.2 01/08/2021    LABGLOM >60 01/07/2022    GLUCOSE 79 01/07/2022    PROT 7.0 01/08/2021    CALCIUM 9.7 01/07/2022    BILITOT 0.3 01/08/2021    ALKPHOS 91 01/08/2021    AST 26 01/08/2021    ALT 86 01/08/2021       POC Tests: No results for input(s): POCGLU, POCNA, POCK, POCCL, POCBUN, POCHEMO, POCHCT in the last 72 hours. Coags:   Lab Results   Component Value Date    PROTIME 11.7 01/07/2022    INR 1.03 01/07/2022    APTT 32.2 01/07/2022       HCG (If Applicable): No results found for: PREGTESTUR, PREGSERUM, HCG, HCGQUANT     ABGs: No results found for: PHART, PO2ART, PFT8UKX, UUT6HDZ, BEART, D9EYWDDV     Type & Screen (If Applicable):  No results found for: LABABO, LABRH    Drug/Infectious Status (If Applicable):  No results found for: HIV, HEPCAB    COVID-19 Screening (If Applicable): No results found for: COVID19        Anesthesia Evaluation  Patient summary reviewed and Nursing notes reviewed no history of anesthetic complications:   Airway: Mallampati: III  TM distance: <3 FB   Neck ROM: limited  Mouth opening: < 3 FB Dental:          Pulmonary:                              Cardiovascular:                      Neuro/Psych:               GI/Hepatic/Renal:             Endo/Other:                      ROS comment: Trisomy 21 Abdominal:             Vascular:   + PE (Hx of PE following Covid 19 infection). Other Findings:             Anesthesia Plan      general     ASA 1    (65-year-old female presents for LEFT TOTAL SHOULDER REPLACEMENT VERSUS HEMIARTHROPLASTY/ BICEPS TENODESIS/ REPAIR OF RUPTURED MUSCULOTENDINOUS CUFF, CHRONIC. Plan general anesthesia with ASA standard monitors; interscalene brachial plexus block for postoperative analgesia upon request of the attending surgeon. Questions answered. Patient agreeable with anesthetic plan.   )  Induction: intravenous. Anesthetic plan and risks discussed with patient. Plan discussed with CRNA.     Attending anesthesiologist reviewed and agrees with Ike Montes MD   1/19/2022

## 2022-01-19 NOTE — PROGRESS NOTES
Ambulatory Surgery/Procedure Discharge Note    Vitals:    01/19/22 1701   BP: 119/73   Pulse: 76   Resp: 13   Temp: 97 °F (36.1 °C)   SpO2: 98%     Pt met criteria for discharge per Wong score. In: 550 [P.O.:200; I.V.:250]  Out: 1     Restroom use offered before discharge. Yes    Pain assessment:  none  Pain Level: 0      Pt and S.O./family states \"ready to go home\". Pt alert and oriented x4. IV removed. Denies nausea or pain. Up to BR before d/c, and pt has scant amount of emesis. Pt and sister still ready for d/c and sister gave pt a zofran from the script she had filled post op. No incidence getting to car. Dressing L shoulder CDI. Immobilizer on. Voided prior to discharge. Discharge instructions given to pt and sister with pt permission. Pt and sister verbalized understanding of all instructions. Left with all belongings, 4 filled prescriptions and one to  at [facility] per sister, and discharge instructions. Patient discharged to home/self care.  Patient discharged via wheel chair by transporter to waiting family/S.O.       1/19/2022 6:22 PM

## 2022-01-19 NOTE — H&P
Update History & Physical     The patient's History and Physical of 1/10/2022 was reviewed with the patient and I examined the patient. There was no change. The surgical site was confirmed by the patient and me. Plan: The risks, benefits, expected outcome, and alternative to the recommended procedure have been discussed with the patient / family. Patient understands and wants to proceed with the procedure.       Electronically signed by Ajit Lino MD on 1/19/2022 at 11:04 AM

## 2022-01-20 ENCOUNTER — TELEPHONE (OUTPATIENT)
Dept: ORTHOPEDIC SURGERY | Age: 47
End: 2022-01-20

## 2022-01-20 NOTE — TELEPHONE ENCOUNTER
Spoke with Ron CorbinDelmi Vo's sister and guardian    Incision status: No drainage or redness    Edema/Swelling/Teds: None    Pain level and status: Patient took her first pain pill at lunch today after saying her arm was sore. Use of pain medications: Oxycodone 5 mg     Blood thinner: Eliquis    Bowels: Discussed the importance of watching for signs and symptoms of constipation. Ron Alaniz states that stool softeners don't work for the patient so they gave her a small dose of Ex Lax as they have done in the past with procedures. Home Care Agency active: None    Outpatient therapy: Hasn't started yet    Do you have all of your medications: yes    Changes in medications: No    Ortho Vitals: N/A    Instructed pt to call Nurse Navigator or surgeon's office with any questions or concerns.      Follow up appointments:    Future Appointments   Date Time Provider Berna Erwin   2/8/2022 11:15 AM Amita Jamison MD AND [facility]ABEL

## 2022-01-21 NOTE — DISCHARGE SUMMARY
Department of Orthopedic Surgery  Physician Assistant   Discharge Summary    The Gonzalez Estrada is a 42 y.o. female admitted for left shoulder AVN. Gonzalez Estrada was admitted to the floor following Her recovery in the PACU. Discharge Diagnosis  left shoulder hemiarthroplasty    Current Inpatient Medications    No current facility-administered medications for this encounter. Post-operatively the patients diet was advanced as tolerated and their dressing was changed on POD #1. The incision is dressing in place, clean, dry and intact with no signs of infection. The patient remained neurovascularly intact in the left upper and had intact pulses distally. Patients calf remained soft and showed no evidence of DVT. The patient was able to move their left upper extremity without any problems post-operatively. Physical therapy and occupational therapy were consulted and began working with the patient post-operatively. The patient progressed with PT/OT as would be expected and continued to improve through their stay. The patients pain was initially controlled with IV medications but we were able to transition to oral pain medications soon after arrival to the floor and their pain remained under good control through their hospital stay. From a medical standpoint the patient remained stable and continued to have the medicine team follow throughout their stay. The patient will be discharged at this time to Home  with their current diet restrictions and will continue to follow the precautions outlined to them by us and PT/OT. Condition on Discharge: Stable    Plan  Return visit in 3 weeks. .  Patient was instructed on the use of pain medications, the signs and symptoms of infection, and was given our number to call should they have any questions or concerns following discharge. For opioid prescriptions given at discharge the following statement is provided for compliance with [employer] rules.  Patient being given increased dosage/quantity of opoid pain medication in excess of [employer] guidelines which noted a 30 MED daily of opioids due to the fact that he/she has undergone major orthopaedic surgery as outlined in rule 4731-11-13. Dosages and further duration of the pain medication will be re-evaluated at her post op visit in 2 weeks. Patient was educated on dosing expectations and limits of prescribing as a result of the new [facility] Board rules enacted August 31, 2017. Please also note that this is not the initial opoid prescription issued to this patient but a continuation of medication utilized during the hospital admission as noted in the medical record. [place] report has also been utilized to screen for any abuse history or suspicious activity as outlined in [place]. All efforts have been taken to prevent abuse potential and misuse of opioid medications including education, screening, and close clinical follow up.

## 2022-02-08 ENCOUNTER — OFFICE VISIT (OUTPATIENT)
Dept: ORTHOPEDIC SURGERY | Age: 47
End: 2022-02-08

## 2022-02-08 VITALS — HEIGHT: 57 IN | WEIGHT: 148 LBS | BODY MASS INDEX: 31.93 KG/M2

## 2022-02-08 DIAGNOSIS — Z96.612 STATUS POST LEFT SHOULDER HEMIARTHROPLASTY: ICD-10-CM

## 2022-02-08 DIAGNOSIS — M25.512 LEFT SHOULDER PAIN, UNSPECIFIED CHRONICITY: Primary | ICD-10-CM

## 2022-02-08 PROCEDURE — 99024 POSTOP FOLLOW-UP VISIT: CPT | Performed by: ORTHOPAEDIC SURGERY

## 2022-02-08 NOTE — PROGRESS NOTES
Dr Karen Gregg      Date /Time 2/8/2022       11:37 AM EST  Name Daniella Duncan             1975   Location  [facility]  MRN <[id number]>                Chief Complaint   Patient presents with    Post-Op Check     LEFT SHOULDER TYRONE 1/19/22        History of Present Illness      Daniella Duncan is a 42 y.o. female is here for post-op visit after LEFT      Shoulder hemiarthroplasty and rotator cuff repair. Patient is almost 3 weeks postop. Patient doing well. Pain controlled. Patient denies any fever chills or drainage. Patient has been compliant with use of her sling. Physical Exam    Based off 1997 Exam Criteria    Ht 4' 8.5\" (1.435 m)   Wt 148 lb (67.1 kg)   BMI 32.60 kg/m²      Constitutional:       General: He is not in acute distress. Appearance: Normal appearance. LEFT Shoulder: incision clean, intact, healing appropriately. No surrounding  erythema or fluctuance. Neuro intact distal. No evidence of DVT. Imaging       Left Shoulder: [address] [facility]  Radiographs: X-rays were ordered and reviewed of the left shoulder. 3 views. AP, scapular Y, and axillary views. They demonstrate no evidence of fractures or dislocations. No evidence of loosening or periprosthetic fracture. Assessment and Plan    Kasey Pereira was seen today for post-op check. Diagnoses and all orders for this visit:    Left shoulder pain, unspecified chronicity  -     XR SHOULDER LEFT (MIN 2 VIEWS); Future      Postoperative rehabilitation: Primary Total TSA / Hemiarthroplasty protocol. She will remain in a sling for 2-3 weeks. PROM exercises for 4 weeks; No pendulums early. No strengthening for at least 4 months. Allow forward flexion 120 degrees, external rotation 20 degrees    Patient is doing well. Patient will discontinue her pillow today. Discontinue sling over the next few days.   She will start physical therapy along the postoperative protocol which was given her today with modifications above. We will see her back in 4 weeks or sooner problems arise. Patient will continue to work on active hand wrist and elbow range of motion    Electronically signed by Mio Robbins PA-C on 2/8/2022 at 11:37 AM  This dictation was generated by voice recognition computer software. Although all attempts are made to edit the dictation for accuracy, there may be errors in the transcription that are not intended.

## 2022-02-14 ENCOUNTER — HOSPITAL ENCOUNTER (OUTPATIENT)
Dept: PHYSICAL THERAPY | Age: 47
Setting detail: THERAPIES SERIES
Discharge: HOME | End: 2022-02-14
Payer: MEDICARE

## 2022-02-14 PROCEDURE — 97110 THERAPEUTIC EXERCISES: CPT

## 2022-02-14 PROCEDURE — 97161 PT EVAL LOW COMPLEX 20 MIN: CPT

## 2022-02-14 PROCEDURE — 97140 MANUAL THERAPY 1/> REGIONS: CPT

## 2022-02-14 NOTE — FLOWSHEET NOTE
[facility] [facility], [address] [place] [address], [address] [place] [address] [address]  Phone: [phone]   Fax:     [phone]      Physical Therapy Treatment Note/ Progress Report:           Date:  2022    Patient Name:  Annabella Damon    :  1975  MRN: [id number]  Restrictions/Precautions:    Medical/Treatment Diagnosis Information:  · Diagnosis: M25.512 (ICD-10-CM) - Left shoulder pain, unspecified chronicity; Z96.612 (ICD-10-CM) - Status post left shoulder hemiarthroplasty  · Treatment Diagnosis: Left shoulder pain, decreased ROm, strength  Insurance/Certification information:  PT Insurance Information: Medicare/medicaid  Physician Information:  Referring Practitioner: Carla Balbuena MD  Has the plan of care been signed (Y/N):        []  Yes  [x]  No     Date of Patient follow up with Physician:     Assessment Summary: Aram Estrella is a 42 y.o. female reporting to OP PT s/p left TSA on 22. Pt is noted to be doing rather well. ROM and strength testing deferred. Will progress per protocol.        Is this a Progress Report:     []  Yes  [x]  No        If Yes:  Date Range for reporting period:  Beginnin22  Ending:   3/14/22    Progress report will be due (10 Rx or 30 days whichever is less):        Recertification will be due (POC Duration  / 90 days whichever is less): 22          Visit # Insurance Allowable Auth Required   In [facility] [address] []  Yes     []  No    Tele Health   []  Yes     []  No    Total 1         Functional Scale: Quick Dash 84%   Date assessed:     Latex Allergy:  [x]NO      []YES  Preferred Language for Healthcare:   [x]English       []other:      Pain level:  2/10         SUBJECTIVE:  See eval        OBJECTIVE: See eval          RESTRICTIONS/PRECAUTIONS: No passive ER past 20°/flexion past 120°, No active internal rotation, No pendulums    Exercises/Interventions: HEP code: [id number]  Therapeutic Ex (66879) HEP  Warm-up       Pulleys       UBE              TABLE                                                               SEATED       Table slides x x20     Scap squeeze x x20     Gripping x x20     PROM cane ER to neutral x x20     Elbow flexion x x20            STANDING                                                          Manual: PROM shoulder flexion to 90°, ER to 10°, elbow flex/ext    Therapeutic Exercise and NMR EXR  [x] (46249) Provided verbal/tactile cueing for activities related to strengthening, flexibility, endurance, ROM  for improvements in scapular, scapulothoracic and UE control with self care, reaching, carrying, lifting, house/yardwork, driving/computer work.    [] (37274) Provided verbal/tactile cueing for activities related to improving balance, coordination, kinesthetic sense, posture, motor skill, proprioception  to assist with  scapular, scapulothoracic and UE control with self care, reaching, carrying, lifting, house/yardwork, driving/computer work. Therapeutic Activities:    [x] (67377 or 44474) Provided verbal/tactile cueing for activities related to improving balance, coordination, kinesthetic sense, posture, motor skill, proprioception and motor activation to allow for proper function of scapular, scapulothoracic and UE control with self care, carrying, lifting, driving/computer work.      Home Exercise Program:    [x] (81146) Reviewed/Progressed HEP activities related to strengthening, flexibility, endurance, ROM of scapular, scapulothoracic and UE control with self care, reaching, carrying, lifting, house/yardwork, driving/computer work  [] (88148) Reviewed/Progressed HEP activities related to improving balance, coordination, kinesthetic sense, posture, motor skill, proprioception of scapular, scapulothoracic and UE control with self care, reaching, carrying, lifting, house/yardwork, driving/computer work      Manual Treatments:  PROM / STM / Oscillations-Mobs:  G-I, II, III, IV (Trisha, Inf., Post.)  [x] (62568) Provided manual therapy to mobilize soft tissue/joints of cervical/CT, scapular GHJ and UE for the purpose of modulating pain, promoting relaxation,  increasing ROM, reducing/eliminating soft tissue swelling/inflammation/restriction, improving soft tissue extensibility and allowing for proper ROM for normal function with self care, reaching, carrying, lifting, house/yardwork, driving/computer work    Modalities:     [] GAME READY (VASO)- for significant edema, swelling, pain control. Charges:  Timed Code Treatment Minutes: 30   Total Treatment Minutes:  45   BWC:  TE TIME:  NMR TIME:  MANUAL TIME:  TA  UNTIMED MINUTES:  Medicare Total:   20    10    15        [x] EVAL (LOW) 05165 (typically 20 minutes face-to-face)  [] EVAL (MOD) 41660 (typically 30 minutes face-to-face)  [] EVAL (HIGH) 40326 (typically 45 minutes face-to-face)  [] RE-EVAL     [x] FT(39385) 1     [] IONTO  [] NMR (79492) x     [] VASO  [x] Manual (38528) 1     [] Other:  [] TA x      [] Mech Traction (75479)  [] ES(attended) (80800)      [] ES (un) (30935):           GOALS:     Patient stated goal: Improve motion, bowl     Therapist goals for Patient:   Short Term Goals: To be achieved in: 2 weeks  1. Independent in HEP and progression per patient tolerance, in order to prevent re-injury. []? Progressing: []? Met: []? Not Met: []? Adjusted      2. Patient will have a decrease in pain to facilitate improvement in movement, function, and ADLs as indicated by Functional Deficits. []? Progressing: []? Met: []? Not Met: []? Adjusted      Long Term Goals: To be achieved in: 16 weeks  1. Disability index score of 40 % or less for the DASH to assist with reaching prior level of function. []? Progressing: []? Met: []? Not Met: []? Adjusted      2. Patient will demonstrate increased AROM flex/abd/ER/IR to 140/140/50/L1 to allow for proper joint functioning as indicated by patients Functional Deficits. []?  Progressing: []? Met: []? Not Met: []? Adjusted      3. Patient will demonstrate an increase in Strength of shoulder to 5/5 to allow for proper functional mobility as indicated by patients Functional Deficits. []? Progressing: []? Met: []? Not Met: []? Adjusted      4. Patient will return to functional activities without increased symptoms or restriction. []? Progressing: []? Met: []? Not Met: []? Adjusted      5. Pt will return to bowling. (patient specific functional goal)    []? Progressing: []? Met: []? Not Met: []? Adjusted              ASSESSMENT:  See eval    Patient received education on their current pathology and how their condition effects them with their functional activities. Patient understood discussion and questions were answered. Patient understands their activity limitations and understands rational for treatment progression. Pt educated on plan of care and HEP, if worsening symptoms to d/c that exercise. PLAN: See eval  [x] Continue per plan of care [] Alter current plan (see comments above)  [] Plan of care initiated [] Hold pending MD visit [] Discharge      Electronically signed by:  Zoila Perez PT    Note: If patient does not return for scheduled/ recommended follow up visits, this note will serve as a discharge from care along with most recent update on progress.

## 2022-02-14 NOTE — PROGRESS NOTES
[address] [place] [address] [facility], [address], [address] [place] [address] [address]  Phone: [phone]   Fax: [phone]    Date: 2022          Patient Name; :  Symone Stewart; 1975   Dx: Diagnosis: A31.993 (ICD-10-CM) - Left shoulder pain, unspecified chronicity; T15.345 (ICD-10-CM) - Status post left shoulder hemiarthroplasty      Physician: Referring Practitioner: Domi August MD        Total PT Visits:      Measures Previous Current   Pain (0-10)     Disability %           Assessment:        Prognosis for POC: [] Good [] Fair  [] Poor      Patient requires continued skilled intervention: [] Yes  [] No        Plan & Recommendations:  [] Continue rehabilitation due to objective improvement and continued functional deficits with frequency and duration:   [] Progress toward  []GAP, []Work Conditioning, []Independent HEP   [] Discharge due to   [] All goals achieved, [] Maximized \"medical necessity\" [] No subjective or objective improvements      Electronically signed by:  Yola Whyte, PT  Therapy Plan of Care Re-Certification  This patient has been re-evaluated for physical therapy services and for therapy to continue, Medicare, Medicaid and other insurances require periodic physician review of the treatment plan. Please review the above re-evaluation and verify that you agree with plan of care as established above by signing the attached document and return it to our office or note changes to established plan below  [] Follow treatment plan as above [] Discontinue physical therapy  [] Change plan to:                                 __________________________________________________    Physician Signature:____________________________________ Date:____________  By signing above, therapists plan is approved by physician    If you have any questions or concerns, please don't hesitate to call.   Thank you for your referral.

## 2022-02-14 NOTE — PLAN OF CARE
[facility], [address] [facility], [address]  Phone: [phone]   Fax:     [phone]                                                       Physical Therapy Certification    Dear Referring Practitioner: Ana Lilia Springer MD,    We had the pleasure of evaluating the following patient for physical therapy services at [address] [place] [address]. A summary of our findings can be found in the initial assessment below. This includes our plan of care. If you have any questions or concerns regarding these findings, please do not hesitate to contact me at the office phone number checked above. Thank you for the referral.       Physician Signature:_______________________________Date:__________________  By signing above (or electronic signature), therapists plan is approved by physician      Patient: Brent Govea   : 1975   MRN: [id number]  Referring Physician: Referring Practitioner: Ana Lilia Springer MD      Evaluation Date: 2022      Medical Diagnosis Information:  Diagnosis: M25.512 (ICD-10-CM) - Left shoulder pain, unspecified chronicity; X04.368 (ICD-10-CM) - Status post left shoulder hemiarthroplasty   Treatment Diagnosis: Left shoulder pain, decreased ROm and strength                                        Insurance information: PT Insurance Information: Medicare/medicaid    Precautions/ Contra-indications: She will remain in a sling for 2-3 weeks. PROM exercises for 4 weeks; No pendulums early. No strengthening for at least 4 months. Allow forward flexion 120 degrees, external rotation 20 degrees    Latex Allergy:  [x]NO      []YES    Preferred Language for Healthcare:   [x]English       []other:    SUBJECTIVE: Patient stated complaint: Pt states shoulder started hurting after COVID.  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 C-SSRS Triggered by Intake questionnaire (Past 2 wk assessment):   [x] No, Questionnaire did not trigger screening.   [] Yes, Patient intake triggered further evaluation      [] C-SSRS Screening completed  [] PCP notified via Plan of Care  [] Emergency services notified     OBJECTIVE:     CERVICAL ROM RIGHT LEFT   Cervical Flexion     Cervical Extension     Cervical Lateral Flexion     Cervical Rotation          ROM RIGHT LEFT   Shoulder Flexion     Shoulder Abduction     Shoulder External Rotation     Shoulder Internal Rotation                    Strength  RIGHT LEFT   Shoulder Flexion     Shoulder Abduction     Shoulder External Rotation     Shoulder Internal Rotation          Elbow flexion     Wrist Extension     Elbow extension     5th digi abduction            Reflexes/Sensation:    [x]Dermatomes/Myotomes intact    []Reflexes equal and normal bilaterally    []Other:    Joint mobility:    []Normal    [x]Hypo   []Hyper    Palpation: no ttp    Functional Mobility/Transfers: Modified independent    Posture: WFL    Bandages/Dressings/Incisions: Donning sling    Gait: (include devices/WB status): WNL    Orthopedic Special Tests: None                       [x] Patient history, allergies, meds reviewed. Medical chart reviewed. See intake form. Review Of Systems (ROS):  [x]Performed Review of systems (Integumentary, CardioPulmonary, Neurological) by intake and observation. Intake form has been scanned into medical record. Patient has been instructed to contact their primary care physician regarding ROS issues if not already being addressed at this time.       Co-morbidities/Complexities (which will affect course of rehabilitation):   []None           Arthritic conditions   []Rheumatoid arthritis (M05.9)  []Osteoarthritis (M19.91)   Cardiovascular conditions   []Hypertension (I10)  []Hyperlipidemia (E78.5)  []Angina pectoris (I20)  []Atherosclerosis (I70)   Musculoskeletal conditions   []Disc pathology   []Congenital spine pathologies   []Prior surgical intervention  []Osteoporosis (M81.8)  []Osteopenia (M85.8)   Endocrine conditions   []Hypothyroid (E03.9)  []Hyperthyroid Gastrointestinal conditions   []Constipation (F66.86)   Metabolic conditions   []Morbid obesity (E66.01)  []Diabetes type 1(E10.65) or 2 (E11.65)   []Neuropathy (G60.9)     Pulmonary conditions   []Asthma (J45)  []Coughing   []COPD (J44.9)   Psychological Disorders  []Anxiety (F41.9)  []Depression (F32.9)   []Other:   []Other: down syndrome       Barriers to/and or personal factors that will affect rehab potential:              []Age  []Sex              []Motivation/Lack of Motivation                        [x]Co-Morbidities              []Cognitive Function, education/learning barriers              []Environmental, home barriers              []profession/work barriers  []past PT/medical experience  []other:  Justification:      Falls Risk Assessment (30 days):   [x] Falls Risk assessed and no intervention required. [] Falls Risk assessed and Patient requires intervention due to being higher risk   TUG score (>12s at risk):     [] Falls education provided, including         Functional Questionnaire: Quick Dash 84%    ASSESSMENT: Shea Morelos is a 42 y.o. female reporting to OP PT s/p left TSA on 1/19/22. Pt is noted to be doing rather well. ROM and strength testing deferred. Will progress per protocol.        Functional Impairments   [x]Noted spinal or UE joint hypomobility   []Noted spinal or UE joint hypermobility   [x]Decreased UE functional ROM   [x]Decreased UE functional strength   []Abnormal reflexes/sensation/myotomal/dermatomal deficits   [x]Decreased RC/scapular/core strength and neuromuscular control   []other:      Functional Activity Limitations (from functional questionnaire and intake)   []Reduced ability to tolerate prolonged functional positions   [x]Reduced ability or difficulty with changes of positions or transfers between positions   []Reduced ability to maintain good posture and demonstrate good body mechanics with sitting, bending, and lifting   [] Reduced ability or tolerance with driving and/or computer work   [x]Reduced ability to sleep   [x]Reduced ability to perform lifting, reaching, carrying tasks   [x]Reduced ability to tolerate impact through UE   [x]Reduced ability to reach behind back   [x]Reduced ability to  or hold objects   []Reduced ability to throw or toss an object   []other:    Participation Restrictions   [x]Reduced participation in self care activities   [x]Reduced participation in home management activities   [x]Reduced participation in work activities   [x]Reduced participation in social activities. [x]Reduced participation in sport/recreation activities. Classification:   [x]Signs/symptoms consistent with post-surgical status including decreased ROM, strength and function.   []Signs/symptoms consistent with joint sprain/strain   []Signs/symptoms consistent with shoulder impingement   []Signs/symptoms consistent with shoulder/elbow/wrist tendinopathy   []Signs/symptoms consistent with Rotator cuff tear   []Signs/symptoms consistent with labral tear   []Signs/symptoms consistent with postural dysfunction    []Signs/symptoms consistent with Glenohumeral IR Deficit - <45 degrees   []Signs/symptoms consistent with facet dysfunction of cervical/thoracic spine    []Signs/symptoms consistent with pathology which may benefit from Dry needling     []other:     Prognosis/Rehab Potential:      []Excellent   [x]Good    []Fair    []Poor    Tolerance of evaluation/treatment:    []Excellent   [x]Good    []Fair   []Poor    Physical Therapy Evaluation Complexity Justification  [x] A history of present problem with:  [] no personal factors and/or comorbidities that impact the plan of care;  [x]1-2 personal factors and/or comorbidities that impact the plan of care  []3 personal factors and/or comorbidities that impact the plan of care  [x] An examination of body systems using standardized tests and measures addressing any of the following: body structures and functions (impairments), activity limitations, and/or participation restrictions;:  [x] a total of 1-2 or more elements   [] a total of 3 or more elements   [] a total of 4 or more elements   [x] A clinical presentation with:  [x] stable and/or uncomplicated characteristics   [] evolving clinical presentation with changing characteristics  [] unstable and unpredictable characteristics;   [x] Clinical decision making of [] low, [] moderate, [] high complexity using standardized patient assessment instrument and/or measurable assessment of functional outcome. [x] EVAL (LOW) 54196 (typically 20 minutes face-to-face)  [] EVAL (MOD) 74534 (typically 30 minutes face-to-face)  [] EVAL (HIGH) 11076 (typically 45 minutes face-to-face)  [] RE-EVAL     PLAN:  Frequency/Duration:  2 days per week for 12 Weeks:  INTERVENTIONS:  [x] Therapeutic exercise including: strength training, ROM, for Upper extremity and core   [x]  NMR activation and proprioception for UE, scap and Core   [x] Manual therapy as indicated for shoulder, scapula and spine to include: ASTM, STM, PROM, Gr I-IV mobilizations,.   [x] Modalities as needed that may include: thermal agents, E-stim, Biofeedback, US, iontophoresis as indicated  [x] Patient education on joint protection, postural re-education, activity modification, progression of HEP. HEP instruction: [id number](see scanned forms)    GOALS:  Patient stated goal: Improve motion, bowl    Therapist goals for Patient:   Short Term Goals: To be achieved in: 2 weeks  1. Independent in HEP and progression per patient tolerance, in order to prevent re-injury. [] Progressing: [] Met: [] Not Met: [] Adjusted     2. Patient will have a decrease in pain to facilitate improvement in movement, function, and ADLs as indicated by Functional Deficits. [] Progressing: [] Met: [] Not Met: [] Adjusted     Long Term Goals: To be achieved in: 16 weeks  1. Disability index score of 40 % or less for the DASH to assist with reaching prior level of function. [] Progressing: [] Met: [] Not Met: [] Adjusted     2. Patient will demonstrate increased AROM flex/abd/ER/IR to 140/140/50/L1 to allow for proper joint functioning as indicated by patients Functional Deficits. [] Progressing: [] Met: [] Not Met: [] Adjusted     3. Patient will demonstrate an increase in Strength of shoulder to 5/5 to allow for proper functional mobility as indicated by patients Functional Deficits. [] Progressing: [] Met: [] Not Met: [] Adjusted     4. Patient will return to functional activities without increased symptoms or restriction. [] Progressing: [] Met: [] Not Met: [] Adjusted     5.  Pt will return to bowling. (patient specific functional goal)    [] Progressing: [] Met: [] Not Met: [] Adjusted      Electronically signed by:  Trip Amin PT

## 2022-02-15 ENCOUNTER — TELEPHONE (OUTPATIENT)
Dept: ORTHOPEDIC SURGERY | Age: 47
End: 2022-02-15

## 2022-02-21 ENCOUNTER — HOSPITAL ENCOUNTER (OUTPATIENT)
Dept: PHYSICAL THERAPY | Age: 47
Setting detail: THERAPIES SERIES
Discharge: HOME | End: 2022-02-21
Payer: MEDICARE

## 2022-02-21 PROCEDURE — 97112 NEUROMUSCULAR REEDUCATION: CPT

## 2022-02-21 PROCEDURE — 97140 MANUAL THERAPY 1/> REGIONS: CPT

## 2022-02-21 PROCEDURE — 97110 THERAPEUTIC EXERCISES: CPT

## 2022-02-21 NOTE — FLOWSHEET NOTE
[address] [facility], [address] [place] [address], [address] [place] [address] [address]  Phone: [phone]   Fax:     [phone]      Physical Therapy Treatment Note/ Progress Report:           Date:  2022    Patient Name:  Evelyn Andres    :  1975  MRN: [id number]  Restrictions/Precautions:    Medical/Treatment Diagnosis Information:  · Diagnosis: M25.512 (ICD-10-CM) - Left shoulder pain, unspecified chronicity; Z96.612 (ICD-10-CM) - Status post left shoulder hemiarthroplasty  · Treatment Diagnosis: Left shoulder pain, decreased ROm, strength  Insurance/Certification information:  PT Insurance Information: Medicare/medicaid  Physician Information:  Referring Practitioner: Alex Chan MD  Has the plan of care been signed (Y/N):        []  Yes  [x]  No     Date of Patient follow up with Physician:     Assessment Summary: AquaHydrate is a 42 y.o. female reporting to OP PT s/p left TSA on 22. Pt is noted to be doing rather well. ROM and strength testing deferred. Will progress per protocol. Is this a Progress Report:     []  Yes  [x]  No        If Yes:  Date Range for reporting period:  Beginnin22  Ending:   3/14/22    Progress report will be due (10 Rx or 30 days whichever is less):        Recertification will be due (POC Duration  / 90 days whichever is less): 22          Visit # Insurance Allowable Auth Required   In [facility] 17 []  Yes     []  No    Tele Health   []  Yes     []  No    Total 2         Functional Scale: Quick Dash 84%   Date assessed:     Latex Allergy:  [x]NO      []YES  Preferred Language for Healthcare:   [x]English       []other:      Pain level:  0/10         SUBJECTIVE:  Pt states shoulder feels good.  Eating with left arm again        OBJECTIVE:     PROM:  Flexion: 120  Abduction:  ER:  IR:                RESTRICTIONS/PRECAUTIONS: No passive ER past 20°/flexion past 120°, No active internal rotation, No pendulums    Exercises/Interventions: HEP code: [id number]  Therapeutic Ex (27044) HEP 2/14/2 2/21/22    Warm-up       Pulleys   x20    UBE              TABLE       Supine cane flexion   10x2    Supine concentric circles   x20 ea    SL ER   10x3                                       SEATED       Table slides x x20     Scap squeeze x x20 x30    Gripping x x20     PROM cane ER to neutral x x20 --    Elbow flexion x x20 10x3           STANDING   10x3, OTB    [place]       Shoulder extension   10x3                                  Manual: PROM shoulder flexion to 120°, ER to 20°, elbow flex/ext 15'        Therapeutic Exercise and NMR EXR  [x] (68379) Provided verbal/tactile cueing for activities related to strengthening, flexibility, endurance, ROM  for improvements in scapular, scapulothoracic and UE control with self care, reaching, carrying, lifting, house/yardwork, driving/computer work.    [] (72285) Provided verbal/tactile cueing for activities related to improving balance, coordination, kinesthetic sense, posture, motor skill, proprioception  to assist with  scapular, scapulothoracic and UE control with self care, reaching, carrying, lifting, house/yardwork, driving/computer work. Therapeutic Activities:    [x] (85499 or 40040) Provided verbal/tactile cueing for activities related to improving balance, coordination, kinesthetic sense, posture, motor skill, proprioception and motor activation to allow for proper function of scapular, scapulothoracic and UE control with self care, carrying, lifting, driving/computer work.      Home Exercise Program:    [x] (71939) Reviewed/Progressed HEP activities related to strengthening, flexibility, endurance, ROM of scapular, scapulothoracic and UE control with self care, reaching, carrying, lifting, house/yardwork, driving/computer work  [] (43054) Reviewed/Progressed HEP activities related to improving balance, coordination, kinesthetic sense, posture, motor skill, proprioception of scapular, scapulothoracic and UE control with self care, reaching, carrying, lifting, house/yardwork, driving/computer work      Manual Treatments:  PROM / STM / Oscillations-Mobs:  G-I, II, III, IV (PA's, Inf., Post.)  [x] (09959) Provided manual therapy to mobilize soft tissue/joints of cervical/CT, scapular GHJ and UE for the purpose of modulating pain, promoting relaxation,  increasing ROM, reducing/eliminating soft tissue swelling/inflammation/restriction, improving soft tissue extensibility and allowing for proper ROM for normal function with self care, reaching, carrying, lifting, house/yardwork, driving/computer work    Modalities:     [] GAME READY (VASO)- for significant edema, swelling, pain control. Charges:  Timed Code Treatment Minutes: 53   Total Treatment Minutes:  53   BWC:  TE TIME:  NMR TIME:  MANUAL TIME:  TA  UNTIMED MINUTES:  Medicare Total:   23  15  15            [] EVAL (LOW) 42134 (typically 20 minutes face-to-face)  [] EVAL (MOD) 05989 (typically 30 minutes face-to-face)  [] EVAL (HIGH) 87209 (typically 45 minutes face-to-face)  [] RE-EVAL     [x] WC(49631) 2     [] IONTO  [x] NMR (12359) 1     [] VASO  [x] Manual (13423) 1     [] Other:  [] TA x      [] Mech Traction (31709)  [] ES(attended) (00992)      [] ES (un) (96536):           GOALS:     Patient stated goal: Improve motion, bowl     Therapist goals for Patient:   Short Term Goals: To be achieved in: 2 weeks  1. Independent in HEP and progression per patient tolerance, in order to prevent re-injury. []? Progressing: []? Met: []? Not Met: []? Adjusted      2. Patient will have a decrease in pain to facilitate improvement in movement, function, and ADLs as indicated by Functional Deficits. []? Progressing: []? Met: []? Not Met: []? Adjusted      Long Term Goals: To be achieved in: 16 weeks  1. Disability index score of 40 % or less for the DASH to assist with reaching prior level of function.    []? Progressing: []? Met: []? Not Met: []? Adjusted      2. Patient will demonstrate increased AROM flex/abd/ER/IR to 140/140/50/L1 to allow for proper joint functioning as indicated by patients Functional Deficits. []? Progressing: []? Met: []? Not Met: []? Adjusted      3. Patient will demonstrate an increase in Strength of shoulder to 5/5 to allow for proper functional mobility as indicated by patients Functional Deficits. []? Progressing: []? Met: []? Not Met: []? Adjusted      4. Patient will return to functional activities without increased symptoms or restriction. []? Progressing: []? Met: []? Not Met: []? Adjusted      5. Pt will return to bowling. (patient specific functional goal)    []? Progressing: []? Met: []? Not Met: []? Adjusted              ASSESSMENT:  Pt brie session well. Able to make several progressions. Verbal and tactile cues needed throughout with several rest breaks. Patient received education on their current pathology and how their condition effects them with their functional activities. Patient understood discussion and questions were answered. Patient understands their activity limitations and understands rational for treatment progression. Pt educated on plan of care and HEP, if worsening symptoms to d/c that exercise. PLAN: See eval  [x] Continue per plan of care [] Alter current plan (see comments above)  [] Plan of care initiated [] Hold pending MD visit [] Discharge      Electronically signed by:  Augusto Benitez PT    Note: If patient does not return for scheduled/ recommended follow up visits, this note will serve as a discharge from care along with most recent update on progress.

## 2022-03-01 ENCOUNTER — HOSPITAL ENCOUNTER (OUTPATIENT)
Dept: PHYSICAL THERAPY | Age: 47
Setting detail: THERAPIES SERIES
Discharge: HOME | End: 2022-03-01
Payer: MEDICARE

## 2022-03-01 PROCEDURE — 97112 NEUROMUSCULAR REEDUCATION: CPT

## 2022-03-01 PROCEDURE — 97140 MANUAL THERAPY 1/> REGIONS: CPT

## 2022-03-01 PROCEDURE — 97110 THERAPEUTIC EXERCISES: CPT

## 2022-03-01 NOTE — FLOWSHEET NOTE
[address] [place] [address] and Sports Rehabilitation[address], [address] [place] [address] [address]  Phone: [phone]   Fax:     [phone]      Physical Therapy Treatment Note/ Progress Report:           Date:  3/1/2022    Patient Name:  Yoana Ramos    :  1975  MRN: [id number]  Restrictions/Precautions:    Medical/Treatment Diagnosis Information:  · Diagnosis: M25.512 (ICD-10-CM) - Left shoulder pain, unspecified chronicity; Z96.612 (ICD-10-CM) - Status post left shoulder hemiarthroplasty  · Treatment Diagnosis: Left shoulder pain, decreased ROm, strength  Insurance/Certification information:  PT Insurance Information: Medicare/medicaid  Physician Information:  Referring Practitioner: Brayan Ellsworth MD  Has the plan of care been signed (Y/N):        []  Yes  [x]  No     Date of Patient follow up with Physician:     Assessment Summary: Manuelito Diallo is a 42 y.o. female reporting to OP PT s/p left TSA on 22. Pt is noted to be doing rather well. ROM and strength testing deferred. Will progress per protocol. Is this a Progress Report:     []  Yes  [x]  No        If Yes:  Date Range for reporting period:  Beginnin22  Ending:   3/14/22    Progress report will be due (10 Rx or 30 days whichever is less):        Recertification will be due (POC Duration  / 90 days whichever is less): 22          Visit # Insurance Allowable Auth Required   In [facility] 17 []  Yes     []  No    Tele Health   []  Yes     []  No    Total 3         Functional Scale: Quick Dash 84%   Date assessed:     Latex Allergy:  [x]NO      []YES  Preferred Language for Healthcare:   [x]English       []other:      Pain level:  0/10         SUBJECTIVE:  Pt states shoulder feels good.        OBJECTIVE:     PROM:  Flexion: 120  Abduction:  ER: 20  IR:                RESTRICTIONS/PRECAUTIONS: No passive ER past 20°/flexion past 120°, No active internal rotation, No pendulums    Exercises/Interventions: HEP code: [id number]  Therapeutic Ex (06994) HEP 2/14/2 2/21/22 3/1/22   Warm-up       Pulleys   x20 x30   UBE              TABLE       Supine cane flexion   10x2    Supine punch ups    10x2   Supine concentric circles   x20 ea x30 ea   SL ER   10x3 10x2                                      SEATED       Table slides x x20     Scap squeeze x x20 x30    Gripping x x20     PROM cane ER to neutral x x20 --    Elbow flexion x x20 10x3 10x3, 1#          STANDING   10x3, OTB    [place]    10x3, GTB   Shoulder extension   10x3    Rows    10x3, GTB   SB table rolls    10x2                   Manual: PROM shoulder flexion to 120°, ER to 20°, elbow flex/ext 12'        Therapeutic Exercise and NMR EXR  [x] (27324) Provided verbal/tactile cueing for activities related to strengthening, flexibility, endurance, ROM  for improvements in scapular, scapulothoracic and UE control with self care, reaching, carrying, lifting, house/yardwork, driving/computer work.    [] (78983) Provided verbal/tactile cueing for activities related to improving balance, coordination, kinesthetic sense, posture, motor skill, proprioception  to assist with  scapular, scapulothoracic and UE control with self care, reaching, carrying, lifting, house/yardwork, driving/computer work. Therapeutic Activities:    [x] (41217 or 98423) Provided verbal/tactile cueing for activities related to improving balance, coordination, kinesthetic sense, posture, motor skill, proprioception and motor activation to allow for proper function of scapular, scapulothoracic and UE control with self care, carrying, lifting, driving/computer work.      Home Exercise Program:    [x] (06020) Reviewed/Progressed HEP activities related to strengthening, flexibility, endurance, ROM of scapular, scapulothoracic and UE control with self care, reaching, carrying, lifting, house/yardwork, driving/computer work  [] (15912) Reviewed/Progressed HEP activities less for the DASH to assist with reaching prior level of function. []? Progressing: []? Met: []? Not Met: []? Adjusted      2. Patient will demonstrate increased AROM flex/abd/ER/IR to 140/140/50/L1 to allow for proper joint functioning as indicated by patients Functional Deficits. []? Progressing: []? Met: []? Not Met: []? Adjusted      3. Patient will demonstrate an increase in Strength of shoulder to 5/5 to allow for proper functional mobility as indicated by patients Functional Deficits. []? Progressing: []? Met: []? Not Met: []? Adjusted      4. Patient will return to functional activities without increased symptoms or restriction. []? Progressing: []? Met: []? Not Met: []? Adjusted      5. Pt will return to bowling. (patient specific functional goal)    []? Progressing: []? Met: []? Not Met: []? Adjusted              ASSESSMENT:  Pt brie session well. Continues to needs cues through session. Overall motion progression well and reporting minimal to no pain. Patient received education on their current pathology and how their condition effects them with their functional activities. Patient understood discussion and questions were answered. Patient understands their activity limitations and understands rational for treatment progression. Pt educated on plan of care and HEP, if worsening symptoms to d/c that exercise. PLAN: See eval  [x] Continue per plan of care [] Alter current plan (see comments above)  [] Plan of care initiated [] Hold pending MD visit [] Discharge      Electronically signed by:  Amber Banda, PT    Note: If patient does not return for scheduled/ recommended follow up visits, this note will serve as a discharge from care along with most recent update on progress.

## 2022-03-03 ENCOUNTER — HOSPITAL ENCOUNTER (OUTPATIENT)
Dept: PHYSICAL THERAPY | Age: 47
Setting detail: THERAPIES SERIES
Discharge: HOME | End: 2022-03-03
Payer: MEDICARE

## 2022-03-03 PROCEDURE — 97110 THERAPEUTIC EXERCISES: CPT

## 2022-03-03 PROCEDURE — 97140 MANUAL THERAPY 1/> REGIONS: CPT

## 2022-03-03 PROCEDURE — 97112 NEUROMUSCULAR REEDUCATION: CPT

## 2022-03-03 NOTE — FLOWSHEET NOTE
[address] [place] [address] and Sports Rehabilitation[address], [address] [place] [address] [address]  Phone: [phone]   Fax:     [phone]      Physical Therapy Treatment Note/ Progress Report:           Date:  3/3/2022    Patient Name:  Esthela Postal    :  1975  MRN: [id number]  Restrictions/Precautions:    Medical/Treatment Diagnosis Information:  · Diagnosis: M25.512 (ICD-10-CM) - Left shoulder pain, unspecified chronicity; Z96.612 (ICD-10-CM) - Status post left shoulder hemiarthroplasty  · Treatment Diagnosis: Left shoulder pain, decreased ROm, strength  Insurance/Certification information:  PT Insurance Information: Medicare/medicaid  Physician Information:  Referring Practitioner: Quiana Girard MD  Has the plan of care been signed (Y/N):        []  Yes  [x]  No     Date of Patient follow up with Physician:     Assessment Summary: Vita Whitfield is a 42 y.o. female reporting to OP PT s/p left TSA on 22. Pt is noted to be doing rather well. ROM and strength testing deferred. Will progress per protocol. Is this a Progress Report:     []  Yes  [x]  No        If Yes:  Date Range for reporting period:  Beginnin22  Ending:   3/14/22    Progress report will be due (10 Rx or 30 days whichever is less):        Recertification will be due (POC Duration  / 90 days whichever is less): 22          Visit # Insurance Allowable Auth Required   In [facility] 17 []  Yes     []  No    Tele Health   []  Yes     []  No    Total 4         Functional Scale: Quick Dash 84%   Date assessed:     Latex Allergy:  [x]NO      []YES  Preferred Language for Healthcare:   [x]English       []other:      Pain level:  0/10         SUBJECTIVE:  Pt states shoulder feels good.        OBJECTIVE:     PROM:  Flexion: 120  Abduction:  ER: 20  IR:    6 weeks - 3/2/22  8 weeks - 3/16   10 weeks -   12 weeks -               RESTRICTIONS/PRECAUTIONS: No passive ER past 20°/flexion past 120°, No active internal rotation, No pendulums    Exercises/Interventions: HEP code: [id number]  Therapeutic Ex (23779) HEP 2/14/2 2/21/22 3/1/22 3/3/22   Warm-up        Pulleys   x20 x30 x30   UBE                TABLE        Supine cane flexion   10x2  --   Supine punch ups    10x2 10x3, 1#   Supine concentric circles   x20 ea x30 ea x30 ea   SL ER   10x3 10x2 10x2, 1#                                           SEATED        Table slides x x20      Scap squeeze x x20 x30     Gripping x x20      PROM cane ER to neutral x x20 --     Elbow flexion x x20 10x3 10x3, 1#            STANDING   10x3, OTB     [place]    10x3, GTB    Extension x  10x3  X20, GTB   Rows x   10x3, GTB X30, BTB   Shrug x    X20, BTB   SB table rolls    10x2 x20   SB table circles     x10 ea             Manual: PROM shoulder flexion to 120°, ER to 20°, elbow flex/ext 12'        Therapeutic Exercise and NMR EXR  [x] (27499) Provided verbal/tactile cueing for activities related to strengthening, flexibility, endurance, ROM  for improvements in scapular, scapulothoracic and UE control with self care, reaching, carrying, lifting, house/yardwork, driving/computer work.    [] (97141) Provided verbal/tactile cueing for activities related to improving balance, coordination, kinesthetic sense, posture, motor skill, proprioception  to assist with  scapular, scapulothoracic and UE control with self care, reaching, carrying, lifting, house/yardwork, driving/computer work. Therapeutic Activities:    [x] (63596 or 28561) Provided verbal/tactile cueing for activities related to improving balance, coordination, kinesthetic sense, posture, motor skill, proprioception and motor activation to allow for proper function of scapular, scapulothoracic and UE control with self care, carrying, lifting, driving/computer work.      Home Exercise Program:    [x] (72722) Reviewed/Progressed HEP activities related to strengthening, flexibility, endurance, ROM of scapular, scapulothoracic and UE control with self care, reaching, carrying, lifting, house/yardwork, driving/computer work  [] (45911) Reviewed/Progressed HEP activities related to improving balance, coordination, kinesthetic sense, posture, motor skill, proprioception of scapular, scapulothoracic and UE control with self care, reaching, carrying, lifting, house/yardwork, driving/computer work      Manual Treatments:  PROM / STM / Oscillations-Mobs:  G-I, II, III, IV (PA's, Inf., Post.)  [x] (50807) Provided manual therapy to mobilize soft tissue/joints of cervical/CT, scapular GHJ and UE for the purpose of modulating pain, promoting relaxation,  increasing ROM, reducing/eliminating soft tissue swelling/inflammation/restriction, improving soft tissue extensibility and allowing for proper ROM for normal function with self care, reaching, carrying, lifting, house/yardwork, driving/computer work    Modalities:     [] GAME READY (VASO)- for significant edema, swelling, pain control. Charges:  Timed Code Treatment Minutes: 45   Total Treatment Minutes:  45   BWC:  TE TIME:  NMR TIME:  MANUAL TIME:  TA  UNTIMED MINUTES:  Medicare Total:   20  13  12            [] EVAL (LOW) 10426 (typically 20 minutes face-to-face)  [] EVAL (MOD) 71345 (typically 30 minutes face-to-face)  [] EVAL (HIGH) 83957 (typically 45 minutes face-to-face)  [] RE-EVAL     [x] DI(02803) 1     [] IONTO  [x] NMR (90785) 1     [] VASO  [x] Manual (97748) 1     [] Other:  [] TA x      [] Mech Traction (11600)  [] ES(attended) (73485)      [] ES (un) (31217):           GOALS:     Patient stated goal: Improve motion, bowl     Therapist goals for Patient:   Short Term Goals: To be achieved in: 2 weeks  1. Independent in HEP and progression per patient tolerance, in order to prevent re-injury. []? Progressing: []? Met: []? Not Met: []? Adjusted      2.  Patient will have a decrease in pain to facilitate improvement in movement, function, and ADLs as indicated by Functional Deficits. []? Progressing: []? Met: []? Not Met: []? Adjusted      Long Term Goals: To be achieved in: 16 weeks  1. Disability index score of 40 % or less for the DASH to assist with reaching prior level of function. []? Progressing: []? Met: []? Not Met: []? Adjusted      2. Patient will demonstrate increased AROM flex/abd/ER/IR to 140/140/50/L1 to allow for proper joint functioning as indicated by patients Functional Deficits. []? Progressing: []? Met: []? Not Met: []? Adjusted      3. Patient will demonstrate an increase in Strength of shoulder to 5/5 to allow for proper functional mobility as indicated by patients Functional Deficits. []? Progressing: []? Met: []? Not Met: []? Adjusted      4. Patient will return to functional activities without increased symptoms or restriction. []? Progressing: []? Met: []? Not Met: []? Adjusted      5. Pt will return to bowling. (patient specific functional goal)    []? Progressing: []? Met: []? Not Met: []? Adjusted              ASSESSMENT:  Pt brie session well. Quality of motion looks very good at this stage. Patient received education on their current pathology and how their condition effects them with their functional activities. Patient understood discussion and questions were answered. Patient understands their activity limitations and understands rational for treatment progression. Pt educated on plan of care and HEP, if worsening symptoms to d/c that exercise. PLAN: See eval  [x] Continue per plan of care [] Alter current plan (see comments above)  [] Plan of care initiated [] Hold pending MD visit [] Discharge      Electronically signed by:  Solomon Alexander PT    Note: If patient does not return for scheduled/ recommended follow up visits, this note will serve as a discharge from care along with most recent update on progress.

## 2022-03-08 ENCOUNTER — HOSPITAL ENCOUNTER (OUTPATIENT)
Dept: PHYSICAL THERAPY | Age: 47
Setting detail: THERAPIES SERIES
Discharge: HOME | End: 2022-03-08
Payer: MEDICARE

## 2022-03-08 PROCEDURE — 97140 MANUAL THERAPY 1/> REGIONS: CPT

## 2022-03-08 PROCEDURE — 97112 NEUROMUSCULAR REEDUCATION: CPT

## 2022-03-08 PROCEDURE — 97110 THERAPEUTIC EXERCISES: CPT

## 2022-03-08 NOTE — FLOWSHEET NOTE
[address] [facility], [address] [place] [address], [address] [place] [address] [address]  Phone: [phone]   Fax:     [phone]      Physical Therapy Treatment Note/ Progress Report:           Date:  3/8/2022    Patient Name:  Karine Frazier    :  1975  MRN: [id number]  Restrictions/Precautions:    Medical/Treatment Diagnosis Information:  · Diagnosis: M25.512 (ICD-10-CM) - Left shoulder pain, unspecified chronicity; Z96.612 (ICD-10-CM) - Status post left shoulder hemiarthroplasty  · Treatment Diagnosis: Left shoulder pain, decreased ROm, strength  Insurance/Certification information:  PT Insurance Information: Medicare/medicaid  Physician Information:  Referring Practitioner: Hermes Brenner MD  Has the plan of care been signed (Y/N):        []  Yes  [x]  No     Date of Patient follow up with Physician:     Assessment Summary: Coral Garcia is a 42 y.o. female reporting to OP PT s/p left TSA on 22. Pt is noted to be doing rather well. ROM and strength testing deferred. Will progress per protocol. Is this a Progress Report:     []  Yes  [x]  No        If Yes:  Date Range for reporting period:  Beginnin22  Ending:   3/14/22    Progress report will be due (10 Rx or 30 days whichever is less):        Recertification will be due (POC Duration  / 90 days whichever is less): 22          Visit # Insurance Allowable Auth Required   In [facility] 17 []  Yes     []  No    Tele Health   []  Yes     []  No    Total 5         Functional Scale: Quick Dash 84%   Date assessed:     Latex Allergy:  [x]NO      []YES  Preferred Language for Healthcare:   [x]English       []other:      Pain level:  0/10         SUBJECTIVE:  Pt states shoulder feels good.        OBJECTIVE:     PROM:  Flexion: 120  Abduction:  ER: 20  IR:    6 weeks - 3/2/22  8 weeks - 3/16   10 weeks -   12 weeks -               RESTRICTIONS/PRECAUTIONS: No passive ER past 20°/flexion past 120°, No active internal rotation, No pendulums    Exercises/Interventions: HEP code: [id number]  Therapeutic Ex (08671) HEP 3/1/22 3/3/22 3/8/22    Warm-up        Pulleys  x30 x30 x30    UBE                TABLE        Supine cane flexion   --     Supine punch ups  10x2 10x3, 1# 10x3, 2#    Supine concentric circles  x30 ea x30 ea x30 ea    SL ER x 10x2 10x2, 1#                                             SEATED        Table slides x       Scap squeeze x       Gripping x       PROM cane ER to neutral x       Elbow flexion x 10x3, 1#  10x2, 2#            STANDING        [place]  10x3, GTB  X30, BTB    Extension x  X20, GTB X20, GTB    Rows x 10x3, GTB X30, BTB X30, BTB    Shrug x  X20, BTB X20, 5#    SB table rolls  10x2 x20 --    SB table circles   x10 ea     Saw ball    10x2    Wall slides x   10x2      Manual: PROM shoulder flexion to 120°, ER to 20°, elbow flex/ext 12'        Therapeutic Exercise and NMR EXR  [x] (87121) Provided verbal/tactile cueing for activities related to strengthening, flexibility, endurance, ROM  for improvements in scapular, scapulothoracic and UE control with self care, reaching, carrying, lifting, house/yardwork, driving/computer work.    [] (19392) Provided verbal/tactile cueing for activities related to improving balance, coordination, kinesthetic sense, posture, motor skill, proprioception  to assist with  scapular, scapulothoracic and UE control with self care, reaching, carrying, lifting, house/yardwork, driving/computer work. Therapeutic Activities:    [x] (42192 or 04401) Provided verbal/tactile cueing for activities related to improving balance, coordination, kinesthetic sense, posture, motor skill, proprioception and motor activation to allow for proper function of scapular, scapulothoracic and UE control with self care, carrying, lifting, driving/computer work.      Home Exercise Program:    [x] (42668) Reviewed/Progressed HEP activities related to strengthening, flexibility, endurance, ROM of scapular, scapulothoracic and UE control with self care, reaching, carrying, lifting, house/yardwork, driving/computer work  [] (93151) Reviewed/Progressed HEP activities related to improving balance, coordination, kinesthetic sense, posture, motor skill, proprioception of scapular, scapulothoracic and UE control with self care, reaching, carrying, lifting, house/yardwork, driving/computer work      Manual Treatments:  PROM / STM / Oscillations-Mobs:  G-I, II, III, IV (PA's, Inf., Post.)  [x] (98600) Provided manual therapy to mobilize soft tissue/joints of cervical/CT, scapular GHJ and UE for the purpose of modulating pain, promoting relaxation,  increasing ROM, reducing/eliminating soft tissue swelling/inflammation/restriction, improving soft tissue extensibility and allowing for proper ROM for normal function with self care, reaching, carrying, lifting, house/yardwork, driving/computer work    Modalities:     [] GAME READY (VASO)- for significant edema, swelling, pain control. Charges:  Timed Code Treatment Minutes: 45   Total Treatment Minutes:  45   BWC:  TE TIME:  NMR TIME:  MANUAL TIME:  TA  UNTIMED MINUTES:  Medicare Total:   22  13  10            [] EVAL (LOW) 20405 (typically 20 minutes face-to-face)  [] EVAL (MOD) 99639 (typically 30 minutes face-to-face)  [] EVAL (HIGH) 48023 (typically 45 minutes face-to-face)  [] RE-EVAL     [x] OB(03294) 1     [] IONTO  [x] NMR (44175) 1     [] VASO  [x] Manual (18807) 1     [] Other:  [] TA x      [] Mech Traction (85726)  [] ES(attended) (13447)      [] ES (un) (71451):           GOALS:     Patient stated goal: Improve motion, bowl     Therapist goals for Patient:   Short Term Goals: To be achieved in: 2 weeks  1. Independent in HEP and progression per patient tolerance, in order to prevent re-injury. []? Progressing: []? Met: []? Not Met: []? Adjusted      2.  Patient will have a decrease in pain to facilitate improvement in movement, function, and ADLs as indicated by Functional Deficits. []? Progressing: []? Met: []? Not Met: []? Adjusted      Long Term Goals: To be achieved in: 16 weeks  1. Disability index score of 40 % or less for the DASH to assist with reaching prior level of function. []? Progressing: []? Met: []? Not Met: []? Adjusted      2. Patient will demonstrate increased AROM flex/abd/ER/IR to 140/140/50/L1 to allow for proper joint functioning as indicated by patients Functional Deficits. []? Progressing: []? Met: []? Not Met: []? Adjusted      3. Patient will demonstrate an increase in Strength of shoulder to 5/5 to allow for proper functional mobility as indicated by patients Functional Deficits. []? Progressing: []? Met: []? Not Met: []? Adjusted      4. Patient will return to functional activities without increased symptoms or restriction. []? Progressing: []? Met: []? Not Met: []? Adjusted      5. Pt will return to bowling. (patient specific functional goal)    []? Progressing: []? Met: []? Not Met: []? Adjusted              ASSESSMENT:  Pt brie session well. ROM looking very good. Patient received education on their current pathology and how their condition effects them with their functional activities. Patient understood discussion and questions were answered. Patient understands their activity limitations and understands rational for treatment progression. Pt educated on plan of care and HEP, if worsening symptoms to d/c that exercise. PLAN: See eval  [x] Continue per plan of care [] Alter current plan (see comments above)  [] Plan of care initiated [] Hold pending MD visit [] Discharge      Electronically signed by:  Yola Whyte, PT    Note: If patient does not return for scheduled/ recommended follow up visits, this note will serve as a discharge from care along with most recent update on progress.

## 2022-03-10 ENCOUNTER — HOSPITAL ENCOUNTER (OUTPATIENT)
Dept: PHYSICAL THERAPY | Age: 47
Setting detail: THERAPIES SERIES
Discharge: HOME | End: 2022-03-10
Payer: MEDICARE

## 2022-03-10 PROCEDURE — 97140 MANUAL THERAPY 1/> REGIONS: CPT

## 2022-03-10 PROCEDURE — 97112 NEUROMUSCULAR REEDUCATION: CPT

## 2022-03-10 PROCEDURE — 97110 THERAPEUTIC EXERCISES: CPT

## 2022-03-10 NOTE — FLOWSHEET NOTE
[address] [facility], [address] [place] [address], [address] [place] [address] [address]  Phone: [phone]   Fax:     [phone]      Physical Therapy Treatment Note/ Progress Report:           Date:  3/10/2022    Patient Name:  Pia Blackwood    :  1975  MRN: [id number]  Restrictions/Precautions:    Medical/Treatment Diagnosis Information:  · Diagnosis: M25.512 (ICD-10-CM) - Left shoulder pain, unspecified chronicity; Z96.612 (ICD-10-CM) - Status post left shoulder hemiarthroplasty  · Treatment Diagnosis: Left shoulder pain, decreased ROm, strength  Insurance/Certification information:  PT Insurance Information: Medicare/medicaid  Physician Information:  Referring Practitioner: Darshan Pryor MD  Has the plan of care been signed (Y/N):        []  Yes  [x]  No     Date of Patient follow up with Physician:     Assessment Summary: Cj Todd is a 42 y.o. female reporting to OP PT s/p left TSA on 22. Pt is noted to be doing rather well. ROM and strength testing deferred. Will progress per protocol. Is this a Progress Report:     []  Yes  [x]  No        If Yes:  Date Range for reporting period:  Beginnin22  Ending:   3/14/22    Progress report will be due (10 Rx or 30 days whichever is less):        Recertification will be due (POC Duration  / 90 days whichever is less): 22          Visit # Insurance Allowable Auth Required   In [facility] 17 []  Yes     []  No    Tele Health   []  Yes     []  No    Total 6         Functional Scale: Quick Dash 84%   Date assessed:     Latex Allergy:  [x]NO      []YES  Preferred Language for Healthcare:   [x]English       []other:      Pain level:  0/10         SUBJECTIVE:  Pt states shoulder feels good.        OBJECTIVE:     PROM:  Flexion: 120  Abduction:  ER: 20  IR:    6 weeks - 3/2/22  8 weeks - 3/16   10 weeks -   12 weeks -               RESTRICTIONS/PRECAUTIONS: No passive ER past 20°/flexion past 120°, No active internal rotation, No pendulums    Exercises/Interventions: HEP code: [id number]  Therapeutic Ex (56747) HEP 3/1/22 3/3/22 3/8/22 3/10/22   Warm-up        Pulleys  x30 x30 x30 x30   UBE                TABLE        Supine cane flexion   --     Supine punch ups  10x2 10x3, 1# 10x3, 2# X20, 3#   Supine concentric circles  x30 ea x30 ea x30 ea x30 ea, 2#   SL ER x 10x2 10x2, 1#  10x2, 2#   SL Abduction     10x2                                   SEATED        Table slides x       Scap squeeze x       Gripping x       PROM cane ER to neutral x       Elbow flexion x 10x3, 1#  10x2, 2# X30, 2#           STANDING        [place]  10x3, GTB  X30, BTB X30, BTB   Extension x  X20, GTB X20, GTB X20, GTB   Rows x 10x3, GTB X30, BTB X30, BTB X30, BTB   Shrug x  X20, BTB X20, 5# X30, 5#   Wall ball circles     x10 ea   Saw ball    10x2 10x2   Wall slides x   10x2 10x2     Manual: PROM shoulder flexion to 120°, ER to 20°, elbow flex/ext 10'        Therapeutic Exercise and NMR EXR  [x] (67020) Provided verbal/tactile cueing for activities related to strengthening, flexibility, endurance, ROM  for improvements in scapular, scapulothoracic and UE control with self care, reaching, carrying, lifting, house/yardwork, driving/computer work.    [] (03489) Provided verbal/tactile cueing for activities related to improving balance, coordination, kinesthetic sense, posture, motor skill, proprioception  to assist with  scapular, scapulothoracic and UE control with self care, reaching, carrying, lifting, house/yardwork, driving/computer work. Therapeutic Activities:    [x] (41770 or 56666) Provided verbal/tactile cueing for activities related to improving balance, coordination, kinesthetic sense, posture, motor skill, proprioception and motor activation to allow for proper function of scapular, scapulothoracic and UE control with self care, carrying, lifting, driving/computer work.      Home Exercise Program:    [x] (95904) Reviewed/Progressed HEP activities related to strengthening, flexibility, endurance, ROM of scapular, scapulothoracic and UE control with self care, reaching, carrying, lifting, house/yardwork, driving/computer work  [] (46328) Reviewed/Progressed HEP activities related to improving balance, coordination, kinesthetic sense, posture, motor skill, proprioception of scapular, scapulothoracic and UE control with self care, reaching, carrying, lifting, house/yardwork, driving/computer work      Manual Treatments:  PROM / STM / Oscillations-Mobs:  G-I, II, III, IV (PA's, Inf., Post.)  [x] (56382) Provided manual therapy to mobilize soft tissue/joints of cervical/CT, scapular GHJ and UE for the purpose of modulating pain, promoting relaxation,  increasing ROM, reducing/eliminating soft tissue swelling/inflammation/restriction, improving soft tissue extensibility and allowing for proper ROM for normal function with self care, reaching, carrying, lifting, house/yardwork, driving/computer work    Modalities:     [] GAME READY (VASO)- for significant edema, swelling, pain control. Charges:  Timed Code Treatment Minutes: 45   Total Treatment Minutes:  45   BWC:  TE TIME:  NMR TIME:  MANUAL TIME:  TA  UNTIMED MINUTES:  Medicare Total:   22  13  10            [] EVAL (LOW) 14823 (typically 20 minutes face-to-face)  [] EVAL (MOD) 08860 (typically 30 minutes face-to-face)  [] EVAL (HIGH) 03626 (typically 45 minutes face-to-face)  [] RE-EVAL     [x] CM(21738) 1     [] IONTO  [x] NMR (89084) 1     [] VASO  [x] Manual (88543) 1     [] Other:  [] TA x      [] Mech Traction (55988)  [] ES(attended) (91837)      [] ES (un) (08283):           GOALS:     Patient stated goal: Improve motion, bowl     Therapist goals for Patient:   Short Term Goals: To be achieved in: 2 weeks  1. Independent in HEP and progression per patient tolerance, in order to prevent re-injury. []? Progressing: []? Met: []? Not Met: []? Adjusted      2.  Patient will have a decrease in pain to facilitate improvement in movement, function, and ADLs as indicated by Functional Deficits. []? Progressing: []? Met: []? Not Met: []? Adjusted      Long Term Goals: To be achieved in: 16 weeks  1. Disability index score of 40 % or less for the DASH to assist with reaching prior level of function. []? Progressing: []? Met: []? Not Met: []? Adjusted      2. Patient will demonstrate increased AROM flex/abd/ER/IR to 140/140/50/L1 to allow for proper joint functioning as indicated by patients Functional Deficits. []? Progressing: []? Met: []? Not Met: []? Adjusted      3. Patient will demonstrate an increase in Strength of shoulder to 5/5 to allow for proper functional mobility as indicated by patients Functional Deficits. []? Progressing: []? Met: []? Not Met: []? Adjusted      4. Patient will return to functional activities without increased symptoms or restriction. []? Progressing: []? Met: []? Not Met: []? Adjusted      5. Pt will return to bowling. (patient specific functional goal)    []? Progressing: []? Met: []? Not Met: []? Adjusted              ASSESSMENT:  Pt brie session well. ROM continues to progress nicely. Patient received education on their current pathology and how their condition effects them with their functional activities. Patient understood discussion and questions were answered. Patient understands their activity limitations and understands rational for treatment progression. Pt educated on plan of care and HEP, if worsening symptoms to d/c that exercise. PLAN: See eval  [x] Continue per plan of care [] Alter current plan (see comments above)  [] Plan of care initiated [] Hold pending MD visit [] Discharge      Electronically signed by:  Tyra Herrera PT    Note: If patient does not return for scheduled/ recommended follow up visits, this note will serve as a discharge from care along with most recent update on progress.

## 2022-03-15 ENCOUNTER — HOSPITAL ENCOUNTER (OUTPATIENT)
Dept: PHYSICAL THERAPY | Age: 47
Setting detail: THERAPIES SERIES
Discharge: HOME | End: 2022-03-15
Payer: MEDICARE

## 2022-03-15 PROCEDURE — 97110 THERAPEUTIC EXERCISES: CPT

## 2022-03-15 PROCEDURE — 97112 NEUROMUSCULAR REEDUCATION: CPT

## 2022-03-15 PROCEDURE — 97140 MANUAL THERAPY 1/> REGIONS: CPT

## 2022-03-15 NOTE — PROGRESS NOTES
[address] [facility], [address] [facility], [address] [place] [address] [address]  Phone: [phone]   Fax: [phone]    Date: 3/15/2022          Patient Name; :  Chayo Abler; 1975   Dx: Diagnosis: Q53.755 (ICD-10-CM) - Left shoulder pain, unspecified chronicity; W01.201 (ICD-10-CM) - Status post left shoulder hemiarthroplasty      Physician: Referring Practitioner: Mirella Odom MD        Total PT Visits: 7     Measures Previous Current   Pain (0-10)  0   Disability % 84 30       PROM:  Flexion: 120  Abduction:  ER: 20  IR:    Assessment:  Marquez Burgos has been seen in PT for 7 visits s/p left TSA. Pt has responded well overall to PT session which have been addressing progressive ROM and light strengthening activities. Currently have been avoiding active internal rotation or ER past 20. Pt will continue to benefit from PT addressing strength and deficits in order to regain full function and improve QOL. Prognosis for POC: [x] Good [] Fair  [] Poor      Patient requires continued skilled intervention: [x] Yes  [] No        Plan & Recommendations:  [x] Continue rehabilitation due to objective improvement and continued functional deficits with frequency and duration:   [] Progress toward  []GAP, []Work Conditioning, []Independent HEP   [] Discharge due to   [] All goals achieved, [] Maximized \"medical necessity\" [] No subjective or objective improvements      Electronically signed by:  Verna Stanton PT  Therapy Plan of Care Re-Certification  This patient has been re-evaluated for physical therapy services and for therapy to continue, Medicare, Medicaid and other insurances require periodic physician review of the treatment plan.  Please review the above re-evaluation and verify that you agree with plan of care as established above by signing the attached document and return it to our office or note changes to established plan below  [] Follow treatment plan as above [] Discontinue physical therapy  [] Change plan to:                                 __________________________________________________    Physician Signature:____________________________________ Date:____________  By signing above, therapists plan is approved by physician    If you have any questions or concerns, please don't hesitate to call.   Thank you for your referral.

## 2022-03-15 NOTE — FLOWSHEET NOTE
[address] [facility], [address] [place] [address], [address] [place] [address] [address]  Phone: [phone]   Fax:     [phone]      Physical Therapy Treatment Note/ Progress Report:           Date:  3/15/2022    Patient Name:  Ya Ernandez    :  1975  MRN: [id number]  Restrictions/Precautions:    Medical/Treatment Diagnosis Information:  · Diagnosis: M25.512 (ICD-10-CM) - Left shoulder pain, unspecified chronicity; Z96.612 (ICD-10-CM) - Status post left shoulder hemiarthroplasty  · Treatment Diagnosis: Left shoulder pain, decreased ROm, strength  Insurance/Certification information:  PT Insurance Information: Medicare/medicaid  Physician Information:  Referring Practitioner: Sol Benson MD  Has the plan of care been signed (Y/N):        []  Yes  [x]  No     Date of Patient follow up with Physician:     Assessment Summary: Deep Reeves is a 42 y.o. female reporting to OP PT s/p left TSA on 22. Pt is noted to be doing rather well. ROM and strength testing deferred. Will progress per protocol. Is this a Progress Report:     []  Yes  [x]  No        If Yes:  Date Range for reporting period:  Beginnin22  Ending:   3/14/22    Progress report will be due (10 Rx or 30 days whichever is less):        Recertification will be due (POC Duration  / 90 days whichever is less): 22          Visit # Insurance Allowable Auth Required   In [facility] []  Yes     []  No    Tele Health   []  Yes     []  No    Total 7         Functional Scale: Quick Dash 84% , 30%  Date assessed:     Latex Allergy:  [x]NO      []YES  Preferred Language for Healthcare:   [x]English       []other:      Pain level:  0/10         SUBJECTIVE:  Pt states she feels good.      OBJECTIVE:     PROM:  Flexion: 120  Abduction:  ER: 20  IR:    6 weeks - 3/2/22  8 weeks - 3/16   10 weeks -   12 weeks -               RESTRICTIONS/PRECAUTIONS: No passive ER past 20°/flexion past 120°, No active internal rotation, No pendulums    Exercises/Interventions: HEP code: [id number]  Therapeutic Ex (88088) HEP 3/3/22 3/8/22 3/10/22 3/15/22   Warm-up        Pulleys  x30 x30 x30 x30   UBE     2'/2', Lv 1           TABLE        Supine cane flexion  --      Supine punch ups  10x3, 1# 10x3, 2# X20, 3# X20, 3#   Supine concentric circles  x30 ea x30 ea x30 ea, 2# x30 ea, 3#   SL ER x 10x2, 1#  10x2, 2# 10x2, 3#   SL Abduction    10x2 10x2, 1#                                   SEATED        Table slides x       Scap squeeze x       Gripping x       PROM cane ER to neutral x       Elbow flexion x  10x2, 2# X30, 2#            STANDING        [place]   X30, BTB X30, BTB X30, BTB   Extension x X20, GTB X20, GTB X20, GTB X20, GTB   Rows x X30, BTB X30, BTB X30, BTB X20, BlkTB   Shrug x X20, BTB X20, 5# X30, 5# X30, 6#   Wall ball circles    x10 ea x10ea   Saw ball   10x2 10x2 x20   Wall slides x  10x2 10x2 --     Manual: PROM shoulder flexion to 120°, ER to 20°, elbow flex/ext 10'        Therapeutic Exercise and NMR EXR  [x] (11316) Provided verbal/tactile cueing for activities related to strengthening, flexibility, endurance, ROM  for improvements in scapular, scapulothoracic and UE control with self care, reaching, carrying, lifting, house/yardwork, driving/computer work.    [] (85563) Provided verbal/tactile cueing for activities related to improving balance, coordination, kinesthetic sense, posture, motor skill, proprioception  to assist with  scapular, scapulothoracic and UE control with self care, reaching, carrying, lifting, house/yardwork, driving/computer work. Therapeutic Activities:    [x] (69560 or 33423) Provided verbal/tactile cueing for activities related to improving balance, coordination, kinesthetic sense, posture, motor skill, proprioception and motor activation to allow for proper function of scapular, scapulothoracic and UE control with self care, carrying, lifting, driving/computer work.      Home Exercise Program:    [x] (45743) Reviewed/Progressed HEP activities related to strengthening, flexibility, endurance, ROM of scapular, scapulothoracic and UE control with self care, reaching, carrying, lifting, house/yardwork, driving/computer work  [] (28537) Reviewed/Progressed HEP activities related to improving balance, coordination, kinesthetic sense, posture, motor skill, proprioception of scapular, scapulothoracic and UE control with self care, reaching, carrying, lifting, house/yardwork, driving/computer work      Manual Treatments:  PROM / STM / Oscillations-Mobs:  G-I, II, III, IV (PA's, Inf., Post.)  [x] (33774) Provided manual therapy to mobilize soft tissue/joints of cervical/CT, scapular GHJ and UE for the purpose of modulating pain, promoting relaxation,  increasing ROM, reducing/eliminating soft tissue swelling/inflammation/restriction, improving soft tissue extensibility and allowing for proper ROM for normal function with self care, reaching, carrying, lifting, house/yardwork, driving/computer work    Modalities:     [] GAME READY (VASO)- for significant edema, swelling, pain control. Charges:  Timed Code Treatment Minutes: 45   Total Treatment Minutes:  45   BWC:  TE TIME:  NMR TIME:  MANUAL TIME:  TA  UNTIMED MINUTES:  Medicare Total:   22  13  10            [] EVAL (LOW) 81953 (typically 20 minutes face-to-face)  [] EVAL (MOD) 95897 (typically 30 minutes face-to-face)  [] EVAL (HIGH) 09912 (typically 45 minutes face-to-face)  [] RE-EVAL     [x] ED(77759) 1     [] IONTO  [x] NMR (35758) 1     [] VASO  [x] Manual (39912) 1     [] Other:  [] TA x      [] Mech Traction (94725)  [] ES(attended) (21658)      [] ES (un) (80970):           GOALS:     Patient stated goal: Improve motion, bowl     Therapist goals for Patient:   Short Term Goals: To be achieved in: 2 weeks  1. Independent in HEP and progression per patient tolerance, in order to prevent re-injury. []? Progressing: []? Met: []?  Not Met: []? Adjusted      2. Patient will have a decrease in pain to facilitate improvement in movement, function, and ADLs as indicated by Functional Deficits. []? Progressing: []? Met: []? Not Met: []? Adjusted      Long Term Goals: To be achieved in: 16 weeks  1. Disability index score of 40 % or less for the DASH to assist with reaching prior level of function. []? Progressing: []? Met: []? Not Met: []? Adjusted      2. Patient will demonstrate increased AROM flex/abd/ER/IR to 140/140/50/L1 to allow for proper joint functioning as indicated by patients Functional Deficits. []? Progressing: []? Met: []? Not Met: []? Adjusted      3. Patient will demonstrate an increase in Strength of shoulder to 5/5 to allow for proper functional mobility as indicated by patients Functional Deficits. []? Progressing: []? Met: []? Not Met: []? Adjusted      4. Patient will return to functional activities without increased symptoms or restriction. []? Progressing: []? Met: []? Not Met: []? Adjusted      5. Pt will return to bowling. (patient specific functional goal)    []? Progressing: []? Met: []? Not Met: []? Adjusted              ASSESSMENT:  Derek Ramey has been seen in PT for 7 visits s/p left TSA. Pt has responded well overall to PT session which have been addressing progressive ROM and light strengthening activities. Currently have been avoiding active internal rotation or ER past 20. Pt will continue to benefit from PT addressing strength and deficits in order to regain full function and improve QOL. Patient received education on their current pathology and how their condition effects them with their functional activities. Patient understood discussion and questions were answered. Patient understands their activity limitations and understands rational for treatment progression. Pt educated on plan of care and HEP, if worsening symptoms to d/c that exercise.            PLAN: See jett  [x] Continue per plan of care [] Alter current plan (see comments above)  [] Plan of care initiated [] Hold pending MD visit [] Discharge      Electronically signed by:  Verna Stanton PT    Note: If patient does not return for scheduled/ recommended follow up visits, this note will serve as a discharge from care along with most recent update on progress.

## 2022-03-17 ENCOUNTER — OFFICE VISIT (OUTPATIENT)
Dept: ORTHOPEDIC SURGERY | Age: 47
End: 2022-03-17

## 2022-03-17 VITALS — WEIGHT: 148 LBS | BODY MASS INDEX: 33.29 KG/M2 | HEIGHT: 56 IN

## 2022-03-17 DIAGNOSIS — Z96.612 STATUS POST LEFT SHOULDER HEMIARTHROPLASTY: Primary | ICD-10-CM

## 2022-03-17 DIAGNOSIS — M25.512 LEFT SHOULDER PAIN, UNSPECIFIED CHRONICITY: ICD-10-CM

## 2022-03-17 PROCEDURE — 99024 POSTOP FOLLOW-UP VISIT: CPT | Performed by: ORTHOPAEDIC SURGERY

## 2022-03-17 NOTE — LETTER
[address] [place] [address] [address] [place] [address]  Phone: [phone]  Fax: [phone]    Juancarlos Pool MD        March 17, 2022     Patient: Susi Miller   YOB: 1975   Date of Visit: 3/17/2022       To Whom It May Concern: It is my medical opinion that Madelaine Cespedes may return to work on 3/17/22 with the following restrictions: lifting/carrying not to exceed 10 lbs. Table top work only. If you have any questions or concerns, please don't hesitate to call.     Sincerely,    MD Kevin Del Rio MD

## 2022-03-17 NOTE — PROGRESS NOTES
Dr Mirella Odom      Date /Time 3/17/2022       11:46 AM EDT  Name Chayo Ceja             1975   Location  [facility]  MRN [id number]                Chief Complaint   Patient presents with    Follow-up     Left Shoulder Navjot 01/19/2022        History of Present Illness      Chayo Ceja is a 42 y.o. female is here for post-op visit after LEFT  [address]55 Hemiarthroplasty with proximal humeral implant      Patient is 2 months status post hemiarthroplasty left proximal humerus. Patient doing well. Pain controlled. No fever chills    Physical Exam    Based off 1997 Exam Criteria    Ht 4' 8\" (1.422 m)   Wt 148 lb (67.1 kg)   BMI 33.18 kg/m²      Constitutional:       General: He is not in acute distress. Appearance: Normal appearance. LEFT Shoulder: incision clean, intact, healing appropriately. No surrounding  erythema or fluctuance. Neuro intact distal. No evidence of DVT. External rotation 20 degrees and internal rotation L1  Forward flexion 140    Imaging       Left Shoulder: [address] [facility]  Radiographs: X-rays ordered today reviewed the left shoulder. 3 views. AP, scapular Y, and axillary views. They demonstrate a left shoulder hemiarthroplasty in good position. No evidence of loosening or periprosthetic fracture peer        Assessment and Plan    Marquez Burgos was seen today for follow-up. Diagnoses and all orders for this visit:    Status post left shoulder hemiarthroplasty  -     XR SHOULDER LEFT (MIN 2 VIEWS); Future    Left shoulder pain, unspecified chronicity  -     XR SHOULDER LEFT (MIN 2 VIEWS); Future        Patient is doing extremely well. She will continue along the physical therapy protocol. Patient will follow up in 2 months or sooner if problems arise. Electronically signed by Mirella Odom MD on 3/17/2022 at 11:46 AM  This dictation was generated by voice recognition computer software.   Although all attempts are made to edit the dictation for accuracy, there may be errors in the transcription that are not intended.

## 2022-03-21 ENCOUNTER — HOSPITAL ENCOUNTER (OUTPATIENT)
Dept: PHYSICAL THERAPY | Age: 47
Setting detail: THERAPIES SERIES
Discharge: HOME | End: 2022-03-21
Payer: MEDICARE

## 2022-03-21 PROCEDURE — 97140 MANUAL THERAPY 1/> REGIONS: CPT

## 2022-03-21 PROCEDURE — 97110 THERAPEUTIC EXERCISES: CPT

## 2022-03-21 PROCEDURE — 97112 NEUROMUSCULAR REEDUCATION: CPT

## 2022-03-21 NOTE — FLOWSHEET NOTE
[facility] [facility], [address] [place] [address], [address] [place] [address] [address]  Phone: [phone]   Fax:     [phone]      Physical Therapy Treatment Note/ Progress Report:           Date:  3/21/2022    Patient Name:  Haley Snow    :  1975  MRN: [id number]  Restrictions/Precautions:    Medical/Treatment Diagnosis Information:  · Diagnosis: M25.512 (ICD-10-CM) - Left shoulder pain, unspecified chronicity; Z96.612 (ICD-10-CM) - Status post left shoulder hemiarthroplasty  · Treatment Diagnosis: Left shoulder pain, decreased ROm, strength  Insurance/Certification information:  PT Insurance Information: Medicare/medicaid  Physician Information:  Referring Practitioner: Kemi Corbett MD  Has the plan of care been signed (Y/N):        []  Yes  [x]  No     Date of Patient follow up with Physician:     Assessment Summary: Ekaterina Shepherd is a 42 y.o. female reporting to OP PT s/p left TSA on 22. Pt is noted to be doing rather well. ROM and strength testing deferred. Will progress per protocol. Is this a Progress Report:     []  Yes  [x]  No        If Yes:  Date Range for reporting period:  Beginnin22  Ending:   3/14/22    Progress report will be due (10 Rx or 30 days whichever is less): 13       Recertification will be due (POC Duration  / 90 days whichever is less): 22          Visit # Insurance Allowable Auth Required   In [facility] []  Yes     []  No    Tele Health   []  Yes     []  No    Total 8         Functional Scale: Quick Dash 84% , 30%  Date assessed:     Latex Allergy:  [x]NO      []YES  Preferred Language for Healthcare:   [x]English       []other:      Pain level:  0/10         SUBJECTIVE:  Pt states shoulder feels good.      OBJECTIVE:     PROM:  Flexion: 140  Abduction:  ER: 40  IR:    6 weeks - 3/2/22  8 weeks - 3/16   10 weeks -   12 weeks -               RESTRICTIONS/PRECAUTIONS: No passive ER past 20°/flexion past 120°, No active internal rotation, No pendulums    Exercises/Interventions: HEP code: [id number]  Therapeutic Ex (23993) HEP 3/8/22 3/10/22 3/15/22 3/21/22   Warm-up        Pulleys  x30 x30 x30 x30   UBE    2'/2', Lv 1 2'/2', Lv 4           TABLE        Supine cane flexion        Supine punch ups  10x3, 2# X20, 3# X20, 3#    Supine concentric circles  x30 ea x30 ea, 2# x30 ea, 3# x30 ea, 3#   SL ER x  10x2, 2# 10x2, 3#    SL Abduction   10x2 10x2, 1# 10x2, 2#                                   SEATED        Table slides x       Scap squeeze x       Gripping x       PROM cane ER to neutral x       Elbow flexion x 10x2, 2# X30, 2#  X30, 4#           STANDING        [place]  X30, BTB X30, BTB X30, BTB X30, BlkTB   Extension x X20, GTB X20, GTB X20, GTB X20, GTB   Rows x X30, BTB X30, BTB X20, BlkTB    Shrug x X20, 5# X30, 5# X30, 6#    Wall ball circles   x10 ea x10ea    Saw ball  10x2 10x2 x20 10x2   Wall slides x 10x2 10x2 --    IR ball behind back     15x2   ER ball behind head     15x2     Manual: PROM shoulder flexion to 140°, ER to 50°, elbow flex/ext 10'         Therapeutic Exercise and NMR EXR  [x] (29488) Provided verbal/tactile cueing for activities related to strengthening, flexibility, endurance, ROM  for improvements in scapular, scapulothoracic and UE control with self care, reaching, carrying, lifting, house/yardwork, driving/computer work.    [] (36230) Provided verbal/tactile cueing for activities related to improving balance, coordination, kinesthetic sense, posture, motor skill, proprioception  to assist with  scapular, scapulothoracic and UE control with self care, reaching, carrying, lifting, house/yardwork, driving/computer work.     Therapeutic Activities:    [x] (78331 or 76025) Provided verbal/tactile cueing for activities related to improving balance, coordination, kinesthetic sense, posture, motor skill, proprioception and motor activation to allow for proper function of scapular, scapulothoracic and tolerance, in order to prevent re-injury. []? Progressing: [x]? Met: []? Not Met: []? Adjusted      2. Patient will have a decrease in pain to facilitate improvement in movement, function, and ADLs as indicated by Functional Deficits. []? Progressing: [x]? Met: []? Not Met: []? Adjusted      Long Term Goals: To be achieved in: 16 weeks  1. Disability index score of 40 % or less for the DASH to assist with reaching prior level of function. []? Progressing: []? Met: []? Not Met: []? Adjusted      2. Patient will demonstrate increased AROM flex/abd/ER/IR to 140/140/50/L1 to allow for proper joint functioning as indicated by patients Functional Deficits. []? Progressing: []? Met: []? Not Met: []? Adjusted      3. Patient will demonstrate an increase in Strength of shoulder to 5/5 to allow for proper functional mobility as indicated by patients Functional Deficits. []? Progressing: []? Met: []? Not Met: []? Adjusted      4. Patient will return to functional activities without increased symptoms or restriction. []? Progressing: []? Met: []? Not Met: []? Adjusted      5. Pt will return to bowling. (patient specific functional goal)    []? Progressing: []? Met: []? Not Met: []? Adjusted              ASSESSMENT:  Lima Bailey did well with session and progressions. Updated HEP to include more dynamic motions. Patient received education on their current pathology and how their condition effects them with their functional activities. Patient understood discussion and questions were answered. Patient understands their activity limitations and understands rational for treatment progression. Pt educated on plan of care and HEP, if worsening symptoms to d/c that exercise.            PLAN: See eval  [x] Continue per plan of care [] Alter current plan (see comments above)  [] Plan of care initiated [] Hold pending MD visit [] Discharge      Electronically signed by:  Augusto Benitez PT    Note: If patient does not return for scheduled/ recommended follow up visits, this note will serve as a discharge from care along with most recent update on progress.

## 2022-03-23 ENCOUNTER — HOSPITAL ENCOUNTER (OUTPATIENT)
Dept: PHYSICAL THERAPY | Age: 47
Setting detail: THERAPIES SERIES
Discharge: HOME | End: 2022-03-23
Payer: MEDICARE

## 2022-03-23 PROCEDURE — 97112 NEUROMUSCULAR REEDUCATION: CPT

## 2022-03-23 PROCEDURE — 97110 THERAPEUTIC EXERCISES: CPT

## 2022-03-23 PROCEDURE — 97140 MANUAL THERAPY 1/> REGIONS: CPT

## 2022-03-23 NOTE — FLOWSHEET NOTE
723 [facility], [address] [place] [address], [address] [place] [address] [address]  Phone: [phone]   Fax:     [phone]      Physical Therapy Treatment Note/ Progress Report:           Date:  3/23/2022    Patient Name:  Haley Snow    :  1975  MRN: [id number]  Restrictions/Precautions:    Medical/Treatment Diagnosis Information:  · Diagnosis: M25.512 (ICD-10-CM) - Left shoulder pain, unspecified chronicity; Z96.612 (ICD-10-CM) - Status post left shoulder hemiarthroplasty  · Treatment Diagnosis: Left shoulder pain, decreased ROm, strength  Insurance/Certification information:  PT Insurance Information: Medicare/medicaid  Physician Information:  Referring Practitioner: Kemi Corbett MD  Has the plan of care been signed (Y/N):        []  Yes  [x]  No     Date of Patient follow up with Physician:     Assessment Summary: Ekaterina Shepherd is a 42 y.o. female reporting to OP PT s/p left TSA on 22. Pt is noted to be doing rather well. ROM and strength testing deferred. Will progress per protocol. Is this a Progress Report:     []  Yes  [x]  No        If Yes:  Date Range for reporting period:  Beginnin22  Ending:   3/14/22    Progress report will be due (10 Rx or 30 days whichever is less):        Recertification will be due (POC Duration  / 90 days whichever is less): 22          Visit # Insurance Allowable Auth Required   In Alfredo Mario 17 []  Yes     []  No    Tele Health   []  Yes     []  No    Total 9         Functional Scale: Quick Dash 84% , 30%  Date assessed:     Latex Allergy:  [x]NO      []YES  Preferred Language for Healthcare:   [x]English       []other:      Pain level:  0/10         SUBJECTIVE:  Pt states shoulder feels ggod.      OBJECTIVE:     PROM:  Flexion: 140  Abduction:  ER: 40  IR:    6 weeks - 3/2/22  8 weeks - 3/16   10 weeks -   12 weeks -               RESTRICTIONS/PRECAUTIONS: No passive ER past 20°/flexion past 120°, No active internal rotation, No pendulums    Exercises/Interventions: HEP code: [id number]  Therapeutic Ex (48044) HEP 3/15/22 3/21/22 3/23/22    Warm-up        Pulleys  x30 x30 x30    UBE  2'/2', Lv 1 2'/2', Lv 4 2'/2', Lv 4            TABLE        Supine cane flexion        Supine punch ups  X20, 3#  X20, 4#    Supine concentric circles  x30 ea, 3# x30 ea, 3# x30 ea, 3#    SL ER x 10x2, 3#  --    SL Abduction  10x2, 1# 10x2, 2# X20, 2#                            STANDING        Elbow flexion x   X30, 4#    [place]  X30, BTB X30, BlkTB     Extension x X20, GTB X20, GTB X20, GTB    Rows x X20, BlkTB  X30, BlkTB    ER    X30, RTB    IR    X20, OTB    Shrug x X30, 6#  X30, 8#    Wall ball circles  x10ea  10x3, 8#    Saw ball  x20 10x2 10x2    Wall slides x --  --    IR ball behind back   15x2 x15 ea, 2#    ER ball behind head   15x2 x10 ea, 2#                              Manual: PROM shoulder flexion to 140°, ER to 50°, elbow flex/ext 8'        Therapeutic Exercise and NMR EXR  [x] (38147) Provided verbal/tactile cueing for activities related to strengthening, flexibility, endurance, ROM  for improvements in scapular, scapulothoracic and UE control with self care, reaching, carrying, lifting, house/yardwork, driving/computer work.    [] (43803) Provided verbal/tactile cueing for activities related to improving balance, coordination, kinesthetic sense, posture, motor skill, proprioception  to assist with  scapular, scapulothoracic and UE control with self care, reaching, carrying, lifting, house/yardwork, driving/computer work. Therapeutic Activities:    [x] (04135 or 81107) Provided verbal/tactile cueing for activities related to improving balance, coordination, kinesthetic sense, posture, motor skill, proprioception and motor activation to allow for proper function of scapular, scapulothoracic and UE control with self care, carrying, lifting, driving/computer work.      Home Exercise Program:    [x] (95566) Reviewed/Progressed HEP activities related to strengthening, flexibility, endurance, ROM of scapular, scapulothoracic and UE control with self care, reaching, carrying, lifting, house/yardwork, driving/computer work  [] (93319) Reviewed/Progressed HEP activities related to improving balance, coordination, kinesthetic sense, posture, motor skill, proprioception of scapular, scapulothoracic and UE control with self care, reaching, carrying, lifting, house/yardwork, driving/computer work      Manual Treatments:  PROM / STM / Oscillations-Mobs:  G-I, II, III, IV (PA's, Inf., Post.)  [x] (41809) Provided manual therapy to mobilize soft tissue/joints of cervical/CT, scapular GHJ and UE for the purpose of modulating pain, promoting relaxation,  increasing ROM, reducing/eliminating soft tissue swelling/inflammation/restriction, improving soft tissue extensibility and allowing for proper ROM for normal function with self care, reaching, carrying, lifting, house/yardwork, driving/computer work    Modalities:     [] GAME READY (VASO)- for significant edema, swelling, pain control. Charges:  Timed Code Treatment Minutes: 45   Total Treatment Minutes:  45   BWC:  TE TIME:  NMR TIME:  MANUAL TIME:  TA  UNTIMED MINUTES:  Medicare Total:   24  13  8            [] EVAL (LOW) 14720 (typically 20 minutes face-to-face)  [] EVAL (MOD) 11058 (typically 30 minutes face-to-face)  [] EVAL (HIGH) 15703 (typically 45 minutes face-to-face)  [] RE-EVAL     [x] THOMAS(82893) 1     [] IONTO  [x] NMR (26592) 1     [] VASO  [x] Manual (74926) 1     [] Other:  [] TA x      [] Mech Traction (81073)  [] ES(attended) (02047)      [] ES (un) (88039):           GOALS:     Patient stated goal: Improve motion, bowl     Therapist goals for Patient:   Short Term Goals: To be achieved in: 2 weeks  1. Independent in HEP and progression per patient tolerance, in order to prevent re-injury. []? Progressing: [x]? Met: []? Not Met: []? Adjusted      2.  Patient will have a decrease in pain to facilitate improvement in movement, function, and ADLs as indicated by Functional Deficits. []? Progressing: [x]? Met: []? Not Met: []? Adjusted      Long Term Goals: To be achieved in: 16 weeks  1. Disability index score of 40 % or less for the DASH to assist with reaching prior level of function. []? Progressing: []? Met: []? Not Met: []? Adjusted      2. Patient will demonstrate increased AROM flex/abd/ER/IR to 140/140/50/L1 to allow for proper joint functioning as indicated by patients Functional Deficits. []? Progressing: []? Met: []? Not Met: []? Adjusted      3. Patient will demonstrate an increase in Strength of shoulder to 5/5 to allow for proper functional mobility as indicated by patients Functional Deficits. []? Progressing: []? Met: []? Not Met: []? Adjusted      4. Patient will return to functional activities without increased symptoms or restriction. []? Progressing: []? Met: []? Not Met: []? Adjusted      5. Pt will return to bowling. (patient specific functional goal)    []? Progressing: []? Met: []? Not Met: []? Adjusted              ASSESSMENT:  Margie Joseph did well with session. Strength and endurance continue to improve. Patient received education on their current pathology and how their condition effects them with their functional activities. Patient understood discussion and questions were answered. Patient understands their activity limitations and understands rational for treatment progression. Pt educated on plan of care and HEP, if worsening symptoms to d/c that exercise.            PLAN: See eval  [x] Continue per plan of care [] Alter current plan (see comments above)  [] Plan of care initiated [] Hold pending MD visit [] Discharge      Electronically signed by:  Virginia Juan PT    Note: If patient does not return for scheduled/ recommended follow up visits, this note will serve as a discharge from care along with most recent update on progress.

## 2022-03-28 ENCOUNTER — HOSPITAL ENCOUNTER (OUTPATIENT)
Dept: PHYSICAL THERAPY | Age: 47
Setting detail: THERAPIES SERIES
Discharge: HOME | End: 2022-03-28
Payer: MEDICARE

## 2022-03-28 PROCEDURE — 97112 NEUROMUSCULAR REEDUCATION: CPT

## 2022-03-28 PROCEDURE — 97110 THERAPEUTIC EXERCISES: CPT

## 2022-03-28 NOTE — FLOWSHEET NOTE
723 [facility], [facility], [address] [facility] [address]  Phone: [phone]   Fax:     [phone]      Physical Therapy Treatment Note/ Progress Report:           Date:  3/28/2022    Patient Name:  Esthela Postal    :  1975  MRN: [id number]  Restrictions/Precautions:    Medical/Treatment Diagnosis Information:  · Diagnosis: M25.512 (ICD-10-CM) - Left shoulder pain, unspecified chronicity; Z96.612 (ICD-10-CM) - Status post left shoulder hemiarthroplasty  · Treatment Diagnosis: Left shoulder pain, decreased ROm, strength  Insurance/Certification information:  PT Insurance Information: Medicare/medicaid  Physician Information:  Referring Practitioner: Quiana Girard MD  Has the plan of care been signed (Y/N):        []  Yes  [x]  No     Date of Patient follow up with Physician:     Assessment Summary: Sourav is a 42 y.o. female reporting to OP PT s/p left TSA on 22. Pt is noted to be doing rather well. ROM and strength testing deferred. Will progress per protocol. Is this a Progress Report:     []  Yes  [x]  No        If Yes:  Date Range for reporting period:  Beginnin22  Ending:   3/14/22    Progress report will be due (10 Rx or 30 days whichever is less): 37       Recertification will be due (POC Duration  / 90 days whichever is less): 22          Visit # Insurance Allowable Auth Required   In Alfredo Mario 17 []  Yes     []  No    Tele Health   []  Yes     []  No    Total 10         Functional Scale: Quick Dash 84% , 30%  Date assessed:     Latex Allergy:  [x]NO      []YES  Preferred Language for Healthcare:   [x]English       []other:      Pain level:  0/10         SUBJECTIVE:  Pt states shoulder feels good.          OBJECTIVE:     AROM:  Flexion: 150  Abduction:  ER: 40  IR:    6 weeks - 3/2/22  8 weeks - 3/16   10 weeks -   12 weeks -               RESTRICTIONS/PRECAUTIONS: No passive ER past 20°/flexion past 120°, No active internal rotation, No pendulums    Exercises/Interventions: HEP code: [id number]  Therapeutic Ex (18253) HEP 3/21/22 3/23/22 3/28/22   Warm-up       Pulleys  x30 x30 x30   UBE  2'/2', Lv 4 2'/2', Lv 4 2'/2', Lv 4          TABLE       Supine cane flexion       Supine punch ups   X20, 4#    Supine concentric circles  x30 ea, 3# x30 ea, 3#    SL ER x  --    SL Abduction  10x2, 2# X20, 2# X20, 2#   SL Concentric circles    x20 ea, 2#                 STANDING       Elbow flexion x  X30, 4# X30, 4#   [place]  X30, BlkTB  X30, BTB   Extension x X20, GTB X20, GTB X30, GTB   Rows x  X30, BlkTB X30, BLKTB   ER   X30, RTB X20, GTB   IR   X20, OTB X20, GTB   Shrug x  X30, 8# X30, 8#   Wall ball circles   10x3, 8#    Saw ball  10x2 10x2    Wall slides x  -- 10x2, 2#   IR ball behind back  15x2 x15 ea, 2#    ER ball behind head  15x2 x10 ea, 2#    Scaption    X15, 1#                   Manual: PROM shoulder flexion to 140°, ER to 50°, elbow flex/ext 4'        Therapeutic Exercise and NMR EXR  [x] (07179) Provided verbal/tactile cueing for activities related to strengthening, flexibility, endurance, ROM  for improvements in scapular, scapulothoracic and UE control with self care, reaching, carrying, lifting, house/yardwork, driving/computer work.    [] (45485) Provided verbal/tactile cueing for activities related to improving balance, coordination, kinesthetic sense, posture, motor skill, proprioception  to assist with  scapular, scapulothoracic and UE control with self care, reaching, carrying, lifting, house/yardwork, driving/computer work. Therapeutic Activities:    [x] (44540 or 49755) Provided verbal/tactile cueing for activities related to improving balance, coordination, kinesthetic sense, posture, motor skill, proprioception and motor activation to allow for proper function of scapular, scapulothoracic and UE control with self care, carrying, lifting, driving/computer work.      Home Exercise Program:    [x] (23407) Reviewed/Progressed HEP activities related to strengthening, flexibility, endurance, ROM of scapular, scapulothoracic and UE control with self care, reaching, carrying, lifting, house/yardwork, driving/computer work  [] (22178) Reviewed/Progressed HEP activities related to improving balance, coordination, kinesthetic sense, posture, motor skill, proprioception of scapular, scapulothoracic and UE control with self care, reaching, carrying, lifting, house/yardwork, driving/computer work      Manual Treatments:  PROM / STM / Oscillations-Mobs:  G-I, II, III, IV (PA's, Inf., Post.)  [x] (40192) Provided manual therapy to mobilize soft tissue/joints of cervical/CT, scapular GHJ and UE for the purpose of modulating pain, promoting relaxation,  increasing ROM, reducing/eliminating soft tissue swelling/inflammation/restriction, improving soft tissue extensibility and allowing for proper ROM for normal function with self care, reaching, carrying, lifting, house/yardwork, driving/computer work    Modalities:     [] GAME READY (VASO)- for significant edema, swelling, pain control. Charges:  Timed Code Treatment Minutes: 40   Total Treatment Minutes:  40   BWC:  TE TIME:  NMR TIME:  MANUAL TIME:  TA  UNTIMED MINUTES:  Medicare Total:   25  15              [] EVAL (LOW) 97483 (typically 20 minutes face-to-face)  [] EVAL (MOD) 15022 (typically 30 minutes face-to-face)  [] EVAL (HIGH) 62122 (typically 45 minutes face-to-face)  [] RE-EVAL     [x] ZN(65089) 2     [] IONTO  [x] NMR (34579) 1     [] VASO  [] Manual (09230) 1     [] Other:  [] TA x      [] Mech Traction (48859)  [] ES(attended) (05630)      [] ES (un) (96069):           GOALS:     Patient stated goal: Improve motion, bowl     Therapist goals for Patient:   Short Term Goals: To be achieved in: 2 weeks  1. Independent in HEP and progression per patient tolerance, in order to prevent re-injury. []? Progressing: [x]? Met: []? Not Met: []? Adjusted      2. Patient will have a decrease in pain to facilitate improvement in movement, function, and ADLs as indicated by Functional Deficits. []? Progressing: [x]? Met: []? Not Met: []? Adjusted      Long Term Goals: To be achieved in: 16 weeks  1. Disability index score of 40 % or less for the DASH to assist with reaching prior level of function. []? Progressing: []? Met: []? Not Met: []? Adjusted      2. Patient will demonstrate increased AROM flex/abd/ER/IR to 140/140/50/L1 to allow for proper joint functioning as indicated by patients Functional Deficits. []? Progressing: []? Met: []? Not Met: []? Adjusted      3. Patient will demonstrate an increase in Strength of shoulder to 5/5 to allow for proper functional mobility as indicated by patients Functional Deficits. []? Progressing: []? Met: []? Not Met: []? Adjusted      4. Patient will return to functional activities without increased symptoms or restriction. []? Progressing: []? Met: []? Not Met: []? Adjusted      5. Pt will return to bowling. (patient specific functional goal)    []? Progressing: []? Met: []? Not Met: []? Adjusted              ASSESSMENT:  Aaron Whittington did well with session. Overall continues to do well. No issues. Patient received education on their current pathology and how their condition effects them with their functional activities. Patient understood discussion and questions were answered. Patient understands their activity limitations and understands rational for treatment progression. Pt educated on plan of care and HEP, if worsening symptoms to d/c that exercise.            PLAN: See eval  [x] Continue per plan of care [] Alter current plan (see comments above)  [] Plan of care initiated [] Hold pending MD visit [] Discharge      Electronically signed by:  Benjamin Reddy PT    Note: If patient does not return for scheduled/ recommended follow up visits, this note will serve as a discharge from care along with most recent update on progress.

## 2022-03-30 ENCOUNTER — HOSPITAL ENCOUNTER (OUTPATIENT)
Dept: PHYSICAL THERAPY | Age: 47
Setting detail: THERAPIES SERIES
Discharge: HOME | End: 2022-03-30
Payer: MEDICARE

## 2022-03-30 PROCEDURE — 97110 THERAPEUTIC EXERCISES: CPT

## 2022-03-30 PROCEDURE — 97112 NEUROMUSCULAR REEDUCATION: CPT

## 2022-03-30 NOTE — FLOWSHEET NOTE
723 [facility], [address] [place] [address], [address] [place] [address] [address]  Phone: [phone]   Fax:     [phone]      Physical Therapy Treatment Note/ Progress Report:           Date:  3/30/2022    Patient Name:  Evelyn Andres    :  1975  MRN: [id number]  Restrictions/Precautions:    Medical/Treatment Diagnosis Information:  · Diagnosis: M25.512 (ICD-10-CM) - Left shoulder pain, unspecified chronicity; Z96.612 (ICD-10-CM) - Status post left shoulder hemiarthroplasty  · Treatment Diagnosis: Left shoulder pain, decreased ROm, strength  Insurance/Certification information:  PT Insurance Information: Medicare/medicaid  Physician Information:  Referring Practitioner: Alex Chan MD  Has the plan of care been signed (Y/N):        []  Yes  [x]  No     Date of Patient follow up with Physician:     Assessment Summary: Sarah Beth Nelson is a 42 y.o. female reporting to OP PT s/p left TSA on 22. Pt is noted to be doing rather well. ROM and strength testing deferred. Will progress per protocol. Is this a Progress Report:     []  Yes  [x]  No        If Yes:  Date Range for reporting period:  Beginnin22  Ending:   3/14/22    Progress report will be due (10 Rx or 30 days whichever is less):        Recertification will be due (POC Duration  / 90 days whichever is less): 22          Visit # Insurance Allowable Auth Required   In Alfredo Mario 17 []  Yes     []  No    Tele Health   []  Yes     []  No    Total 11         Functional Scale: Quick Dash 84% , 30%  Date assessed:     Latex Allergy:  [x]NO      []YES  Preferred Language for Healthcare:   [x]English       []other:      Pain level:  0/10         SUBJECTIVE:  Pt states shoulder feels good.          OBJECTIVE:     AROM:  Flexion: 150  Abduction: 145  ER: 40  IR:    6 weeks - 3/2/22  8 weeks - 3/16   10 weeks -   12 weeks -               RESTRICTIONS/PRECAUTIONS: No passive ER past 20°/flexion past 120°, No active internal rotation, No pendulums    Exercises/Interventions: HEP code: [id number]  Therapeutic Ex (92886) HEP 3/23/22 3/28/22 3/30/22    Warm-up        Pulleys  x30 x30     UBE  2'/2', Lv 4 2'/2', Lv 4 2'/2', Lv 5            TABLE        Supine cane flexion        Supine punch ups  X20, 4#      Supine concentric circles  x30 ea, 3#  x30 ea, 3#    SL ER x --  --    SL Abduction  X20, 2# X20, 2# 10x2, 3#    SL Concentric circles   x20 ea, 2# x20 ea, 3#                    STANDING        Elbow flexion x X30, 4# X30, 4# X30, 5#    [place]   X30, BTB --    Extension x X20, GTB X30, GTB X30, GTB    Rows x X30, BlkTB X30, BLKTB X30, BlkTB    ER  X30, RTB X20, GTB X20, GTB    IR  X20, OTB X20, GTB X30, BTB    Shrug x X30, 8# X30, 8# X30, 8#    Wall ball circles  10x3, 8#  x10 ea, 2#    Saw ball  10x2      Wall slides x -- 10x2, 2#     IR ball behind back  x15 ea, 2#  x15 ea, 2#    ER ball behind head  x10 ea, 2#  x15 ea, 2#    Scaption   X15, 1# 10x2, 2#                      Manual: PROM shoulder flexion to 140°, ER to 50°, elbow flex/ext 8'        Therapeutic Exercise and NMR EXR  [x] (44628) Provided verbal/tactile cueing for activities related to strengthening, flexibility, endurance, ROM  for improvements in scapular, scapulothoracic and UE control with self care, reaching, carrying, lifting, house/yardwork, driving/computer work.    [] (58524) Provided verbal/tactile cueing for activities related to improving balance, coordination, kinesthetic sense, posture, motor skill, proprioception  to assist with  scapular, scapulothoracic and UE control with self care, reaching, carrying, lifting, house/yardwork, driving/computer work.     Therapeutic Activities:    [x] (77206 or 39349) Provided verbal/tactile cueing for activities related to improving balance, coordination, kinesthetic sense, posture, motor skill, proprioception and motor activation to allow for proper function of scapular, scapulothoracic and UE control with self care, carrying, lifting, driving/computer work. Home Exercise Program:    [x] (16455) Reviewed/Progressed HEP activities related to strengthening, flexibility, endurance, ROM of scapular, scapulothoracic and UE control with self care, reaching, carrying, lifting, house/yardwork, driving/computer work  [] (60666) Reviewed/Progressed HEP activities related to improving balance, coordination, kinesthetic sense, posture, motor skill, proprioception of scapular, scapulothoracic and UE control with self care, reaching, carrying, lifting, house/yardwork, driving/computer work      Manual Treatments:  PROM / STM / Oscillations-Mobs:  G-I, II, III, IV (PA's, Inf., Post.)  [x] (59617) Provided manual therapy to mobilize soft tissue/joints of cervical/CT, scapular GHJ and UE for the purpose of modulating pain, promoting relaxation,  increasing ROM, reducing/eliminating soft tissue swelling/inflammation/restriction, improving soft tissue extensibility and allowing for proper ROM for normal function with self care, reaching, carrying, lifting, house/yardwork, driving/computer work    Modalities:     [] GAME READY (VASO)- for significant edema, swelling, pain control. Charges:  Timed Code Treatment Minutes: 40   Total Treatment Minutes:  40   BWC:  TE TIME:  NMR TIME:  MANUAL TIME:  TA  UNTIMED MINUTES:  Medicare Total:   25  15              [] EVAL (LOW) 33930 (typically 20 minutes face-to-face)  [] EVAL (MOD) 46388 (typically 30 minutes face-to-face)  [] EVAL (HIGH) 46284 (typically 45 minutes face-to-face)  [] RE-EVAL     [x] MU(10004) 2     [] IONTO  [x] NMR (80059) 1     [] VASO  [] Manual (06135) 1     [] Other:  [] TA x      [] Mech Traction (77700)  [] ES(attended) (50816)      [] ES (un) (18243):           GOALS:     Patient stated goal: Improve motion, bowl     Therapist goals for Patient:   Short Term Goals: To be achieved in: 2 weeks  1.  Independent in HEP and progression per patient tolerance, in order to prevent re-injury. []? Progressing: [x]? Met: []? Not Met: []? Adjusted      2. Patient will have a decrease in pain to facilitate improvement in movement, function, and ADLs as indicated by Functional Deficits. []? Progressing: [x]? Met: []? Not Met: []? Adjusted      Long Term Goals: To be achieved in: 16 weeks  1. Disability index score of 40 % or less for the DASH to assist with reaching prior level of function. []? Progressing: []? Met: []? Not Met: []? Adjusted      2. Patient will demonstrate increased AROM flex/abd/ER/IR to 140/140/50/L1 to allow for proper joint functioning as indicated by patients Functional Deficits. []? Progressing: []? Met: []? Not Met: []? Adjusted      3. Patient will demonstrate an increase in Strength of shoulder to 5/5 to allow for proper functional mobility as indicated by patients Functional Deficits. []? Progressing: []? Met: []? Not Met: []? Adjusted      4. Patient will return to functional activities without increased symptoms or restriction. []? Progressing: []? Met: []? Not Met: []? Adjusted      5. Pt will return to bowling. (patient specific functional goal)    []? Progressing: []? Met: []? Not Met: []? Adjusted              ASSESSMENT:  Aram Estrella did well with session. AROm is very good at thhis point. Mild weakness still remains as shaking noted with some finer more controlled motor tasks. Patient received education on their current pathology and how their condition effects them with their functional activities. Patient understood discussion and questions were answered. Patient understands their activity limitations and understands rational for treatment progression. Pt educated on plan of care and HEP, if worsening symptoms to d/c that exercise.            PLAN: See eval  [x] Continue per plan of care [] Alter current plan (see comments above)  [] Plan of care initiated [] Hold pending MD visit [] Discharge      Electronically signed by:  Dejon Beatty, ENIO    Note: If patient does not return for scheduled/ recommended follow up visits, this note will serve as a discharge from care along with most recent update on progress.

## 2022-04-04 ENCOUNTER — HOSPITAL ENCOUNTER (OUTPATIENT)
Dept: PHYSICAL THERAPY | Age: 47
Setting detail: THERAPIES SERIES
Discharge: HOME | End: 2022-04-04
Payer: MEDICARE

## 2022-04-04 PROCEDURE — 97110 THERAPEUTIC EXERCISES: CPT

## 2022-04-04 PROCEDURE — 97112 NEUROMUSCULAR REEDUCATION: CPT

## 2022-04-04 PROCEDURE — 97140 MANUAL THERAPY 1/> REGIONS: CPT

## 2022-04-04 NOTE — FLOWSHEET NOTE
[address] [facility], [address] [place] [address] [address]  Phone: [phone]   Fax:     [phone]      Physical Therapy Treatment Note/ Progress Report:           Date:  2022    Patient Name:  Duran Montano    :  1975  MRN: [id number]  Restrictions/Precautions:    Medical/Treatment Diagnosis Information:  · Diagnosis: M25.512 (ICD-10-CM) - Left shoulder pain, unspecified chronicity; Z96.612 (ICD-10-CM) - Status post left shoulder hemiarthroplasty  · Treatment Diagnosis: Left shoulder pain, decreased ROm, strength  Insurance/Certification information:  PT Insurance Information: Medicare/medicaid  Physician Information:  Referring Practitioner: Jeremias Terry MD  Has the plan of care been signed (Y/N):        []  Yes  [x]  No     Date of Patient follow up with Physician:     Assessment Summary: Jose Lubin is a 42 y.o. female reporting to OP PT s/p left TSA on 22. Pt is noted to be doing rather well. ROM and strength testing deferred. Will progress per protocol. Is this a Progress Report:     []  Yes  [x]  No        If Yes:  Date Range for reporting period:  Beginnin22  Ending:   3/14/22    Progress report will be due (10 Rx or 30 days whichever is less):        Recertification will be due (POC Duration  / 90 days whichever is less): 22          Visit # Insurance Allowable Auth Required   In [place] 17 []  Yes     []  No    Tele Health   []  Yes     []  No    Total 12         Functional Scale: Quick Dash 84% , 30%  Date assessed:     Latex Allergy:  [x]NO      []YES  Preferred Language for Healthcare:   [x]English       []other:      Pain level:  0/10         SUBJECTIVE:  Pt states shoulder feels good.          OBJECTIVE:     AROM:  Flexion: 150  Abduction: 145  ER: 40  IR:    6 weeks - 3/2/22  8 weeks - 3/16   10 weeks -   12 weeks -               RESTRICTIONS/PRECAUTIONS: No passive ER past 20°/flexion past 120°, No active internal rotation, No pendulums    Exercises/Interventions: HEP code: [id number]  Therapeutic Ex (93175) HEP 3/28/22 3/30/22 4/4/22   Warm-up       Pulleys  x30     UBE  2'/2', Lv 4 2'/2', Lv 5 2'/2', Lv 5          TABLE       Supine cane flexion       Supine punch ups    X20, 4#   Supine concentric circles   x30 ea, 3# x30 ea, 3#   SL ER x  -- --   SL Abduction  X20, 2# 10x2, 3# 10x2, 3#   SL Concentric circles  x20 ea, 2# x20 ea, 3# x20 ea, 3#                 STANDING       Elbow flexion x X30, 4# X30, 5# X30, 5#   [place]  X30, BTB -- X30, BTB   Extension x X30, GTB X30, GTB X30, GTB   Rows x X30, BLKTB X30, BlkTB X30, BlkTB   ER  X20, GTB X20, GTB X20, BTB   IR  X20, GTB X30, BTB X30, BTB   Shrug x X30, 8# X30, 8# X30, 8#   Wall ball circles   x10 ea, 2# 10x2 ea, 2#   Saw ball       Wall slides x 10x2, 2#     IR ball behind back   x15 ea, 2# x15 ea, 2#   ER ball behind head   x15 ea, 2# x15 ea, 2#   Scaption  X15, 1# 10x2, 2# 10x2, 2#                   Manual: PROM shoulder flexion to 140°, ER to 50°, elbow flex/ext 8'         Therapeutic Exercise and NMR EXR  [x] (35621) Provided verbal/tactile cueing for activities related to strengthening, flexibility, endurance, ROM  for improvements in scapular, scapulothoracic and UE control with self care, reaching, carrying, lifting, house/yardwork, driving/computer work.    [] (34346) Provided verbal/tactile cueing for activities related to improving balance, coordination, kinesthetic sense, posture, motor skill, proprioception  to assist with  scapular, scapulothoracic and UE control with self care, reaching, carrying, lifting, house/yardwork, driving/computer work.     Therapeutic Activities:    [x] (79234 or 21722) Provided verbal/tactile cueing for activities related to improving balance, coordination, kinesthetic sense, posture, motor skill, proprioception and motor activation to allow for proper function of scapular, scapulothoracic and UE control with self care, carrying, lifting, driving/computer work. Home Exercise Program:    [x] (53180) Reviewed/Progressed HEP activities related to strengthening, flexibility, endurance, ROM of scapular, scapulothoracic and UE control with self care, reaching, carrying, lifting, house/yardwork, driving/computer work  [] (10369) Reviewed/Progressed HEP activities related to improving balance, coordination, kinesthetic sense, posture, motor skill, proprioception of scapular, scapulothoracic and UE control with self care, reaching, carrying, lifting, house/yardwork, driving/computer work      Manual Treatments:  PROM / STM / Oscillations-Mobs:  G-I, II, III, IV (PA's, Inf., Post.)  [x] (10370) Provided manual therapy to mobilize soft tissue/joints of cervical/CT, scapular GHJ and UE for the purpose of modulating pain, promoting relaxation,  increasing ROM, reducing/eliminating soft tissue swelling/inflammation/restriction, improving soft tissue extensibility and allowing for proper ROM for normal function with self care, reaching, carrying, lifting, house/yardwork, driving/computer work    Modalities:     [] GAME READY (VASO)- for significant edema, swelling, pain control. Charges:  Timed Code Treatment Minutes: 53   Total Treatment Minutes:  53   BWC:  TE TIME:  NMR TIME:  MANUAL TIME:  TA  UNTIMED MINUTES:  Medicare Total:   30  15  8            [] EVAL (LOW) 45946 (typically 20 minutes face-to-face)  [] EVAL (MOD) 91145 (typically 30 minutes face-to-face)  [] EVAL (HIGH) 14961 (typically 45 minutes face-to-face)  [] RE-EVAL     [x] LQ(79049) 2     [] IONTO  [x] NMR (63274) 1     [] VASO  [x] Manual (85880) 1     [] Other:  [] TA x      [] Mech Traction (48170)  [] ES(attended) (64365)      [] ES (un) (74669):           GOALS:     Patient stated goal: Improve motion, bowl     Therapist goals for Patient:   Short Term Goals: To be achieved in: 2 weeks  1.  Independent in HEP and progression per patient tolerance, in order to prevent re-injury. []? Progressing: [x]? Met: []? Not Met: []? Adjusted      2. Patient will have a decrease in pain to facilitate improvement in movement, function, and ADLs as indicated by Functional Deficits. []? Progressing: [x]? Met: []? Not Met: []? Adjusted      Long Term Goals: To be achieved in: 16 weeks  1. Disability index score of 40 % or less for the DASH to assist with reaching prior level of function. []? Progressing: []? Met: []? Not Met: []? Adjusted      2. Patient will demonstrate increased AROM flex/abd/ER/IR to 140/140/50/L1 to allow for proper joint functioning as indicated by patients Functional Deficits. []? Progressing: []? Met: []? Not Met: []? Adjusted      3. Patient will demonstrate an increase in Strength of shoulder to 5/5 to allow for proper functional mobility as indicated by patients Functional Deficits. []? Progressing: []? Met: []? Not Met: []? Adjusted      4. Patient will return to functional activities without increased symptoms or restriction. []? Progressing: []? Met: []? Not Met: []? Adjusted      5. Pt will return to bowling. (patient specific functional goal)    []? Progressing: []? Met: []? Not Met: []? Adjusted              ASSESSMENT:  Sierra Roberson did well with session. Active motions looking very good. Quality of motions looks good. Some decrease in overall endurance. Patient received education on their current pathology and how their condition effects them with their functional activities. Patient understood discussion and questions were answered. Patient understands their activity limitations and understands rational for treatment progression. Pt educated on plan of care and HEP, if worsening symptoms to d/c that exercise.            PLAN: See eval  [x] Continue per plan of care [] Alter current plan (see comments above)  [] Plan of care initiated [] Hold pending MD visit [] Discharge      Electronically signed by:  Mulu Mercado PT    Note: If patient does not return for scheduled/ recommended follow up visits, this note will serve as a discharge from care along with most recent update on progress.

## 2022-04-08 ENCOUNTER — HOSPITAL ENCOUNTER (OUTPATIENT)
Dept: PHYSICAL THERAPY | Age: 47
Setting detail: THERAPIES SERIES
Discharge: HOME | End: 2022-04-08
Payer: MEDICARE

## 2022-04-08 PROCEDURE — 97110 THERAPEUTIC EXERCISES: CPT

## 2022-04-08 PROCEDURE — 97112 NEUROMUSCULAR REEDUCATION: CPT

## 2022-04-08 PROCEDURE — 97140 MANUAL THERAPY 1/> REGIONS: CPT

## 2022-04-08 NOTE — FLOWSHEET NOTE
past 120°, No active internal rotation, No pendulums    Exercises/Interventions: HEP code: [id number]  Therapeutic Ex (11420) HEP 3/30/22 4/4/22 4/8/22    Warm-up        Pulleys        UBE  2'/2', Lv 5 2'/2', Lv 5 2'/2', Lv 5            TABLE        Supine cane flexion        Supine punch ups   X20, 4# X20, 4#    Supine concentric circles  x30 ea, 3# x30 ea, 3# x30 ea, 4#    SL ER x -- --     SL Abduction  10x2, 3# 10x2, 3# X20, 3#    SL Concentric circles  x20 ea, 3# x20 ea, 3# x30 ea, 3#                    STANDING        Elbow flexion x X30, 5# X30, 5# X30, 5#    [place]  -- X30, BTB --    Extension x X30, GTB X30, GTB X30, BlkTB    Rows x X30, BlkTB X30, BlkTB X30, BlkTB    ER  X20, GTB X20, BTB X30, BTB    IR  X30, BTB X30, BTB X30, BTB    Shrug x X30, 8# X30, 8#     Wall ball circles  x10 ea, 2# 10x2 ea, 2#     Saw ball    x20    Wall slides x       IR ball behind back  x15 ea, 2# x15 ea, 2#     ER ball behind head  x15 ea, 2# x15 ea, 2#     Scaption  10x2, 2# 10x2, 2# --    Wall taps w/ ball    X10, 6#              Manual: PROM shoulder flexion to 140°, ER to 50°, elbow flex/ext 8'         Therapeutic Exercise and NMR EXR  [x] (22428) Provided verbal/tactile cueing for activities related to strengthening, flexibility, endurance, ROM  for improvements in scapular, scapulothoracic and UE control with self care, reaching, carrying, lifting, house/yardwork, driving/computer work.    [] (42034) Provided verbal/tactile cueing for activities related to improving balance, coordination, kinesthetic sense, posture, motor skill, proprioception  to assist with  scapular, scapulothoracic and UE control with self care, reaching, carrying, lifting, house/yardwork, driving/computer work.     Therapeutic Activities:    [x] (02954 or 47223) Provided verbal/tactile cueing for activities related to improving balance, coordination, kinesthetic sense, posture, motor skill, proprioception and motor activation to allow for proper function of scapular, scapulothoracic and UE control with self care, carrying, lifting, driving/computer work. Home Exercise Program:    [x] (19094) Reviewed/Progressed HEP activities related to strengthening, flexibility, endurance, ROM of scapular, scapulothoracic and UE control with self care, reaching, carrying, lifting, house/yardwork, driving/computer work  [] (59104) Reviewed/Progressed HEP activities related to improving balance, coordination, kinesthetic sense, posture, motor skill, proprioception of scapular, scapulothoracic and UE control with self care, reaching, carrying, lifting, house/yardwork, driving/computer work      Manual Treatments:  PROM / STM / Oscillations-Mobs:  G-I, II, III, IV (PA's, Inf., Post.)  [x] (41723) Provided manual therapy to mobilize soft tissue/joints of cervical/CT, scapular GHJ and UE for the purpose of modulating pain, promoting relaxation,  increasing ROM, reducing/eliminating soft tissue swelling/inflammation/restriction, improving soft tissue extensibility and allowing for proper ROM for normal function with self care, reaching, carrying, lifting, house/yardwork, driving/computer work    Modalities:     [] GAME READY (VASO)- for significant edema, swelling, pain control. Charges:  Timed Code Treatment Minutes: 45   Total Treatment Minutes:  45   BWC:  TE TIME:  NMR TIME:  MANUAL TIME:  TA  UNTIMED MINUTES:  Medicare Total:   22  15  8            [] EVAL (LOW) 37185 (typically 20 minutes face-to-face)  [] EVAL (MOD) 46412 (typically 30 minutes face-to-face)  [] EVAL (HIGH) 95344 (typically 45 minutes face-to-face)  [] RE-EVAL     [x] VV(58958) 2     [] IONTO  [x] NMR (76691) 1     [] VASO  [x] Manual (03254) 1     [] Other:  [] TA x      [] Mech Traction (09360)  [] ES(attended) (87418)      [] ES (un) (20198):           GOALS:     Patient stated goal: Improve motion, bowl     Therapist goals for Patient:   Short Term Goals: To be achieved in: 2 weeks  1. Independent in HEP and progression per patient tolerance, in order to prevent re-injury. []? Progressing: [x]? Met: []? Not Met: []? Adjusted      2. Patient will have a decrease in pain to facilitate improvement in movement, function, and ADLs as indicated by Functional Deficits. []? Progressing: [x]? Met: []? Not Met: []? Adjusted      Long Term Goals: To be achieved in: 16 weeks  1. Disability index score of 40 % or less for the DASH to assist with reaching prior level of function. []? Progressing: []? Met: []? Not Met: []? Adjusted      2. Patient will demonstrate increased AROM flex/abd/ER/IR to 140/140/50/L1 to allow for proper joint functioning as indicated by patients Functional Deficits. []? Progressing: []? Met: []? Not Met: []? Adjusted      3. Patient will demonstrate an increase in Strength of shoulder to 5/5 to allow for proper functional mobility as indicated by patients Functional Deficits. []? Progressing: []? Met: []? Not Met: []? Adjusted      4. Patient will return to functional activities without increased symptoms or restriction. []? Progressing: []? Met: []? Not Met: []? Adjusted      5. Pt will return to bowling. (patient specific functional goal)    []? Progressing: []? Met: []? Not Met: []? Adjusted              ASSESSMENT:  Martina Patterson did well with session. Active motions looking very good. Quality of motions looks good. Some decrease in overall endurance. Patient received education on their current pathology and how their condition effects them with their functional activities. Patient understood discussion and questions were answered. Patient understands their activity limitations and understands rational for treatment progression. Pt educated on plan of care and HEP, if worsening symptoms to d/c that exercise.            PLAN: See eval  [x] Continue per plan of care [] Alter current plan (see comments above)  [] Plan of care initiated [] Hold pending MD visit [] Discharge      Electronically signed by:  Rosa M Smith PT    Note: If patient does not return for scheduled/ recommended follow up visits, this note will serve as a discharge from care along with most recent update on progress.

## 2022-04-11 ENCOUNTER — HOSPITAL ENCOUNTER (OUTPATIENT)
Dept: PHYSICAL THERAPY | Age: 47
Setting detail: THERAPIES SERIES
Discharge: HOME | End: 2022-04-11
Payer: MEDICARE

## 2022-04-11 PROCEDURE — 97110 THERAPEUTIC EXERCISES: CPT

## 2022-04-11 PROCEDURE — 97112 NEUROMUSCULAR REEDUCATION: CPT

## 2022-04-11 NOTE — FLOWSHEET NOTE
[address] [facility], [address] [place] [address], [address] [place] [address] [address]  Phone: [phone]   Fax:     [phone]      Physical Therapy Treatment Note/ Progress Report:           Date:  2022    Patient Name:  Horace Sen    :  1975  MRN: [id number]  Restrictions/Precautions:    Medical/Treatment Diagnosis Information:  · Diagnosis: M25.512 (ICD-10-CM) - Left shoulder pain, unspecified chronicity; Z96.612 (ICD-10-CM) - Status post left shoulder hemiarthroplasty  · Treatment Diagnosis: Left shoulder pain, decreased ROm, strength  Insurance/Certification information:  PT Insurance Information: Medicare/medicaid  Physician Information:  Referring Practitioner: Heidi Muro MD  Has the plan of care been signed (Y/N):        []  Yes  [x]  No     Date of Patient follow up with Physician:     Assessment Summary: Karine Lopez is a 42 y.o. female reporting to OP PT s/p left TSA on 22. Pt is noted to be doing rather well. ROM and strength testing deferred. Will progress per protocol. Is this a Progress Report:     []  Yes  [x]  No        If Yes:  Date Range for reporting period:  Beginnin22  Ending:   3/14/22    Progress report will be due (10 Rx or 30 days whichever is less):        Recertification will be due (POC Duration  / 90 days whichever is less): 22          Visit # Insurance Allowable Auth Required   In [facility] 17 []  Yes     []  No    Tele Health   []  Yes     []  No    Total 14         Functional Scale: Quick Dash 84% , 30%, 2%  Date assessed:     Latex Allergy:  [x]NO      []YES  Preferred Language for Healthcare:   [x]English       []other:      Pain level:  0/10         SUBJECTIVE:  Pt states shoulder feels good.           OBJECTIVE:     AROM:  Flexion: 150  Abduction: 155  ER: 40  IR: L1    MMT:  Flexion: 5/5  Abduction: 5/5  ER: 5/5   IR: 5/5      6 weeks - 3/2/22  8 weeks - 3/16   10 weeks -   12 weeks - RESTRICTIONS/PRECAUTIONS: No passive ER past 20°/flexion past 120°, No active internal rotation, No pendulums    Exercises/Interventions: HEP code: [id number]  Therapeutic Ex (61878) HEP 3/30/22 4/4/22 4/11/22    Warm-up        Pulleys        UBE  2'/2', Lv 5 2'/2', Lv 5 2'/2', Lv 5            TABLE        Supine cane flexion        Supine punch ups   X20, 4# X20, 4#    Supine concentric circles  x30 ea, 3# x30 ea, 3# x30 ea, 4#    SL ER x -- --     SL Abduction  10x2, 3# 10x2, 3# X20, 3#    SL Concentric circles  x20 ea, 3# x20 ea, 3# x30 ea, 3#                    STANDING        Elbow flexion x X30, 5# X30, 5# X30, 5#    [place]  -- X30, BTB --    Extension x X30, GTB X30, GTB X30, BlkTB    Rows x X30, BlkTB X30, BlkTB X30, BlkTB    ER  X20, GTB X20, BTB X30, BTB    IR  X30, BTB X30, BTB X30, BTB    Shrug x X30, 8# X30, 8#     Wall ball circles  x10 ea, 2# 10x2 ea, 2# x20 ea, 2#    Saw ball    x20    Wall slides x       IR ball behind back  x15 ea, 2# x15 ea, 2# x10 ea, 2#    ER ball behind head  x15 ea, 2# x15 ea, 2# x10 ea, 2#    [place]  10x2, 2# 10x2, 2# 10x2, 2#    Wall taps w/ ball    X10, 6#              Manual:         Therapeutic Exercise and NMR EXR  [x] (08631) Provided verbal/tactile cueing for activities related to strengthening, flexibility, endurance, ROM  for improvements in scapular, scapulothoracic and UE control with self care, reaching, carrying, lifting, house/yardwork, driving/computer work.    [] (73296) Provided verbal/tactile cueing for activities related to improving balance, coordination, kinesthetic sense, posture, motor skill, proprioception  to assist with  scapular, scapulothoracic and UE control with self care, reaching, carrying, lifting, house/yardwork, driving/computer work.     Therapeutic Activities:    [x] (93675 or 27665) Provided verbal/tactile cueing for activities related to improving balance, coordination, kinesthetic sense, posture, motor skill, proprioception and motor activation to allow for proper function of scapular, scapulothoracic and UE control with self care, carrying, lifting, driving/computer work. Home Exercise Program:    [x] (39777) Reviewed/Progressed HEP activities related to strengthening, flexibility, endurance, ROM of scapular, scapulothoracic and UE control with self care, reaching, carrying, lifting, house/yardwork, driving/computer work  [] (45256) Reviewed/Progressed HEP activities related to improving balance, coordination, kinesthetic sense, posture, motor skill, proprioception of scapular, scapulothoracic and UE control with self care, reaching, carrying, lifting, house/yardwork, driving/computer work      Manual Treatments:  PROM / STM / Oscillations-Mobs:  G-I, II, III, IV (PA's, Inf., Post.)  [x] (31024) Provided manual therapy to mobilize soft tissue/joints of cervical/CT, scapular GHJ and UE for the purpose of modulating pain, promoting relaxation,  increasing ROM, reducing/eliminating soft tissue swelling/inflammation/restriction, improving soft tissue extensibility and allowing for proper ROM for normal function with self care, reaching, carrying, lifting, house/yardwork, driving/computer work    Modalities:     [] GAME READY (VASO)- for significant edema, swelling, pain control.      Charges:  Timed Code Treatment Minutes: 45   Total Treatment Minutes:  45   BWC:  TE TIME:  NMR TIME:  MANUAL TIME:  TA  UNTIMED MINUTES:  Medicare Total:   30  15              [] EVAL (LOW) 46573 (typically 20 minutes face-to-face)  [] EVAL (MOD) 33783 (typically 30 minutes face-to-face)  [] EVAL (HIGH) 48852 (typically 45 minutes face-to-face)  [] RE-EVAL     [x] XX(39897) 2     [] IONTO  [x] NMR (46496) 1     [] VASO  [] Manual (41792) 1     [] Other:  [] TA x      [] Mech Traction (89587)  [] ES(attended) (63283)      [] ES (un) (68556):           GOALS:     Patient stated goal: Improve motion, bowl     Therapist goals for Patient: Short Term Goals: To be achieved in: 2 weeks  1. Independent in HEP and progression per patient tolerance, in order to prevent re-injury. []? Progressing: [x]? Met: []? Not Met: []? Adjusted      2. Patient will have a decrease in pain to facilitate improvement in movement, function, and ADLs as indicated by Functional Deficits. []? Progressing: [x]? Met: []? Not Met: []? Adjusted      Long Term Goals: To be achieved in: 16 weeks  1. Disability index score of 40 % or less for the DASH to assist with reaching prior level of function. []? Progressing: [x]? Met: []? Not Met: []? Adjusted      2. Patient will demonstrate increased AROM flex/abd/ER/IR to 140/140/50/L1 to allow for proper joint functioning as indicated by patients Functional Deficits. []? Progressing: [x]? Met: []? Not Met: []? Adjusted      3. Patient will demonstrate an increase in Strength of shoulder to 5/5 to allow for proper functional mobility as indicated by patients Functional Deficits. []? Progressing: [x]? Met: []? Not Met: []? Adjusted      4. Patient will return to functional activities without increased symptoms or restriction. []? Progressing: [x]? Met: []? Not Met: []? Adjusted      5. Pt will return to bowling. (patient specific functional goal)    []? Progressing: [x]? Met: []? Not Met: []? Adjusted               ASSESSMENT:  Hilary James has been seen in PT for 14 visits s/p left TSA. Pt has responded well overall to PT session which have been addressing progressive ROM and light strengthening activities. Have progressed ROM and strength to as tolerated and she is doing very well. Functionally no significant limitations. She has returned to work. We did light bowling activities in clinic which she did well with using 6# ball. Will now d/c. Educated to follow up if needed with caregiver. Patient received education on their current pathology and how their condition effects them with their functional activities.  Patient understood discussion and questions were answered. Patient understands their activity limitations and understands rational for treatment progression. Pt educated on plan of care and HEP, if worsening symptoms to d/c that exercise. PLAN: See eval  [x] Continue per plan of care [] Alter current plan (see comments above)  [] Plan of care initiated [] Hold pending MD visit [] Discharge      Electronically signed by:  Enrique Rockwell, PT    Note: If patient does not return for scheduled/ recommended follow up visits, this note will serve as a discharge from care along with most recent update on progress.

## 2022-04-11 NOTE — PROGRESS NOTES
[address] [place] [address] and [facility], [address] [place] [address] [address]  Phone: [phone]   Fax: [phone]    Date: 2022          Patient Name; :  Fayrene ; 1975   Dx: Diagnosis: R25.253 (ICD-10-CM) - Left shoulder pain, unspecified chronicity; B99.278 (ICD-10-CM) - Status post left shoulder hemiarthroplasty      Physician: Referring Practitioner: Althea Gomez MD        Total PT Visits: 14     Measures Previous Current   Pain (0-10)  0   Disability % 84 30     AROM:  Flexion: 150  Abduction: 155  ER: 40  IR: L1    MMT:  Flexion: 5/5  Abduction: 5/5  ER: 5/5   IR: 5/5      Assessment:  Steve Stapleton has been seen in PT for 14 visits s/p left TSA. Pt has responded well overall to PT session which have been addressing progressive ROM and light strengthening activities. Have progressed ROM and strength to as tolerated and she is doing very well. Functionally no significant limitations. She has returned to work. We did light bowling activities in clinic which she did well with using 6# ball. Will now d/c. Educated to follow up if needed with caregiver. Prognosis for POC: [x] Good [] Fair  [] Poor      Patient requires continued skilled intervention: [x] Yes  [] No        Plan & Recommendations:  [] Continue rehabilitation due to objective improvement and continued functional deficits with frequency and duration:   [] Progress toward  []GAP, []Work Conditioning, []Independent HEP   [x] Discharge due to   [] All goals achieved, [] Maximized \"medical necessity\" [] No subjective or objective improvements      Electronically signed by:  Niraj Alvarez, PT  Therapy Plan of Care Re-Certification  This patient has been re-evaluated for physical therapy services and for therapy to continue, Medicare, Medicaid and other insurances require periodic physician review of the treatment plan.  Please review the above re-evaluation and verify that you agree with plan of care as established above by signing the attached document and return it to our office or note changes to established plan below  [] Follow treatment plan as above [] Discontinue physical therapy  [] Change plan to:                                 __________________________________________________    Physician Signature:____________________________________ Date:____________  By signing above, therapists plan is approved by physician    If you have any questions or concerns, please don't hesitate to call.   Thank you for your referral.

## 2022-04-22 ENCOUNTER — APPOINTMENT (OUTPATIENT)
Dept: PHYSICAL THERAPY | Age: 47
End: 2022-04-22
Payer: MEDICARE

## 2022-05-12 ENCOUNTER — OFFICE VISIT (OUTPATIENT)
Dept: ORTHOPEDIC SURGERY | Age: 47
End: 2022-05-12
Payer: MEDICARE

## 2022-05-12 VITALS — BODY MASS INDEX: 33.29 KG/M2 | HEIGHT: 56 IN | WEIGHT: 148 LBS

## 2022-05-12 DIAGNOSIS — Z96.612 STATUS POST LEFT SHOULDER HEMIARTHROPLASTY: Primary | ICD-10-CM

## 2022-05-12 PROCEDURE — 99213 OFFICE O/P EST LOW 20 MIN: CPT | Performed by: ORTHOPAEDIC SURGERY

## 2022-05-12 PROCEDURE — G8417 CALC BMI ABV UP PARAM F/U: HCPCS | Performed by: ORTHOPAEDIC SURGERY

## 2022-05-12 PROCEDURE — 1036F TOBACCO NON-USER: CPT | Performed by: ORTHOPAEDIC SURGERY

## 2022-05-12 PROCEDURE — G8427 DOCREV CUR MEDS BY ELIG CLIN: HCPCS | Performed by: ORTHOPAEDIC SURGERY

## 2022-05-12 NOTE — PROGRESS NOTES
Dr Zainab Roque      Date /Time 5/12/2022       11:46 AM EDT  Name Willow Steele             1975   Location  [facility]  MRN [id number]                Chief Complaint   Patient presents with    Shoulder Pain     LEFT SHOULDER TYRONE 1/19/22        History of Present Illness      Willow Steele is a 41 y.o. female is here for post-op visit after LEFT  [address]70 Hemiarthroplasty with proximal humeral implant      Patient is 4 months status post hemiarthroplasty left proximal humerus. Patient doing well. Pain controlled. No fever chills    Physical Exam    Based off 1997 Exam Criteria    Ht 4' 8\" (1.422 m)   Wt 148 lb (67.1 kg)   BMI 33.18 kg/m²      Constitutional:       General: He is not in acute distress. Appearance: Normal appearance. LEFT Shoulder: incision clean, intact, healed appropriately. No surrounding  erythema or fluctuance. Neuro intact distal. No evidence of DVT. External rotation 30 degrees and internal rotation L1  Forward flexion 140  Working on internal rotation. Imaging       Left Shoulder: [facility]  Radiographs: X-rays ordered today reviewed the left shoulder. 3 views. AP, scapular Y, and axillary views. They demonstrate a left shoulder hemiarthroplasty in good position. No evidence of loosening or periprosthetic fracture. Assessment and Plan    Marion Mott was seen today for shoulder pain. Diagnoses and all orders for this visit:    Status post left shoulder hemiarthroplasty  -     XR SHOULDER LEFT (MIN 2 VIEWS); Future        Patient is doing extremely well. I have cleared her to bowl. I advised her to take things easy at first and then progress to her usual.  I will see her at the 1 year follow-up. We discussed dental antibiotic prophylaxis. Electronically signed by Zainab Roque MD on 5/12/2022 at 4:38 PM  This dictation was generated by voice recognition computer software.   Although all attempts are made to edit the dictation for accuracy, there may be errors in the transcription that are not intended.

## 2022-06-04 ENCOUNTER — HOSPITAL ENCOUNTER (EMERGENCY)
Age: 47
Discharge: HOME | End: 2022-06-04
Attending: STUDENT IN AN ORGANIZED HEALTH CARE EDUCATION/TRAINING PROGRAM
Payer: MEDICARE

## 2022-06-04 ENCOUNTER — APPOINTMENT (OUTPATIENT)
Dept: CT IMAGING | Age: 47
End: 2022-06-04
Payer: MEDICARE

## 2022-06-04 VITALS
TEMPERATURE: 98 F | WEIGHT: 149 LBS | OXYGEN SATURATION: 100 % | HEIGHT: 63 IN | SYSTOLIC BLOOD PRESSURE: 118 MMHG | BODY MASS INDEX: 26.4 KG/M2 | HEART RATE: 70 BPM | RESPIRATION RATE: 16 BRPM | DIASTOLIC BLOOD PRESSURE: 68 MMHG

## 2022-06-04 DIAGNOSIS — N30.01 ACUTE CYSTITIS WITH HEMATURIA: ICD-10-CM

## 2022-06-04 DIAGNOSIS — R11.2 NON-INTRACTABLE VOMITING WITH NAUSEA, UNSPECIFIED VOMITING TYPE: Primary | ICD-10-CM

## 2022-06-04 LAB
A/G RATIO: 1.5 (ref 1.1–2.2)
ALBUMIN SERPL-MCNC: 4.4 G/DL (ref 3.4–5)
ALP BLD-CCNC: 94 U/L (ref 40–129)
ALT SERPL-CCNC: 16 U/L (ref 10–40)
ANION GAP SERPL CALCULATED.3IONS-SCNC: 12 MMOL/L (ref 3–16)
AST SERPL-CCNC: 20 U/L (ref 15–37)
BACTERIA: ABNORMAL /HPF
BASOPHILS ABSOLUTE: 0 K/UL (ref 0–0.2)
BASOPHILS RELATIVE PERCENT: 0.6 %
BILIRUB SERPL-MCNC: 0.4 MG/DL (ref 0–1)
BILIRUBIN URINE: NEGATIVE
BLOOD, URINE: ABNORMAL
BUN BLDV-MCNC: 17 MG/DL (ref 7–20)
CALCIUM SERPL-MCNC: 9.5 MG/DL (ref 8.3–10.6)
CHLORIDE BLD-SCNC: 107 MMOL/L (ref 99–110)
CLARITY: ABNORMAL
CO2: 21 MMOL/L (ref 21–32)
COLOR: YELLOW
CREAT SERPL-MCNC: 0.8 MG/DL (ref 0.6–1.1)
EKG ATRIAL RATE: 65 BPM
EKG DIAGNOSIS: NORMAL
EKG P AXIS: 34 DEGREES
EKG P-R INTERVAL: 150 MS
EKG Q-T INTERVAL: 386 MS
EKG QRS DURATION: 72 MS
EKG QTC CALCULATION (BAZETT): 401 MS
EKG R AXIS: 36 DEGREES
EKG T AXIS: 48 DEGREES
EKG VENTRICULAR RATE: 65 BPM
EOSINOPHILS ABSOLUTE: 0 K/UL (ref 0–0.6)
EOSINOPHILS RELATIVE PERCENT: 0.3 %
EPITHELIAL CELLS, UA: ABNORMAL /HPF (ref 0–5)
GFR AFRICAN AMERICAN: >60
GFR NON-AFRICAN AMERICAN: >60
GLUCOSE BLD-MCNC: 117 MG/DL (ref 70–99)
GLUCOSE URINE: NEGATIVE MG/DL
HCG QUALITATIVE: NEGATIVE
HCT VFR BLD CALC: 42.8 % (ref 36–48)
HEMOGLOBIN: 14.3 G/DL (ref 12–16)
KETONES, URINE: NEGATIVE MG/DL
LEUKOCYTE ESTERASE, URINE: ABNORMAL
LIPASE: 25 U/L (ref 13–60)
LYMPHOCYTES ABSOLUTE: 1 K/UL (ref 1–5.1)
LYMPHOCYTES RELATIVE PERCENT: 20.8 %
MCH RBC QN AUTO: 32.1 PG (ref 26–34)
MCHC RBC AUTO-ENTMCNC: 33.5 G/DL (ref 31–36)
MCV RBC AUTO: 95.8 FL (ref 80–100)
MICROSCOPIC EXAMINATION: YES
MONOCYTES ABSOLUTE: 0.4 K/UL (ref 0–1.3)
MONOCYTES RELATIVE PERCENT: 8.5 %
MUCUS: ABNORMAL /LPF
NEUTROPHILS ABSOLUTE: 3.3 K/UL (ref 1.7–7.7)
NEUTROPHILS RELATIVE PERCENT: 69.8 %
NITRITE, URINE: POSITIVE
PDW BLD-RTO: 14.7 % (ref 12.4–15.4)
PH UA: 6 (ref 5–8)
PLATELET # BLD: 207 K/UL (ref 135–450)
PMV BLD AUTO: 7.8 FL (ref 5–10.5)
POTASSIUM REFLEX MAGNESIUM: 4.4 MMOL/L (ref 3.5–5.1)
PROTEIN UA: ABNORMAL MG/DL
RBC # BLD: 4.47 M/UL (ref 4–5.2)
RBC UA: >100 /HPF (ref 0–4)
SODIUM BLD-SCNC: 140 MMOL/L (ref 136–145)
SPECIFIC GRAVITY UA: 1.02 (ref 1–1.03)
TOTAL PROTEIN: 7.3 G/DL (ref 6.4–8.2)
TROPONIN: <0.01 NG/ML
URINE TYPE: ABNORMAL
UROBILINOGEN, URINE: 0.2 E.U./DL
WBC # BLD: 4.8 K/UL (ref 4–11)
WBC UA: ABNORMAL /HPF (ref 0–5)

## 2022-06-04 PROCEDURE — 93010 ELECTROCARDIOGRAM REPORT: CPT | Performed by: INTERNAL MEDICINE

## 2022-06-04 PROCEDURE — 96375 TX/PRO/DX INJ NEW DRUG ADDON: CPT

## 2022-06-04 PROCEDURE — 96374 THER/PROPH/DIAG INJ IV PUSH: CPT

## 2022-06-04 PROCEDURE — 6360000002 HC RX W HCPCS: Performed by: STUDENT IN AN ORGANIZED HEALTH CARE EDUCATION/TRAINING PROGRAM

## 2022-06-04 PROCEDURE — 87086 URINE CULTURE/COLONY COUNT: CPT

## 2022-06-04 PROCEDURE — 85025 COMPLETE CBC W/AUTO DIFF WBC: CPT

## 2022-06-04 PROCEDURE — 84703 CHORIONIC GONADOTROPIN ASSAY: CPT

## 2022-06-04 PROCEDURE — 74177 CT ABD & PELVIS W/CONTRAST: CPT

## 2022-06-04 PROCEDURE — 84484 ASSAY OF TROPONIN QUANT: CPT

## 2022-06-04 PROCEDURE — 6360000004 HC RX CONTRAST MEDICATION: Performed by: STUDENT IN AN ORGANIZED HEALTH CARE EDUCATION/TRAINING PROGRAM

## 2022-06-04 PROCEDURE — 81001 URINALYSIS AUTO W/SCOPE: CPT

## 2022-06-04 PROCEDURE — 99285 EMERGENCY DEPT VISIT HI MDM: CPT

## 2022-06-04 PROCEDURE — 93005 ELECTROCARDIOGRAM TRACING: CPT | Performed by: STUDENT IN AN ORGANIZED HEALTH CARE EDUCATION/TRAINING PROGRAM

## 2022-06-04 PROCEDURE — 80053 COMPREHEN METABOLIC PANEL: CPT

## 2022-06-04 PROCEDURE — 87186 SC STD MICRODIL/AGAR DIL: CPT

## 2022-06-04 PROCEDURE — 2500000003 HC RX 250 WO HCPCS: Performed by: STUDENT IN AN ORGANIZED HEALTH CARE EDUCATION/TRAINING PROGRAM

## 2022-06-04 PROCEDURE — 83690 ASSAY OF LIPASE: CPT

## 2022-06-04 PROCEDURE — 87088 URINE BACTERIA CULTURE: CPT

## 2022-06-04 RX ORDER — PHENAZOPYRIDINE HYDROCHLORIDE 100 MG/1
100 TABLET, FILM COATED ORAL 3 TIMES DAILY PRN
Qty: 15 TABLET | Refills: 0 | Status: SHIPPED | OUTPATIENT
Start: 2022-06-04 | End: 2022-06-09

## 2022-06-04 RX ORDER — CEPHALEXIN 500 MG/1
500 CAPSULE ORAL 2 TIMES DAILY
Qty: 10 CAPSULE | Refills: 0 | Status: SHIPPED | OUTPATIENT
Start: 2022-06-04 | End: 2022-06-09

## 2022-06-04 RX ORDER — ONDANSETRON 2 MG/ML
4 INJECTION INTRAMUSCULAR; INTRAVENOUS ONCE
Status: COMPLETED | OUTPATIENT
Start: 2022-06-04 | End: 2022-06-04

## 2022-06-04 RX ORDER — FAMOTIDINE 10 MG/ML
20 INJECTION, SOLUTION INTRAVENOUS ONCE
Status: COMPLETED | OUTPATIENT
Start: 2022-06-04 | End: 2022-06-04

## 2022-06-04 RX ORDER — ONDANSETRON 4 MG/1
4 TABLET, ORALLY DISINTEGRATING ORAL EVERY 8 HOURS PRN
Qty: 9 TABLET | Refills: 0 | Status: SHIPPED | OUTPATIENT
Start: 2022-06-04 | End: 2022-06-07

## 2022-06-04 RX ADMIN — ONDANSETRON HYDROCHLORIDE 4 MG: 2 INJECTION, SOLUTION INTRAMUSCULAR; INTRAVENOUS at 09:39

## 2022-06-04 RX ADMIN — FAMOTIDINE 20 MG: 10 INJECTION, SOLUTION INTRAVENOUS at 09:39

## 2022-06-04 RX ADMIN — IOPAMIDOL 75 ML: 755 INJECTION, SOLUTION INTRAVENOUS at 10:29

## 2022-06-04 ASSESSMENT — PAIN SCALES - WONG BAKER: WONGBAKER_NUMERICALRESPONSE: 8

## 2022-06-04 ASSESSMENT — PAIN - FUNCTIONAL ASSESSMENT: PAIN_FUNCTIONAL_ASSESSMENT: WONG-BAKER FACES

## 2022-06-04 ASSESSMENT — PAIN DESCRIPTION - PAIN TYPE: TYPE: ACUTE PAIN

## 2022-06-04 ASSESSMENT — PAIN DESCRIPTION - ORIENTATION: ORIENTATION: LEFT

## 2022-06-04 ASSESSMENT — PAIN DESCRIPTION - LOCATION: LOCATION: ABDOMEN

## 2022-06-04 ASSESSMENT — PAIN DESCRIPTION - FREQUENCY: FREQUENCY: CONTINUOUS

## 2022-06-04 ASSESSMENT — PAIN DESCRIPTION - DESCRIPTORS: DESCRIPTORS: OTHER (COMMENT)

## 2022-06-04 NOTE — ED NOTES
Reviewed patient discharge instructions at this time, copy given to patient/parents. No questions or concerns. Patient/parents voiced understanding.         Patricia Washburn RN  06/04/22 2159

## 2022-06-04 NOTE — ED PROVIDER NOTES
[facility]      CHIEF COMPLAINT  Abdominal Pain (left sided abdominal pain with vomiting x2 that started in the middle of the night. pt had normal bowel movement this morning. pt states it hurts to urinate. denies fever, coughing, or any further symptoms )     HISTORY OF PRESENT ILLNESS  Arturo Gilbert is a 41 y.o. female  who presents to the ED complaining of left-sided abdominal pain and 2 episodes of nonbloody emesis. Pain reportedly started in the middle of the night. Patient does have a history of Down syndrome, history is provided by parents are at bedside. They state that she had a normal bowel movement this morning. Patient reportedly stating that it hurt to urinate. Denies cough, chest pain, shortness of breath, fever, or any other complaints or concerns. No previous abdominal surgeries. Unable to further describe the pain. Has not taken anything for symptoms. No other complaints, modifying factors or associated symptoms. I have reviewed the following from the nursing documentation. Past Medical History:   Diagnosis Date    COVID-19 12/28/2020    Down syndrome     Hx of blood clots 2020    with Covid     Influenza A 03/10/2020     Past Surgical History:   Procedure Laterality Date    FOOT SURGERY  5/26/11    lapidus procedure and soft tissue release right foot.     SHOULDER ARTHROPLASTY Left 1/19/2022    LEFT SHOULDER HEMIARTHROPLASTY/ BICEPS TENODESIS/ REPAIR OF RUPTURED MUSCULOTENDINOUS CUFF, CHRONIC performed by Rohini Edwards MD at [facility] History   Problem Relation Age of Onset    Arthritis Mother     Cancer Mother     Diabetes Mother     High Blood Pressure Mother     High Cholesterol Mother     Arthritis Father     Hearing Loss Father     Heart Disease Father     High Blood Pressure Father     High Cholesterol Father     Stroke Father     Arthritis Sister     Cancer Sister     Asthma Maternal Grandmother     Early Death Maternal Current Outpatient Medications   Medication Sig Dispense Refill    cephALEXin (KEFLEX) 500 MG capsule Take 1 capsule by mouth 2 times daily for 5 days 10 capsule 0    ondansetron (ZOFRAN ODT) 4 MG disintegrating tablet Take 1 tablet by mouth every 8 hours as needed for Nausea or Vomiting 9 tablet 0    phenazopyridine (PYRIDIUM) 100 MG tablet Take 1 tablet by mouth 3 times daily as needed for Pain 15 tablet 0     No Known Allergies    REVIEW OF SYSTEMS  10 systems reviewed, pertinent positives per HPI otherwise noted to be negative. PHYSICAL EXAM  /79   Pulse 75   Temp 98 °F (36.7 °C) (Oral)   Resp 16   Ht 5' 3\" (1.6 m)   Wt 149 lb (67.6 kg)   SpO2 100%   BMI 26.39 kg/m²    GENERAL APPEARANCE: Awake and alert. Cooperative. No acute distress. HENT: Normocephalic. Atraumatic. NECK: Supple. EYES: PERRL. EOM's grossly intact. HEART/CHEST: RRR. No murmurs. LUNGS: Respirations unlabored. CTAB. Good air exchange. Speaking comfortably in full sentences. ABDOMEN: Mild tenderness in LUQ with no guarding or rigidity. Soft. Non-distended. No masses. No organomegaly. MUSCULOSKELETAL: No extremity edema. Compartments soft. No deformity. No tenderness in the extremities. All extremities neurovascularly intact. SKIN: Warm and dry. No acute rashes. NEUROLOGICAL: Alert and oriented. CN's 2-12 intact. No gross facial drooping. Strength 5/5, sensation intact. Gait normal.  PSYCHIATRIC: Normal mood and affect. LABS  I have reviewed all labs for this visit.    Results for orders placed or performed during the hospital encounter of 06/04/22   Urinalysis with Microscopic   Result Value Ref Range    Color, UA Yellow Straw/Yellow    Clarity, UA CLOUDY (A) Clear    Glucose, Ur Negative Negative mg/dL    Bilirubin Urine Negative Negative    Ketones, Urine Negative Negative mg/dL    Specific Gravity, UA 1.025 1.005 - 1.030    Blood, Urine LARGE (A) Negative    pH, UA 6.0 5.0 - 8.0    Protein, UA TRACE (A) Negative mg/dL    Urobilinogen, Urine 0.2 <2.0 E.U./dL    Nitrite, Urine POSITIVE (A) Negative    Leukocyte Esterase, Urine SMALL (A) Negative    Microscopic Examination YES     Urine Type NotGiven     Mucus, UA Rare (A) None Seen /LPF    WBC, UA 10-20 (A) 0 - 5 /HPF    RBC, UA >100 (A) 0 - 4 /HPF    Epithelial Cells, UA 11-20 (A) 0 - 5 /HPF    Bacteria, UA 3+ (A) None Seen /HPF   CBC with Auto Differential   Result Value Ref Range    WBC 4.8 4.0 - 11.0 K/uL    RBC 4.47 4.00 - 5.20 M/uL    Hemoglobin 14.3 12.0 - 16.0 g/dL    Hematocrit 42.8 36.0 - 48.0 %    MCV 95.8 80.0 - 100.0 fL    MCH 32.1 26.0 - 34.0 pg    MCHC 33.5 31.0 - 36.0 g/dL    RDW 14.7 12.4 - 15.4 %    Platelets 581 258 - 349 K/uL    MPV 7.8 5.0 - 10.5 fL    Neutrophils % 69.8 %    Lymphocytes % 20.8 %    Monocytes % 8.5 %    Eosinophils % 0.3 %    Basophils % 0.6 %    Neutrophils Absolute 3.3 1.7 - 7.7 K/uL    Lymphocytes Absolute 1.0 1.0 - 5.1 K/uL    Monocytes Absolute 0.4 0.0 - 1.3 K/uL    Eosinophils Absolute 0.0 0.0 - 0.6 K/uL    Basophils Absolute 0.0 0.0 - 0.2 K/uL   Comprehensive Metabolic Panel w/ Reflex to MG   Result Value Ref Range    Sodium 140 136 - 145 mmol/L    Potassium reflex Magnesium 4.4 3.5 - 5.1 mmol/L    Chloride 107 99 - 110 mmol/L    CO2 21 21 - 32 mmol/L    Anion Gap 12 3 - 16    Glucose 117 (H) 70 - 99 mg/dL    BUN 17 7 - 20 mg/dL    CREATININE 0.8 0.6 - 1.1 mg/dL    GFR Non-African American >60 >60    GFR African American >60 >60    Calcium 9.5 8.3 - 10.6 mg/dL    Total Protein 7.3 6.4 - 8.2 g/dL    Albumin 4.4 3.4 - 5.0 g/dL    Albumin/Globulin Ratio 1.5 1.1 - 2.2    Total Bilirubin 0.4 0.0 - 1.0 mg/dL    Alkaline Phosphatase 94 40 - 129 U/L    ALT 16 10 - 40 U/L    AST 20 15 - 37 U/L   Lipase   Result Value Ref Range    Lipase 25.0 13.0 - 60.0 U/L   HCG Qualitative, Serum   Result Value Ref Range    hCG Qual Negative Detects HCG level >10 MIU/mL   Troponin   Result Value Ref Range    Troponin <0.01 <0.01 ng/mL   EKG 12 Lead   Result Value Ref Range    Ventricular Rate 65 BPM    Atrial Rate 65 BPM    P-R Interval 150 ms    QRS Duration 72 ms    Q-T Interval 386 ms    QTc Calculation (Bazett) 401 ms    P Axis 34 degrees    R Axis 36 degrees    T Axis 48 degrees    Diagnosis       Normal sinus rhythmLow voltage QRSBorderline ECGNo previous ECGs available       ECG  The Ekg interpreted by me shows  normal sinus rhythm with a rate of 65  Axis is   Normal  QTc is  normal  Intervals and Durations are unremarkable. ST Segments: nonspecific changes  No significant change from prior EKG dated 6/4/2022    RADIOLOGY  CT ABDOMEN PELVIS W IV CONTRAST Additional Contrast? None   Final Result   No acute intra-abdominal or pelvic abnormality           ED COURSE/MDM  Patient seen and evaluated. Old records reviewed. Labs and imaging reviewed and results discussed with patient. Patient is a 44-year-old female, with history of Down syndrome, brought in by parents with concerns for 2 episodes of nonbloody emesis and left upper quadrant abdominal pain. Full HPI as detailed above. Upon arrival in the ED, vitals reassuring. Patient is resting comfortably is in no acute distress. She has mild tenderness palpation left upper quadrant, no guarding or rigidity is nonperitoneal.  Also complaining of pain with urination. UA does show evidence of infection, will be sent for culture. Labs are otherwise reassuring. EKG without evidence of acute ischemia or dysrhythmias. Troponin negative, lipase and LFTs within normal limits. CT abdomen pelvis did not reveal acute intra-abdominal or pelvic abnormality. Feel that symptoms are likely secondary to urinary tract infection. Patient will be started on antibiotics and urine will be sent for culture. Will also be sent with prescriptions for Zofran and Pyridium. Given return precautions for the ED. Patient and family members are comfortable in agreement with plan of care and she was discharged. Advised follow-up with PCP. Is this patient to be included in the SEP-1 Core Measure? No   Exclusion criteria - the patient is NOT to be included for SEP-1 Core Measure due to:  2+ SIRS criteria are not met     During the patient's ED course, the patient was given:  Medications   famotidine (PEPCID) injection 20 mg (20 mg IntraVENous Given 6/4/22 0939)   ondansetron (ZOFRAN) injection 4 mg (4 mg IntraVENous Given 6/4/22 0939)   iopamidol (ISOVUE-370) 76 % injection 75 mL (75 mLs IntraVENous Given 6/4/22 1029)        CLINICAL IMPRESSION  1. Non-intractable vomiting with nausea, unspecified vomiting type    2. Acute cystitis with hematuria        Blood pressure 120/79, pulse 75, temperature 98 °F (36.7 °C), temperature source Oral, resp. rate 16, height 5' 3\" (1.6 m), weight 149 lb (67.6 kg), SpO2 100 %, not currently breastfeeding. DISPOSITION  Horace Sen was discharged to home in good condition. Patient was given scripts for the following medications. I counseled patient how to take these medications. New Prescriptions    CEPHALEXIN (KEFLEX) 500 MG CAPSULE    Take 1 capsule by mouth 2 times daily for 5 days    ONDANSETRON (ZOFRAN ODT) 4 MG DISINTEGRATING TABLET    Take 1 tablet by mouth every 8 hours as needed for Nausea or Vomiting    PHENAZOPYRIDINE (PYRIDIUM) 100 MG TABLET    Take 1 tablet by mouth 3 times daily as needed for Pain       Follow-up with:  Cora Matthews MD  [address] [place] [address]. Stew Magana  [phone]    Schedule an appointment as soon as possible for a visit       [place] [id number]. [facility] Emergency Department  [address] [place] [address],[address]  [phone]  Go to   If symptoms worsen      DISCLAIMER: This chart was created using Dragon dictation software. Efforts were made by me to ensure accuracy, however some errors may be present due to limitations of this technology and occasionally words are not transcribed correctly.        Sandrine Jay MD  06/04/22 0305

## 2022-06-07 LAB
ORGANISM: ABNORMAL
URINE CULTURE, ROUTINE: ABNORMAL
URINE CULTURE, ROUTINE: ABNORMAL

## 2023-06-27 NOTE — PATIENT INSTRUCTIONS
20 mg Xarelto sent to [facility]. Take an additional 2000 units of Vitamin D daily. Patient should call the office immediately with new or ongoing signs or symptoms or worsening, or proceed to the emergency room. If you are on medications which could impair your senses, you are at risk of weakness, falls, dizziness, or drowsiness. You should be careful during activities which could place you at risk of harm, such as climbing, using stairs, operating machinery, or driving vehicles. If you feel you cannot safely do these activities, you should request others to help you, or avoid the activities altogether. If you are drowsy for any other reason, you should use the same precautions as listed above.      Web Address for Advance Directive:    [email or web address] [place]

## 2024-02-26 ENCOUNTER — TELEPHONE (OUTPATIENT)
Dept: ORTHOPEDIC SURGERY | Age: 49
End: 2024-02-26

## 2024-02-26 NOTE — TELEPHONE ENCOUNTER
Medical Facility Question     Facility Name: [facility]ENT[facility]  Contact Name: JOHN  Contact Number: [phone]  Request or Information: INQUIRING IF THE PATIENT REQUIRES AN ANTIBIOTIC.

## 2024-02-27 ENCOUNTER — TELEPHONE (OUTPATIENT)
Dept: ORTHOPEDIC SURGERY | Age: 49
End: 2024-02-27

## 2024-02-27 NOTE — TELEPHONE ENCOUNTER
Faxing Patient Information     Facility Name: [facility]  Contact Name: ROME  Contact Number: [phone]  Facility Fax Number: [phone]    DENTAL OFFICE CALLING WANTING TO KNOW IF PATIENT NEEDS ANTIBIOTICS

## 2024-02-27 NOTE — TELEPHONE ENCOUNTER
Wants something faxed or emailed stating what her premeds are.   Fax  [phone]  Email [email or web address]

## 2024-09-26 ENCOUNTER — APPOINTMENT (OUTPATIENT)
Dept: GENERAL RADIOLOGY | Age: 49
End: 2024-09-26
Payer: MEDICARE

## 2024-09-26 ENCOUNTER — HOSPITAL ENCOUNTER (EMERGENCY)
Age: 49
Discharge: HOME | End: 2024-09-26
Payer: MEDICARE

## 2024-09-26 VITALS
TEMPERATURE: 98.8 F | HEIGHT: 63 IN | HEART RATE: 79 BPM | BODY MASS INDEX: 26.58 KG/M2 | DIASTOLIC BLOOD PRESSURE: 74 MMHG | OXYGEN SATURATION: 100 % | SYSTOLIC BLOOD PRESSURE: 148 MMHG | RESPIRATION RATE: 20 BRPM | WEIGHT: 150 LBS

## 2024-09-26 DIAGNOSIS — M25.562 ACUTE PAIN OF LEFT KNEE: ICD-10-CM

## 2024-09-26 LAB
ANION GAP SERPL CALCULATED.3IONS-SCNC: 9 MMOL/L (ref 3–16)
APTT BLD: 22.4 SEC (ref 22.1–36.4)
BASOPHILS # BLD: 0 K/UL (ref 0–0.2)
BASOPHILS NFR BLD: 0.9 %
BUN SERPL-MCNC: 19 MG/DL (ref 7–20)
CALCIUM SERPL-MCNC: 9.6 MG/DL (ref 8.3–10.6)
CHLORIDE SERPL-SCNC: 105 MMOL/L (ref 99–110)
CO2 SERPL-SCNC: 25 MMOL/L (ref 21–32)
CREAT SERPL-MCNC: 0.9 MG/DL (ref 0.6–1.1)
D-DIMER QUANTITATIVE: 0.47 UG/ML FEU (ref 0–0.6)
DEPRECATED RDW RBC AUTO: 13.9 % (ref 12.4–15.4)
EOSINOPHIL # BLD: 0.1 K/UL (ref 0–0.6)
EOSINOPHIL NFR BLD: 1.1 %
GFR SERPLBLD CREATININE-BSD FMLA CKD-EPI: 79 ML/MIN/{1.73_M2}
GLUCOSE SERPL-MCNC: 97 MG/DL (ref 70–99)
HCT VFR BLD AUTO: 42.4 % (ref 36–48)
HGB BLD-MCNC: 13.9 G/DL (ref 12–16)
INR PPP: 0.98 (ref 0.85–1.15)
LYMPHOCYTES # BLD: 2 K/UL (ref 1–5.1)
LYMPHOCYTES NFR BLD: 43.8 %
MCH RBC QN AUTO: 32.5 PG (ref 26–34)
MCHC RBC AUTO-ENTMCNC: 32.8 G/DL (ref 31–36)
MCV RBC AUTO: 99 FL (ref 80–100)
MONOCYTES # BLD: 0.5 K/UL (ref 0–1.3)
MONOCYTES NFR BLD: 11 %
NEUTROPHILS # BLD: 2 K/UL (ref 1.7–7.7)
NEUTROPHILS NFR BLD: 43.2 %
PLATELET # BLD AUTO: 207 K/UL (ref 135–450)
PMV BLD AUTO: 8.6 FL (ref 5–10.5)
POTASSIUM SERPL-SCNC: 4.4 MMOL/L (ref 3.5–5.1)
PROTHROMBIN TIME: 13.2 SEC (ref 11.9–14.9)
RBC # BLD AUTO: 4.28 M/UL (ref 4–5.2)
SODIUM SERPL-SCNC: 139 MMOL/L (ref 136–145)
WBC # BLD AUTO: 4.6 K/UL (ref 4–11)

## 2024-09-26 PROCEDURE — 85610 PROTHROMBIN TIME: CPT

## 2024-09-26 PROCEDURE — 73560 X-RAY EXAM OF KNEE 1 OR 2: CPT

## 2024-09-26 PROCEDURE — 85730 THROMBOPLASTIN TIME PARTIAL: CPT

## 2024-09-26 PROCEDURE — 80048 BASIC METABOLIC PNL TOTAL CA: CPT

## 2024-09-26 PROCEDURE — 85025 COMPLETE CBC W/AUTO DIFF WBC: CPT

## 2024-09-26 PROCEDURE — 85379 FIBRIN DEGRADATION QUANT: CPT

## 2024-09-26 PROCEDURE — 99284 EMERGENCY DEPT VISIT MOD MDM: CPT

## 2024-09-26 PROCEDURE — 6370000000 HC RX 637 (ALT 250 FOR IP): Performed by: PHYSICIAN ASSISTANT

## 2024-09-26 RX ORDER — IBUPROFEN 600 MG/1
600 TABLET, FILM COATED ORAL EVERY 6 HOURS PRN
Qty: 18 TABLET | Refills: 0 | Status: SHIPPED | OUTPATIENT
Start: 2024-09-26

## 2024-09-26 RX ORDER — IBUPROFEN 800 MG/1
800 TABLET, FILM COATED ORAL ONCE
Status: COMPLETED | OUTPATIENT
Start: 2024-09-26 | End: 2024-09-26

## 2024-09-26 RX ADMIN — IBUPROFEN 800 MG: 800 TABLET, FILM COATED ORAL at 17:09

## 2024-09-26 ASSESSMENT — PAIN DESCRIPTION - ORIENTATION: ORIENTATION: LEFT

## 2024-09-26 ASSESSMENT — ENCOUNTER SYMPTOMS
SINUS PRESSURE: 0
NAUSEA: 0
VOMITING: 0
DIARRHEA: 0
EYE REDNESS: 0
RHINORRHEA: 0
SHORTNESS OF BREATH: 0
SORE THROAT: 0
SINUS PAIN: 0
CHEST TIGHTNESS: 0
CONSTIPATION: 0
ABDOMINAL PAIN: 0
COUGH: 0
EYE DISCHARGE: 0

## 2024-09-26 ASSESSMENT — PAIN DESCRIPTION - LOCATION: LOCATION: KNEE

## 2024-09-26 ASSESSMENT — PAIN - FUNCTIONAL ASSESSMENT: PAIN_FUNCTIONAL_ASSESSMENT: 0-10

## 2024-09-26 ASSESSMENT — PAIN SCALES - GENERAL: PAINLEVEL_OUTOF10: 5

## 2024-09-26 ASSESSMENT — PAIN DESCRIPTION - PAIN TYPE: TYPE: ACUTE PAIN

## 2024-09-26 ASSESSMENT — PAIN DESCRIPTION - DESCRIPTORS: DESCRIPTORS: ACHING

## 2024-10-01 ENCOUNTER — OFFICE VISIT (OUTPATIENT)
Dept: ORTHOPEDIC SURGERY | Age: 49
End: 2024-10-01
Payer: MEDICARE

## 2024-10-01 VITALS — WEIGHT: 150 LBS | BODY MASS INDEX: 26.58 KG/M2 | HEIGHT: 63 IN

## 2024-10-01 DIAGNOSIS — M22.2X2 PATELLOFEMORAL PAIN SYNDROME OF LEFT KNEE: Primary | ICD-10-CM

## 2024-10-01 DIAGNOSIS — M25.562 ACUTE PAIN OF LEFT KNEE: ICD-10-CM

## 2024-10-01 PROCEDURE — G8428 CUR MEDS NOT DOCUMENT: HCPCS | Performed by: PHYSICIAN ASSISTANT

## 2024-10-01 PROCEDURE — 99214 OFFICE O/P EST MOD 30 MIN: CPT | Performed by: PHYSICIAN ASSISTANT

## 2024-10-01 PROCEDURE — 1036F TOBACCO NON-USER: CPT | Performed by: ORTHOPAEDIC SURGERY

## 2024-10-01 PROCEDURE — G8484 FLU IMMUNIZE NO ADMIN: HCPCS | Performed by: PHYSICIAN ASSISTANT

## 2024-10-01 PROCEDURE — G8419 CALC BMI OUT NRM PARAM NOF/U: HCPCS | Performed by: PHYSICIAN ASSISTANT

## 2024-10-01 RX ORDER — MELOXICAM 15 MG/1
15 TABLET ORAL DAILY PRN
Qty: 30 TABLET | Refills: 0 | Status: SHIPPED | OUTPATIENT
Start: 2024-10-01

## 2024-10-01 RX ORDER — MELOXICAM 7.5 MG/1
7.5 TABLET ORAL DAILY PRN
Qty: 30 TABLET | Refills: 5 | Status: CANCELLED | OUTPATIENT
Start: 2024-10-01

## 2024-10-01 NOTE — PROGRESS NOTES
the left knee.  4 views.  Standing AP, standing AP flexed, lateral, and skyline views.  They demonstrate no evidence of fractures or dislocations.  Well-maintained joint space.      Procedure:  Orders Placed This Encounter   Procedures    XR KNEE LEFT (MIN 4 VIEWS)     Standing Status:   Future     Number of Occurrences:   1     Standing Expiration Date:   10/1/2025       Assessment and Plan  Gilda was seen today for new patient.    Diagnoses and all orders for this visit:    Acute pain of left knee  -     XR KNEE LEFT (MIN 4 VIEWS); Future    Patellofemoral pain syndrome of left knee        Patient is suffering with patellofemoral syndrome.  She is placed on Mobic and physical therapy.  Follow-up in 6 weeks.  Not doing well consideration for either MRI versus cortisone injection.      I discussed with Gilda HUBER Neri that her history, symptoms, signs, and imaging are most consistent with  patellofemoral syndrome .    We reviewed the natural history of these conditions and treatment options ranging from conservative measures (rest, icing, activity modification, physical therapy, pain meds) to surgical options.     In terms of treatment, I recommended continuing with rest, icing, avoidance of painful activities, NSAIDs or pain meds as tolerated, and physical therapy.     We discussed surgical options as well, should conservative measures fail.

## 2024-12-16 ENCOUNTER — OFFICE VISIT (OUTPATIENT)
Dept: FAMILY MEDICINE CLINIC | Age: 49
End: 2024-12-16
Payer: MEDICARE

## 2024-12-16 VITALS
DIASTOLIC BLOOD PRESSURE: 70 MMHG | BODY MASS INDEX: 26.58 KG/M2 | RESPIRATION RATE: 17 BRPM | SYSTOLIC BLOOD PRESSURE: 120 MMHG | OXYGEN SATURATION: 99 % | HEART RATE: 88 BPM | TEMPERATURE: 98.8 F | WEIGHT: 150 LBS

## 2024-12-16 DIAGNOSIS — B96.89 ACUTE BACTERIAL SINUSITIS: ICD-10-CM

## 2024-12-16 DIAGNOSIS — H10.31 ACUTE BACTERIAL CONJUNCTIVITIS OF RIGHT EYE: Primary | ICD-10-CM

## 2024-12-16 DIAGNOSIS — J01.90 ACUTE BACTERIAL SINUSITIS: ICD-10-CM

## 2024-12-16 PROCEDURE — G8427 DOCREV CUR MEDS BY ELIG CLIN: HCPCS

## 2024-12-16 PROCEDURE — G8419 CALC BMI OUT NRM PARAM NOF/U: HCPCS

## 2024-12-16 PROCEDURE — 99213 OFFICE O/P EST LOW 20 MIN: CPT

## 2024-12-16 PROCEDURE — G8484 FLU IMMUNIZE NO ADMIN: HCPCS

## 2024-12-16 PROCEDURE — 1036F TOBACCO NON-USER: CPT

## 2024-12-16 RX ORDER — ERYTHROMYCIN 5 MG/G
OINTMENT OPHTHALMIC
Qty: 1 G | Refills: 0 | Status: SHIPPED | OUTPATIENT
Start: 2024-12-16 | End: 2024-12-17

## 2024-12-16 SDOH — ECONOMIC STABILITY: FOOD INSECURITY: WITHIN THE PAST 12 MONTHS, THE FOOD YOU BOUGHT JUST DIDN'T LAST AND YOU DIDN'T HAVE MONEY TO GET MORE.: NEVER TRUE

## 2024-12-16 SDOH — ECONOMIC STABILITY: FOOD INSECURITY: WITHIN THE PAST 12 MONTHS, YOU WORRIED THAT YOUR FOOD WOULD RUN OUT BEFORE YOU GOT MONEY TO BUY MORE.: NEVER TRUE

## 2024-12-16 SDOH — ECONOMIC STABILITY: INCOME INSECURITY: HOW HARD IS IT FOR YOU TO PAY FOR THE VERY BASICS LIKE FOOD, HOUSING, MEDICAL CARE, AND HEATING?: NOT HARD AT ALL

## 2024-12-16 ASSESSMENT — PATIENT HEALTH QUESTIONNAIRE - PHQ9
SUM OF ALL RESPONSES TO PHQ9 QUESTIONS 1 & 2: 0
2. FEELING DOWN, DEPRESSED OR HOPELESS: NOT AT ALL
SUM OF ALL RESPONSES TO PHQ QUESTIONS 1-9: 0
SUM OF ALL RESPONSES TO PHQ QUESTIONS 1-9: 0
1. LITTLE INTEREST OR PLEASURE IN DOING THINGS: NOT AT ALL
SUM OF ALL RESPONSES TO PHQ QUESTIONS 1-9: 0
SUM OF ALL RESPONSES TO PHQ QUESTIONS 1-9: 0

## 2024-12-16 NOTE — PROGRESS NOTES
[facility]  2024    Gilda He (:  1975) is a 49 y.o. female, here for evaluation of the following medical concerns:    Chief Complaint   Patient presents with    Cough     Productive cough, no fever, no body aches. X 1 week           HPI    Patient accompanied by her sister    Symptom onset one week    Symptoms include coughing, green mucus production. Red right eye with drainage started three days ago. Wakes up with crusty eye, cleans her eye afterwards. Sore throat. No sinus pain. No SOB no wheezing. No fevers and chills.     She seemed to be getting better but got worse over the weekend.    Alleviating factors: dayquil, nyquil    Sick contacts: multiple family members    Denies fevers, chills, reduced p.o. intake, nausea, vomiting, chest pain, shortness of breath.      HISTORIES  Current Outpatient Medications on File Prior to Visit   Medication Sig Dispense Refill    meloxicam (MOBIC) 15 MG tablet Take 1 tablet by mouth daily as needed for Pain 30 tablet 0    ibuprofen (ADVIL;MOTRIN) 600 MG tablet Take 1 tablet by mouth every 6 hours as needed for Pain 18 tablet 0     No current facility-administered medications on file prior to visit.      Past Medical History:   Diagnosis Date    COVID-19 2020    Down syndrome     Hx of blood clots     with Covid     Influenza A 03/10/2020     Patient Active Problem List   Diagnosis    Bunion of right foot    Closed displaced fracture of fourth metatarsal bone of right foot    Pneumonia due to COVID-19 virus    Pulmonary infiltrates    Hypoxemia    Hyponatremia    Down's syndrome    Vitamin D deficiency    Pulmonary embolism (HCC)    Hyperglycemia    Rotator cuff impingement syndrome of left shoulder    AVN (avascular necrosis of bone)       PE  Vitals:    24 1604   BP: 120/70   Pulse: 88   Resp: 17   Temp: 98.8 °F (37.1 °C)   TempSrc: Oral   SpO2: 99%   Weight: 68 kg (150 lb)     Estimated body mass index is 26.58 kg/m² as

## 2024-12-17 ENCOUNTER — TELEPHONE (OUTPATIENT)
Dept: FAMILY MEDICINE CLINIC | Age: 49
End: 2024-12-17

## 2024-12-17 RX ORDER — POLYMYXIN B SULFATE AND TRIMETHOPRIM 1; 10000 MG/ML; [USP'U]/ML
1 SOLUTION OPHTHALMIC EVERY 6 HOURS
Qty: 10 ML | Refills: 0 | Status: SHIPPED | OUTPATIENT
Start: 2024-12-17 | End: 2024-12-24

## 2024-12-17 NOTE — TELEPHONE ENCOUNTER
Pt's sisiter called and stated that she is having a lot of problems with the ointment. Was wanting to see if there was any drops that she could get instead of the ointment. And she uses [facility] pharmacy.

## 2025-01-08 ENCOUNTER — OFFICE VISIT (OUTPATIENT)
Dept: FAMILY MEDICINE CLINIC | Age: 50
End: 2025-01-08

## 2025-01-08 VITALS
RESPIRATION RATE: 17 BRPM | HEART RATE: 88 BPM | TEMPERATURE: 97.9 F | OXYGEN SATURATION: 94 % | SYSTOLIC BLOOD PRESSURE: 110 MMHG | DIASTOLIC BLOOD PRESSURE: 73 MMHG

## 2025-01-08 DIAGNOSIS — Z01.818 PREOP EXAMINATION: ICD-10-CM

## 2025-01-08 DIAGNOSIS — H61.23 BILATERAL IMPACTED CERUMEN: ICD-10-CM

## 2025-01-08 DIAGNOSIS — M21.612 BUNION, LEFT FOOT: Primary | ICD-10-CM

## 2025-01-08 DIAGNOSIS — Z30.42 DEPOT CONTRACEPTION: ICD-10-CM

## 2025-01-08 NOTE — PROGRESS NOTES
[facility]  Preoperative Visit   1/8/2025    Past medical history includes pulmonary embolism, bunion of right foot, avascular necrosis, Down syndrome, vitamin D deficiency.    Patient is here for preop evaluation at the request of Dr. Luong for left bunionectomy/lapiplasty on 1/13/2025.    Functional Assessment:  Exercise tolerance: >4 METS, can walk half a mile at least.   Able to carry groceries.   Able to walk a couple of  blocks, stairs.   Denies any current chest pain or discomfort, SOB, leg swelling.    Medications: reviewed, NSAID use: none    JOSEPHINE Screen:  Snore loudly? No   Feel tired/fatigued during the day? No   Stop breathing while sleeping? No   High blood pressure? No   Morning HA? No     History of surgery/ anesthesia:  - No hx of complications, anesthesia reaction  - She does have a history of PE after COVID a few years ago, and since then has had shoulder surgery  - No loose teeth/ dentures  - Agreeable pregnancy test today.  Patient has been taking Depo shot for many years.  - Support systems after surgery: guardian bringing her to surgery  - Smoking/ alcohol/drugs: None  - Complicating medical diseases are currently well controlled.     Problem List  Patient Active Problem List   Diagnosis    Bunion of right foot    Closed displaced fracture of fourth metatarsal bone of right foot    Pneumonia due to COVID-19 virus    Pulmonary infiltrates    Hypoxemia    Hyponatremia    Down's syndrome    Vitamin D deficiency    Pulmonary embolism (HCC)    Hyperglycemia    Rotator cuff impingement syndrome of left shoulder    AVN (avascular necrosis of bone)       Medical and Surgical History  Past Medical History:   Diagnosis Date    COVID-19 12/28/2020    Down syndrome     Hx of blood clots 2020    with Covid     Influenza A 03/10/2020     Past Surgical History:   Procedure Laterality Date    FOOT SURGERY  5/26/11    lapidus procedure and soft tissue release right foot.    SHOULDER ARTHROPLASTY

## 2025-01-08 NOTE — PATIENT INSTRUCTIONS
Suggest Debrox for wax buildup in ear.  Follow-up in office if not improving for ear irrigation to remove wax.

## 2025-01-09 LAB
ALBUMIN SERPL-MCNC: 4.3 G/DL (ref 3.4–5)
ALBUMIN/GLOB SERPL: 1.7 {RATIO} (ref 1.1–2.2)
ALP SERPL-CCNC: 117 U/L (ref 40–129)
ALT SERPL-CCNC: 20 U/L (ref 10–40)
ANION GAP SERPL CALCULATED.3IONS-SCNC: 11 MMOL/L (ref 3–16)
AST SERPL-CCNC: 23 U/L (ref 15–37)
BILIRUB SERPL-MCNC: <0.2 MG/DL (ref 0–1)
BUN SERPL-MCNC: 20 MG/DL (ref 7–20)
CALCIUM SERPL-MCNC: 9.7 MG/DL (ref 8.3–10.6)
CHLORIDE SERPL-SCNC: 105 MMOL/L (ref 99–110)
CO2 SERPL-SCNC: 25 MMOL/L (ref 21–32)
CREAT SERPL-MCNC: 0.9 MG/DL (ref 0.6–1.1)
GFR SERPLBLD CREATININE-BSD FMLA CKD-EPI: 79 ML/MIN/{1.73_M2}
GLUCOSE SERPL-MCNC: 87 MG/DL (ref 70–99)
HCG SERPL QL: NEGATIVE
POTASSIUM SERPL-SCNC: 4.4 MMOL/L (ref 3.5–5.1)
PROT SERPL-MCNC: 6.9 G/DL (ref 6.4–8.2)
SODIUM SERPL-SCNC: 141 MMOL/L (ref 136–145)

## 2025-01-29 ENCOUNTER — OFFICE VISIT (OUTPATIENT)
Dept: ENT CLINIC | Age: 50
End: 2025-01-29
Payer: MEDICARE

## 2025-01-29 VITALS
HEART RATE: 101 BPM | SYSTOLIC BLOOD PRESSURE: 106 MMHG | WEIGHT: 150 LBS | HEIGHT: 62 IN | DIASTOLIC BLOOD PRESSURE: 71 MMHG | BODY MASS INDEX: 27.6 KG/M2

## 2025-01-29 DIAGNOSIS — H61.23 BILATERAL IMPACTED CERUMEN: ICD-10-CM

## 2025-01-29 DIAGNOSIS — H60.391 OTHER INFECTIVE CHRONIC OTITIS EXTERNA OF RIGHT EAR: Primary | ICD-10-CM

## 2025-01-29 PROCEDURE — G8427 DOCREV CUR MEDS BY ELIG CLIN: HCPCS | Performed by: OTOLARYNGOLOGY

## 2025-01-29 PROCEDURE — G8419 CALC BMI OUT NRM PARAM NOF/U: HCPCS | Performed by: OTOLARYNGOLOGY

## 2025-01-29 PROCEDURE — 4130F TOPICAL PREP RX AOE: CPT | Performed by: OTOLARYNGOLOGY

## 2025-01-29 PROCEDURE — 99204 OFFICE O/P NEW MOD 45 MIN: CPT | Performed by: OTOLARYNGOLOGY

## 2025-01-29 PROCEDURE — 1036F TOBACCO NON-USER: CPT | Performed by: OTOLARYNGOLOGY

## 2025-01-29 PROCEDURE — 69210 REMOVE IMPACTED EAR WAX UNI: CPT | Performed by: OTOLARYNGOLOGY

## 2025-01-29 RX ORDER — ASPIRIN/CALCIUM/MAG/ALUMINUM 325 MG
TABLET ORAL
COMMUNITY

## 2025-01-29 RX ORDER — OXYCODONE AND ACETAMINOPHEN 5; 325 MG/1; MG/1
TABLET ORAL PRN
COMMUNITY
Start: 2025-01-17

## 2025-01-29 RX ORDER — CIPROFLOXACIN AND DEXAMETHASONE 3; 1 MG/ML; MG/ML
4 SUSPENSION/ DROPS AURICULAR (OTIC) 2 TIMES DAILY
Qty: 1 EACH | Refills: 0 | Status: SHIPPED | OUTPATIENT
Start: 2025-01-29 | End: 2025-02-08

## 2025-01-29 RX ORDER — CIPROFLOXACIN 500 MG/1
500 TABLET, FILM COATED ORAL 2 TIMES DAILY
Qty: 20 TABLET | Refills: 0 | Status: SHIPPED | OUTPATIENT
Start: 2025-01-29 | End: 2025-02-08

## 2025-01-29 NOTE — PROGRESS NOTES
[facility]  [address], [address]  [place] [address]  P: [phone]       Patient     Gilda He  1975    ChiefComplaint     Chief Complaint   Patient presents with    New Patient    Cerumen Impaction     Bilateral     Ear Problem     Muffled        History of Present Illness     Gilda is a 49-year-old female here today with her sister.  Aunt has Down syndrome and history of cerumen impactions that were previously managed by her dad.  He is since passed in her ears and up and cleaned.  Notes muffled hearing.  Has been complaining of right-sided ear pain since December.    Past Medical History     Past Medical History:   Diagnosis Date    COVID-19 12/28/2020    Down syndrome     Hx of blood clots 2020    with Covid     Influenza A 03/10/2020       Past Surgical History     Past Surgical History:   Procedure Laterality Date    FOOT SURGERY  5/26/11    lapidus procedure and soft tissue release right foot.    SHOULDER ARTHROPLASTY Left 1/19/2022    LEFT SHOULDER HEMIARTHROPLASTY/ BICEPS TENODESIS/ REPAIR OF RUPTURED MUSCULOTENDINOUS CUFF, CHRONIC performed by Andrew Grant MD at [facility] OR       Family History     Family History   Problem Relation Age of Onset    Arthritis Mother     Cancer Mother     Diabetes Mother     High Blood Pressure Mother     High Cholesterol Mother     Arthritis Father     Hearing Loss Father     Heart Disease Father     High Blood Pressure Father     High Cholesterol Father     Stroke Father     Arthritis Sister     Cancer Sister     Asthma Maternal Grandmother     Early Death Maternal Grandfather        Social History     Social History     Tobacco Use    Smoking status: Never    Smokeless tobacco: Never   Vaping Use    Vaping status: Never Used   Substance Use Topics    Alcohol use: No    Drug use: No        Allergies     No Known Allergies    Medications     Current Outpatient Medications   Medication Sig Dispense Refill    oxyCODONE-acetaminophen (PERCOCET)

## 2025-02-12 ENCOUNTER — OFFICE VISIT (OUTPATIENT)
Dept: ENT CLINIC | Age: 50
End: 2025-02-12
Payer: MEDICARE

## 2025-02-12 VITALS
SYSTOLIC BLOOD PRESSURE: 117 MMHG | DIASTOLIC BLOOD PRESSURE: 74 MMHG | HEIGHT: 62 IN | BODY MASS INDEX: 27.6 KG/M2 | WEIGHT: 150 LBS | HEART RATE: 112 BPM

## 2025-02-12 DIAGNOSIS — H60.391 OTHER INFECTIVE CHRONIC OTITIS EXTERNA OF RIGHT EAR: Primary | ICD-10-CM

## 2025-02-12 PROCEDURE — G8427 DOCREV CUR MEDS BY ELIG CLIN: HCPCS | Performed by: OTOLARYNGOLOGY

## 2025-02-12 PROCEDURE — 99212 OFFICE O/P EST SF 10 MIN: CPT | Performed by: OTOLARYNGOLOGY

## 2025-02-12 PROCEDURE — 4130F TOPICAL PREP RX AOE: CPT | Performed by: OTOLARYNGOLOGY

## 2025-02-12 PROCEDURE — 1036F TOBACCO NON-USER: CPT | Performed by: OTOLARYNGOLOGY

## 2025-02-12 PROCEDURE — G8419 CALC BMI OUT NRM PARAM NOF/U: HCPCS | Performed by: OTOLARYNGOLOGY

## 2025-02-12 ASSESSMENT — ENCOUNTER SYMPTOMS
FACIAL SWELLING: 0
APNEA: 0
SHORTNESS OF BREATH: 0
COUGH: 0
EYE ITCHING: 0
TROUBLE SWALLOWING: 0
SINUS PRESSURE: 0
VOICE CHANGE: 0
SORE THROAT: 0

## 2025-02-12 NOTE — PROGRESS NOTES
Constitutional:  Negative for appetite change, chills, fatigue, fever and unexpected weight change.   HENT:  Negative for congestion, ear discharge, ear pain, facial swelling, hearing loss, nosebleeds, postnasal drip, sinus pressure, sneezing, sore throat, tinnitus, trouble swallowing and voice change.    Eyes:  Negative for itching.   Respiratory:  Negative for apnea, cough and shortness of breath.    Endocrine: Negative for cold intolerance and heat intolerance.   Musculoskeletal:  Negative for myalgias and neck pain.   Skin:  Negative for rash.   Allergic/Immunologic: Negative for environmental allergies.   Neurological:  Negative for dizziness and headaches.   Psychiatric/Behavioral:  Negative for confusion, decreased concentration and sleep disturbance.          PhysicalExam     Vitals:    02/12/25 1322   BP: 117/74   Pulse: (!) 112   Weight: 68 kg (150 lb)   Height: 1.575 m (5' 2\")     Constitutional:       Appearance: Normal appearance.   HENT:      Head: Normocephalic.      Nose: Nose normal.      Ears: Bilateral EACs clear, TMs intact, middle ear is clear.  Right EAC exposed bone fully resolved healthy canal skin  Eyes:      Extraocular Movements: Extraocular movements intact.   Neck:      Musculoskeletal: Normal range of motion.   Neurological:      General: No focal deficit present.      Mental Status: She is alert and oriented to person, place, and time.   Psychiatric:         Mood and Affect: Mood normal.         Behavior: Behavior normal.         Thought Content: Thought content normal.       Assessment and Plan     1. Other infective chronic otitis externa of right ear  Resolved      Follow-up in 6 months for alfa Anne DO  2/12/25      Portions of this note were dictated using Dragon. There may be linguistic errors secondary to the use of this program.

## 2025-02-15 NOTE — TELEPHONE ENCOUNTER
Orthopedic Nurse Navigator Summary     COVID:  Vaccinated  -  Patient Name: Bhumika Jeff  Anticipated Date of Surgery: 01/19/22  Using [facility]? Yes, Are they Registered: Yes  Attended Pre-Op Education Class: Yes  If No, why not? PCP: Gloria Dennis  Phone #:  Date of PCP Visit for H&P: 01/10/2022  Any Noted Concerns from PCP prior to surgery: No  If Yes, what concerns?:  Is the Patient in a Pain Management Program?: No  Review of Past Medical History Reveals History of:  -  Critical Lab Values:  - Hemoglobin (g/dL): Date 01/07/2022 Value 14.8  - Hematocrit (%): Date 01/07/2022 Value 44  - HgbA1C : Date 01/07/2022 Value 5.4  - Albumin : Date 01/07/2022 Value 4.0  - BUN (mg/dL) : Date 01/07/2022 Value 17.0  - CREATININE (mg/dL) : Date 01/07/2022 Value 0.9  - BMI (kg/m2) : Date Value 30.0    UA showed E. Coli- Dr Malia Cervantes prescribed Ampicillin capsules 01/10/22    MRSA- negative 01/07/22  -  Coronary Artery Disease/HTN/CHF History: No  Cardiologist:  Cardiac Clearance Necessary:  Date of Cardiac Clearance Appt: On Plavix? If YES, when will they stop taking? Final Cardiac Recommendations: On any anticoagulation: No  -  Diabetes History: No  Most Recent HgbA1C: 5.4  PCP or Endocrine Recommendations:  Nutritionist/Dietician Consult Scheduled:  Final Plan For Diabetic Control:  Pulmonary: COPD/Emphysema/ Use of home oxygen:  Alcohol use: Non-Drinker  -  DVT Risk Stratification: High Risk  Vascular Consult Ordered:  Date of Vascular Appt:  Hematology/Oncology Consult Ordered:  Date of Hematology/Oncology Appt:  Final Recommendation For DVT Prophylaxis:  Smoking history: Non-Smoker  Use of Estrogen:  -  BMI Greater than 40 at time of scheduling?: No  Has Surgeon been notified of BMI concern? Weight Loss Clinic Consult Ordered  Date of Wt Loss Clinic Appt:  BMI at time of surgery (if went through [facility]):  -  Additional Medical Concerns: Patient had Covid Pneumonia 01/03/21, and had pulmonary embolism.  She will be placed on Eliquis 2.5mg BID for 30 days post-op. Additional Recommendations for above concerns: Patient does have Down syndrome. Mother wanted to stay overnight with patient in her room. Attended Pre-Hab Program: No  Anticipated Discharge Disposition: Home with OPT  Who will be with patient at home following discharge? mother and father  Equipment patient already has: sling  Bedroom on first or second floor: second  Bathroom on first or second floor: first, second  Weight bearing status: nwb left arm, otherwise fwb  Pre-op ambulatory status: ambulates without assistance  Number of entry steps:  One  Caregiver assistance: full time  -  Yi Rider RN  01/11/2022 No

## 2025-03-28 ENCOUNTER — OFFICE VISIT (OUTPATIENT)
Dept: FAMILY MEDICINE CLINIC | Age: 50
End: 2025-03-28

## 2025-03-28 VITALS
SYSTOLIC BLOOD PRESSURE: 113 MMHG | DIASTOLIC BLOOD PRESSURE: 74 MMHG | RESPIRATION RATE: 17 BRPM | OXYGEN SATURATION: 93 % | HEART RATE: 99 BPM

## 2025-03-28 DIAGNOSIS — Z00.00 ANNUAL PHYSICAL EXAM: Primary | ICD-10-CM

## 2025-03-28 DIAGNOSIS — Z12.31 ENCOUNTER FOR SCREENING MAMMOGRAM FOR BREAST CANCER: ICD-10-CM

## 2025-03-28 SDOH — ECONOMIC STABILITY: FOOD INSECURITY: WITHIN THE PAST 12 MONTHS, YOU WORRIED THAT YOUR FOOD WOULD RUN OUT BEFORE YOU GOT MONEY TO BUY MORE.: NEVER TRUE

## 2025-03-28 SDOH — ECONOMIC STABILITY: FOOD INSECURITY: WITHIN THE PAST 12 MONTHS, THE FOOD YOU BOUGHT JUST DIDN'T LAST AND YOU DIDN'T HAVE MONEY TO GET MORE.: NEVER TRUE

## 2025-03-28 ASSESSMENT — PATIENT HEALTH QUESTIONNAIRE - PHQ9
SUM OF ALL RESPONSES TO PHQ QUESTIONS 1-9: 0
1. LITTLE INTEREST OR PLEASURE IN DOING THINGS: NOT AT ALL
SUM OF ALL RESPONSES TO PHQ QUESTIONS 1-9: 0
2. FEELING DOWN, DEPRESSED OR HOPELESS: NOT AT ALL
SUM OF ALL RESPONSES TO PHQ QUESTIONS 1-9: 0
SUM OF ALL RESPONSES TO PHQ QUESTIONS 1-9: 0

## 2025-03-28 NOTE — PROGRESS NOTES
after-visit summary and questions were answered concerning future plans.     Medication Side Effects and Warnings   were discussed with patient    Patient Labs were reviewed and or requested    Patient Past Records were reviewed and or requested    Please note that this dictation was completed with [employer], the computer voice recognition software and [employer] intellience software.  Quite often unanticipated grammatical, syntax, homophones, and other interpretive errors are inadvertently transcribed by the computer software.  Please disregard these errors.  Please excuse any errors that have escaped final proofreading.  Thank you.     Patient should call the office immediately with new or ongoing signs or symptoms or worsening, or proceedto the emergency room.  No changes in past medical history, past surgical history, social history, or family history were noted during the patient encounter unless specifically listed above.  All updates of past medicalhistory, past surgical history, social history, or family history were reviewed personally by me during the office visit.  All problems listed in the assessment are stable unless noted otherwise.  Medication profilereviewed personally by me during the office visit.  Medication side effects and possible impairments from medications were discussed as applicable.     Call if pattern of symptoms change or persists for an extended time.     This document was prepared by a combination of typing and transcription through a voice recognition software.       Alexandria Cheek MD  3/28/25

## 2025-05-12 ENCOUNTER — HOSPITAL ENCOUNTER (OUTPATIENT)
Dept: MAMMOGRAPHY | Age: 50
Discharge: HOME | End: 2025-05-12
Payer: MEDICARE

## 2025-05-12 ENCOUNTER — RESULTS FOLLOW-UP (OUTPATIENT)
Dept: FAMILY MEDICINE CLINIC | Age: 50
End: 2025-05-12

## 2025-05-12 DIAGNOSIS — Z12.31 ENCOUNTER FOR SCREENING MAMMOGRAM FOR BREAST CANCER: ICD-10-CM

## 2025-05-12 PROCEDURE — 77067 SCR MAMMO BI INCL CAD: CPT

## 2025-05-19 ENCOUNTER — HOSPITAL ENCOUNTER (OUTPATIENT)
Dept: WOMENS IMAGING | Age: 50
Discharge: HOME | End: 2025-05-19
Payer: MEDICARE

## 2025-05-19 DIAGNOSIS — R92.8 ABNORMAL MAMMOGRAM: ICD-10-CM

## 2025-05-19 PROCEDURE — 76642 ULTRASOUND BREAST LIMITED: CPT

## 2025-05-19 PROCEDURE — G0279 TOMOSYNTHESIS, MAMMO: HCPCS

## 2025-05-26 ENCOUNTER — RESULTS FOLLOW-UP (OUTPATIENT)
Dept: FAMILY MEDICINE CLINIC | Age: 50
End: 2025-05-26

## 2025-06-03 ENCOUNTER — TELEMEDICINE (OUTPATIENT)
Dept: FAMILY MEDICINE CLINIC | Age: 50
End: 2025-06-03
Payer: MEDICARE

## 2025-06-03 DIAGNOSIS — Z00.00 INITIAL MEDICARE ANNUAL WELLNESS VISIT: Primary | ICD-10-CM

## 2025-06-03 PROCEDURE — G0438 PPPS, INITIAL VISIT: HCPCS

## 2025-06-03 ASSESSMENT — PATIENT HEALTH QUESTIONNAIRE - PHQ9
SUM OF ALL RESPONSES TO PHQ QUESTIONS 1-9: 0
SUM OF ALL RESPONSES TO PHQ QUESTIONS 1-9: 0
1. LITTLE INTEREST OR PLEASURE IN DOING THINGS: NOT AT ALL
2. FEELING DOWN, DEPRESSED OR HOPELESS: NOT AT ALL
SUM OF ALL RESPONSES TO PHQ QUESTIONS 1-9: 0
SUM OF ALL RESPONSES TO PHQ QUESTIONS 1-9: 0

## 2025-06-03 ASSESSMENT — LIFESTYLE VARIABLES
HOW OFTEN DO YOU HAVE A DRINK CONTAINING ALCOHOL: NEVER
HOW MANY STANDARD DRINKS CONTAINING ALCOHOL DO YOU HAVE ON A TYPICAL DAY: PATIENT DOES NOT DRINK

## 2025-06-03 NOTE — PROGRESS NOTES
Medicare Annual Wellness Visit    Gilda He is here for Medicare AWV    Assessment & Plan   Initial Medicare annual wellness visit     Return in about 1 year (around 6/3/2026).     Subjective   Patient's complete Health Risk Assessment and screening values have been reviewed and are found in Flowsheets. The following problems were reviewed today and where indicated follow up appointments were made and/or referrals ordered.    Positive Risk Factor Screenings with Interventions:                      Advanced Directives:  Do you have a Living Will?: (!) No    Intervention:  has NO advanced directive - information provided       Objective    Patient-Reported Vitals  Patient-Reported Weight: 150lbs        No Known Allergies  Prior to Visit Medications    Medication Sig Taking? Authorizing Provider   Aspirin Buf,[id number], (BUFFERED ASPIRIN) 325 MG TABS Take by mouth  Provider, MD Gisselle Sun (Including outside providers/suppliers regularly involved in providing care):   Patient Care Team:  Alexandria Cheek MD as PCP - General (Family Medicine)  Alexandria Cheek MD as PCP - Empaneled Provider  Thomas Luong MD as Consulting Physician (Orthopedic Surgery)     Recommendations for Preventive Services Due: see orders and patient instructions/AVS.  Recommended screening schedule for the next 5-10 years is provided to the patient in written form: see Patient Instructions/AVS.     Reviewed and updated this visit:  Tobacco  Allergies  Meds  Problems  Med Hx  Surg Hx  Fam Hx  Sexual   Hx            I, Ria Lopez LPN, 6/3/2025, performed the documented evaluation under the direct supervision of the attending physician.  Gilda He, was evaluated through a synchronous (real-time) audio-video encounter. The patient (or guardian if applicable) is aware that this is a billable service, which includes applicable co-pays. This Virtual Visit was conducted with patient's (and/or legal guardian's)

## 2025-06-16 ENCOUNTER — TELEPHONE (OUTPATIENT)
Dept: FAMILY MEDICINE CLINIC | Age: 50
End: 2025-06-16

## 2025-06-16 NOTE — TELEPHONE ENCOUNTER
----- Message from Dr. Alexandria Cheek MD sent at 6/15/2025  3:54 PM EDT -----  I reviewed records for patient's depot shots and pap smears. Please let patient/her guardian know the following:    Patient's last cervical cancer screening was 8/7/2023 was negative, therefore she does not need another cervical cancer screening test for another 5 years from that date. With patient's Depo-Provera injections administered every 12 weeks. However I am not seeing documentation from her provider where she gets her women's health care stating that the depo shot she gets is for menstrual issues, which in that case, I am not sure if she would be able to receive them at our clinic/through [employer].      Her cervical cancer screening can be done at our office however since she has been getting her care for the depo shot and pap smear at the women's clinic, then it may be more convenient to continue care there.

## 2025-06-16 NOTE — TELEPHONE ENCOUNTER
Pt's sister 'Julia' (Legal guardian) returned your call and said that she is going to have her medical records & letter sent to you stating that the pt does take Depo shots for her menstrual period issues.

## 2025-08-05 ENCOUNTER — CLINICAL SUPPORT (OUTPATIENT)
Dept: FAMILY MEDICINE CLINIC | Age: 50
End: 2025-08-05
Payer: MEDICARE

## 2025-08-05 DIAGNOSIS — Z30.9 ENCOUNTER FOR CONTRACEPTIVE MANAGEMENT, UNSPECIFIED TYPE: Primary | ICD-10-CM

## 2025-08-05 PROCEDURE — 96372 THER/PROPH/DIAG INJ SC/IM: CPT

## 2025-08-05 RX ORDER — MEDROXYPROGESTERONE ACETATE 150 MG/ML
150 INJECTION, SUSPENSION INTRAMUSCULAR ONCE
Status: COMPLETED | OUTPATIENT
Start: 2025-08-05 | End: 2025-08-05

## 2025-08-05 RX ADMIN — MEDROXYPROGESTERONE ACETATE 150 MG: 150 INJECTION, SUSPENSION INTRAMUSCULAR at 15:06

## 2025-08-13 ENCOUNTER — OFFICE VISIT (OUTPATIENT)
Dept: ENT CLINIC | Age: 50
End: 2025-08-13
Payer: MEDICARE

## 2025-08-13 VITALS
WEIGHT: 154 LBS | BODY MASS INDEX: 28.34 KG/M2 | HEART RATE: 80 BPM | HEIGHT: 62 IN | SYSTOLIC BLOOD PRESSURE: 118 MMHG | DIASTOLIC BLOOD PRESSURE: 79 MMHG

## 2025-08-13 DIAGNOSIS — H61.23 BILATERAL IMPACTED CERUMEN: Primary | ICD-10-CM

## 2025-08-13 PROCEDURE — 69210 REMOVE IMPACTED EAR WAX UNI: CPT | Performed by: OTOLARYNGOLOGY

## 2025-08-13 RX ORDER — MELOXICAM 15 MG/1
15 TABLET ORAL DAILY
COMMUNITY
Start: 2025-06-18

## (undated) DEVICE — PAD,NON-ADHERENT,3X8,STERILE,LF,1/PK: Brand: MEDLINE

## (undated) DEVICE — SOLUTION IV IRRIG WATER 1000ML POUR BRL 2F7114

## (undated) DEVICE — 3 BONE CEMENT MIXER: Brand: MIXEVAC

## (undated) DEVICE — TOWEL,STOP FLAG GOLD N-W: Brand: MEDLINE

## (undated) DEVICE — UNDERGLOVE SURG SZ 8 BLU LTX FREE SYN POLYISOPRENE POLYMER

## (undated) DEVICE — SOLUTION IV 1000ML 0.9% SOD CHL

## (undated) DEVICE — NEEDLE SPNL L3.5IN PNK HUB S STL REG WALL FIT STYL W/ QNCKE

## (undated) DEVICE — SUTURE VCRL SZ 2-0 L18IN ABSRB UD CT-1 L36MM 1/2 CIR J839D

## (undated) DEVICE — SPONGE,LAP,18"X18",DLX,XR,ST,5/PK,40/PK: Brand: MEDLINE

## (undated) DEVICE — STRAP SFTY W5XL72IN 1 PC

## (undated) DEVICE — DRAPE 70X60IN SPLIT IMPERV ADHES STRIP

## (undated) DEVICE — GLOVE SURG SZ 8 L12IN FNGR THK75MIL WHT LTX POLYMER BEAD

## (undated) DEVICE — SOLUTION IRRIG 3000ML 0.9% SOD CHL USP UROMATIC PLAS CONT

## (undated) DEVICE — INTENDED FOR TISSUE SEPARATION, AND OTHER PROCEDURES THAT REQUIRE A SHARP SURGICAL BLADE TO PUNCTURE OR CUT.: Brand: BARD-PARKER ® CARBON RIB-BACK BLADES

## (undated) DEVICE — TOTAL SHOULDER: Brand: MEDLINE INDUSTRIES, INC.

## (undated) DEVICE — Device

## (undated) DEVICE — SUTURE VCRL SZ 0 L18IN ABSRB UD L36MM CT-1 1/2 CIR J840D

## (undated) DEVICE — SUTURE ETHBND EXCEL SZ 2 L30IN NONABSORBABLE GRN L40MM V-37 MX69G

## (undated) DEVICE — PAD DRY FLOOR ABS 32X58IN GRN

## (undated) DEVICE — 3M™ COBAN™ NL STERILE NON-LATEX SELF-ADHERENT WRAP, 2086S, 6 IN X 5 YD (15 CM X 4,5 M), 12 ROLLS/CASE: Brand: 3M™ COBAN™

## (undated) DEVICE — SPONGE GZ W4XL8IN COT WVN 12 PLY

## (undated) DEVICE — DUAL CUT SAGITTAL BLADE

## (undated) DEVICE — SYRINGE CATH TIP 50ML

## (undated) DEVICE — SYSTEM SKIN CLSR 22CM DERMBND PRINEO

## (undated) DEVICE — PENCIL ES ULT VAC W TELSCP NOSE EZ CLN BLDE 10FT

## (undated) DEVICE — NEEDLE SUT SZ 4 MAYO CATGUT 1/2 CIR TAPR PNT DISP

## (undated) DEVICE — FLUID TRAP FOR MINIVAC ES EQUIP FLD TRAP

## (undated) DEVICE — GOWN,SIRUS,POLYRNF,BRTHSLV,XL,30/CS: Brand: MEDLINE

## (undated) DEVICE — SUTURE MCRYL SZ 4-0 L18IN ABSRB UD L19MM PS-2 3/8 CIR PRIM Y496G